# Patient Record
Sex: FEMALE | Race: WHITE | NOT HISPANIC OR LATINO | Employment: FULL TIME | URBAN - METROPOLITAN AREA
[De-identification: names, ages, dates, MRNs, and addresses within clinical notes are randomized per-mention and may not be internally consistent; named-entity substitution may affect disease eponyms.]

---

## 2022-03-13 ENCOUNTER — APPOINTMENT (OUTPATIENT)
Dept: ULTRASOUND IMAGING | Facility: HOSPITAL | Age: 57
End: 2022-03-13

## 2022-03-13 ENCOUNTER — HOSPITAL ENCOUNTER (EMERGENCY)
Facility: HOSPITAL | Age: 57
Discharge: HOME | End: 2022-03-13
Attending: EMERGENCY MEDICINE | Admitting: EMERGENCY MEDICINE

## 2022-03-13 VITALS
HEART RATE: 83 BPM | OXYGEN SATURATION: 100 % | RESPIRATION RATE: 20 BRPM | HEIGHT: 64 IN | DIASTOLIC BLOOD PRESSURE: 93 MMHG | BODY MASS INDEX: 33.61 KG/M2 | TEMPERATURE: 98 F | WEIGHT: 196.87 LBS | SYSTOLIC BLOOD PRESSURE: 149 MMHG

## 2022-03-13 DIAGNOSIS — R10.2 CHRONIC FEMALE PELVIC PAIN: Primary | ICD-10-CM

## 2022-03-13 DIAGNOSIS — G89.29 CHRONIC FEMALE PELVIC PAIN: Primary | ICD-10-CM

## 2022-03-13 DIAGNOSIS — D25.9 UTERINE LEIOMYOMA, UNSPECIFIED LOCATION: ICD-10-CM

## 2022-03-13 LAB
ALBUMIN SERPL-MCNC: 4.3 G/DL (ref 3.5–5.2)
ALBUMIN/GLOB SERPL: 1.3 G/DL
ALP SERPL-CCNC: 88 U/L (ref 39–117)
ALT SERPL W P-5'-P-CCNC: 15 U/L (ref 1–33)
ANION GAP SERPL CALCULATED.3IONS-SCNC: 9.5 MMOL/L (ref 5–15)
AST SERPL-CCNC: 18 U/L (ref 1–32)
BASOPHILS # BLD AUTO: 0.06 10*3/MM3 (ref 0–0.2)
BASOPHILS NFR BLD AUTO: 1.2 % (ref 0–1.5)
BILIRUB SERPL-MCNC: 0.5 MG/DL (ref 0–1.2)
BILIRUB UR QL STRIP: NEGATIVE
BUN SERPL-MCNC: 18 MG/DL (ref 6–20)
BUN/CREAT SERPL: 22 (ref 7–25)
C TRACH RRNA CVX QL NAA+PROBE: NOT DETECTED
CALCIUM SPEC-SCNC: 9.7 MG/DL (ref 8.6–10.5)
CANDIDA SPECIES: NEGATIVE
CHLORIDE SERPL-SCNC: 107 MMOL/L (ref 98–107)
CLARITY UR: ABNORMAL
CO2 SERPL-SCNC: 24.5 MMOL/L (ref 22–29)
COLOR UR: ABNORMAL
CREAT SERPL-MCNC: 0.82 MG/DL (ref 0.57–1)
DEPRECATED RDW RBC AUTO: 42.9 FL (ref 37–54)
EGFRCR SERPLBLD CKD-EPI 2021: 84.6 ML/MIN/1.73
EOSINOPHIL # BLD AUTO: 0.03 10*3/MM3 (ref 0–0.4)
EOSINOPHIL NFR BLD AUTO: 0.6 % (ref 0.3–6.2)
ERYTHROCYTE [DISTWIDTH] IN BLOOD BY AUTOMATED COUNT: 13 % (ref 12.3–15.4)
GARDNERELLA VAGINALIS: POSITIVE
GLOBULIN UR ELPH-MCNC: 3.2 GM/DL
GLUCOSE SERPL-MCNC: 90 MG/DL (ref 65–99)
GLUCOSE UR STRIP-MCNC: NEGATIVE MG/DL
HCT VFR BLD AUTO: 40.5 % (ref 34–46.6)
HGB BLD-MCNC: 13.8 G/DL (ref 12–15.9)
HGB UR QL STRIP.AUTO: NEGATIVE
IMM GRANULOCYTES # BLD AUTO: 0.01 10*3/MM3 (ref 0–0.05)
IMM GRANULOCYTES NFR BLD AUTO: 0.2 % (ref 0–0.5)
KETONES UR QL STRIP: NEGATIVE
LEUKOCYTE ESTERASE UR QL STRIP.AUTO: NEGATIVE
LIPASE SERPL-CCNC: 23 U/L (ref 13–60)
LYMPHOCYTES # BLD AUTO: 2.06 10*3/MM3 (ref 0.7–3.1)
LYMPHOCYTES NFR BLD AUTO: 40.2 % (ref 19.6–45.3)
MCH RBC QN AUTO: 30.8 PG (ref 26.6–33)
MCHC RBC AUTO-ENTMCNC: 34.1 G/DL (ref 31.5–35.7)
MCV RBC AUTO: 90.4 FL (ref 79–97)
MONOCYTES # BLD AUTO: 0.36 10*3/MM3 (ref 0.1–0.9)
MONOCYTES NFR BLD AUTO: 7 % (ref 5–12)
N GONORRHOEA RRNA SPEC QL NAA+PROBE: NOT DETECTED
NEUTROPHILS NFR BLD AUTO: 2.61 10*3/MM3 (ref 1.7–7)
NEUTROPHILS NFR BLD AUTO: 50.8 % (ref 42.7–76)
NITRITE UR QL STRIP: NEGATIVE
NRBC BLD AUTO-RTO: 0 /100 WBC (ref 0–0.2)
PH UR STRIP.AUTO: <=5 [PH] (ref 5–8)
PLATELET # BLD AUTO: 205 10*3/MM3 (ref 140–450)
PMV BLD AUTO: 10.9 FL (ref 6–12)
POTASSIUM SERPL-SCNC: 4.2 MMOL/L (ref 3.5–5.2)
PROT SERPL-MCNC: 7.5 G/DL (ref 6–8.5)
PROT UR QL STRIP: NEGATIVE
RBC # BLD AUTO: 4.48 10*6/MM3 (ref 3.77–5.28)
SODIUM SERPL-SCNC: 141 MMOL/L (ref 136–145)
SP GR UR STRIP: >1.03 (ref 1–1.03)
T VAGINALIS DNA VAG QL PROBE+SIG AMP: NEGATIVE
UROBILINOGEN UR QL STRIP: ABNORMAL
WBC NRBC COR # BLD: 5.13 10*3/MM3 (ref 3.4–10.8)

## 2022-03-13 PROCEDURE — 87660 TRICHOMONAS VAGIN DIR PROBE: CPT | Performed by: PHYSICIAN ASSISTANT

## 2022-03-13 PROCEDURE — 99283 EMERGENCY DEPT VISIT LOW MDM: CPT

## 2022-03-13 PROCEDURE — 80053 COMPREHEN METABOLIC PANEL: CPT | Performed by: PHYSICIAN ASSISTANT

## 2022-03-13 PROCEDURE — 76830 TRANSVAGINAL US NON-OB: CPT

## 2022-03-13 PROCEDURE — 87591 N.GONORRHOEAE DNA AMP PROB: CPT | Performed by: PHYSICIAN ASSISTANT

## 2022-03-13 PROCEDURE — 87510 GARDNER VAG DNA DIR PROBE: CPT | Performed by: PHYSICIAN ASSISTANT

## 2022-03-13 PROCEDURE — 87480 CANDIDA DNA DIR PROBE: CPT | Performed by: PHYSICIAN ASSISTANT

## 2022-03-13 PROCEDURE — 85025 COMPLETE CBC W/AUTO DIFF WBC: CPT | Performed by: PHYSICIAN ASSISTANT

## 2022-03-13 PROCEDURE — 81003 URINALYSIS AUTO W/O SCOPE: CPT | Performed by: PHYSICIAN ASSISTANT

## 2022-03-13 PROCEDURE — 87491 CHLMYD TRACH DNA AMP PROBE: CPT | Performed by: PHYSICIAN ASSISTANT

## 2022-03-13 PROCEDURE — 36415 COLL VENOUS BLD VENIPUNCTURE: CPT

## 2022-03-13 PROCEDURE — 83690 ASSAY OF LIPASE: CPT | Performed by: PHYSICIAN ASSISTANT

## 2022-03-13 RX ORDER — SODIUM CHLORIDE 0.9 % (FLUSH) 0.9 %
10 SYRINGE (ML) INJECTION AS NEEDED
Status: DISCONTINUED | OUTPATIENT
Start: 2022-03-13 | End: 2022-03-13 | Stop reason: HOSPADM

## 2022-03-13 NOTE — ED PROVIDER NOTES
"Subjective   Pt presents with right groin/pelvic pain over the past 2 years. States here over past month or so pain is worsened and hurts to stand for long periods of time or with movement of right leg.  Pt does say she has had 3 c-sections and a \"lump\" of endometriosis removed off of left ovary around 10 years ago.   Pt also complains of bruising more easily to legs/arm whenever accidentally hitting against something. Denies blood thinner but states she is probably taking Ibuprofen more often than she should.  Also concerned about bloating in abdomen even though taking HCTZ 12.5 for BP.  Denies dysuria, fever or chills, abnormal vaginal discharge, n/v/d.      History provided by:  Patient  Abdominal Pain  Pain location:  RLQ  Pain quality: aching    Pain radiates to:  Does not radiate  Pain severity:  Moderate  Onset quality:  Gradual  Duration: 2 years.  Timing:  Intermittent  Progression:  Worsening  Chronicity:  New  Relieved by:  Nothing  Worsened by:  Nothing  Associated symptoms: no chills, no fatigue and no fever        Review of Systems   Constitutional: Negative for appetite change, chills, fatigue and fever.   HENT: Negative.    Eyes: Negative.  Negative for photophobia.   Respiratory: Negative.    Gastrointestinal: Positive for abdominal pain.   Endocrine: Negative.    Genitourinary: Negative.    Musculoskeletal: Negative.    Skin: Negative.    Allergic/Immunologic: Negative.  Negative for immunocompromised state.   Neurological: Negative.    Hematological: Negative.    Psychiatric/Behavioral: Negative.    All other systems reviewed and are negative.      History reviewed. No pertinent past medical history.    No Known Allergies    History reviewed. No pertinent surgical history.    History reviewed. No pertinent family history.    Social History     Socioeconomic History   • Marital status: Single           Objective   Physical Exam  Vitals and nursing note reviewed. Exam conducted with a chaperone " present.   Constitutional:       General: She is not in acute distress.     Appearance: Normal appearance. She is not toxic-appearing.   HENT:      Head: Normocephalic and atraumatic.      Mouth/Throat:      Mouth: Mucous membranes are moist.   Eyes:      General: No scleral icterus.  Cardiovascular:      Rate and Rhythm: Normal rate and regular rhythm.      Pulses: Normal pulses.      Heart sounds: Normal heart sounds.   Pulmonary:      Effort: Pulmonary effort is normal. No respiratory distress.      Breath sounds: Normal breath sounds.   Abdominal:      General: Abdomen is flat.      Palpations: Abdomen is soft.      Tenderness: There is abdominal tenderness (mild right lower pelvic).   Genitourinary:     Vagina: Normal.      Uterus: Normal.       Adnexa:         Right: Tenderness present.       Comments: Small cervical polyp present  Musculoskeletal:         General: Normal range of motion.      Cervical back: Normal range of motion and neck supple.      Comments: Pain worse with movement of right leg and flexion of hip   Skin:     General: Skin is warm and dry.   Neurological:      Mental Status: She is alert and oriented to person, place, and time. Mental status is at baseline.             MDM  Number of Diagnoses or Management Options  Chronic female pelvic pain  Uterine leiomyoma, unspecified location  Diagnosis management comments: Pt is a 56 y.o. female that presents today with Patient presents with:  Groin Pain       Work up today:  Lab Results (last 24 hours)     Procedure Component Value Units Date/Time    Gardnerella vaginalis, Trichomonas vaginalis, Candida albicans, DNA -   Swab, Vagina ([id number])  (Abnormal) Collected: 03/13/22 1021    Specimen: Swab from Vagina Updated: 03/13/22 1136     GARDNERELLA VAGINALIS Positive     TRICHOMONAS VAGINALIS Negative     CANDIDA SPECIES Negative    Chlamydia trachomatis, Neisseria gonorrhoeae, PCR - Swab, Cervix   ([id number])  (Normal) Collected: 03/13/22 1021     Specimen: Swab from Cervix Updated: 03/13/22 1205     Chlamydia DNA by PCR Not Detected     Neisseria gonorrhoeae by PCR Not Detected    CBC & Differential ([id number])  (Normal) Collected: 03/13/22 1112    Specimen: Blood Updated: 03/13/22 1120    Narrative:      The following orders were created for panel order CBC & Differential.  Procedure                               Abnormality         Status                       ---------                               -----------         ------                       CBC Auto Differential([id number])        Normal              Final result                   Please view results for these tests on the individual orders.    Comprehensive Metabolic Panel ([id number]) Collected: 03/13/22 1112    Specimen: Blood Updated: 03/13/22 1150     Glucose 90 mg/dL      BUN 18 mg/dL      Creatinine 0.82 mg/dL      Sodium 141 mmol/L      Potassium 4.2 mmol/L      Chloride 107 mmol/L      CO2 24.5 mmol/L      Calcium 9.7 mg/dL      Total Protein 7.5 g/dL      Albumin 4.30 g/dL      ALT (SGPT) 15 U/L      AST (SGOT) 18 U/L      Alkaline Phosphatase 88 U/L      Total Bilirubin 0.5 mg/dL      Globulin 3.2 gm/dL      A/G Ratio 1.3 g/dL      BUN/Creatinine Ratio 22.0     Anion Gap 9.5 mmol/L      eGFR 84.6 mL/min/1.73      Comment: National Kidney Foundation and American Society of Nephrology   (ASN) Task Force recommended calculation based on the Chronic Kidney   Disease Epidemiology Collaboration (CKD-EPI) equation refit without   adjustment for race.       Narrative:      GFR Normal >60  Chronic Kidney Disease <60  Kidney Failure <15      Lipase ([id number])  (Normal) Collected: 03/13/22 1112    Specimen: Blood Updated: 03/13/22 1150     Lipase 23 U/L     CBC Auto Differential ([id number])  (Normal) Collected: 03/13/22 1112    Specimen: Blood Updated: 03/13/22 1120     WBC 5.13 10*3/mm3      RBC 4.48 10*6/mm3      Hemoglobin 13.8 g/dL      Hematocrit 40.5 %      MCV 90.4 fL      MCH 30.8 pg       MCHC 34.1 g/dL      RDW 13.0 %      RDW-SD 42.9 fl      MPV 10.9 fL      Platelets 205 10*3/mm3      Neutrophil % 50.8 %      Lymphocyte % 40.2 %      Monocyte % 7.0 %      Eosinophil % 0.6 %      Basophil % 1.2 %      Immature Grans % 0.2 %      Neutrophils, Absolute 2.61 10*3/mm3      Lymphocytes, Absolute 2.06 10*3/mm3      Monocytes, Absolute 0.36 10*3/mm3      Eosinophils, Absolute 0.03 10*3/mm3      Basophils, Absolute 0.06 10*3/mm3      Immature Grans, Absolute 0.01 10*3/mm3      nRBC 0.0 /100 WBC     Urinalysis With Culture If Indicated - Urine, Clean Catch ([id number])    (Abnormal) Collected: 03/13/22 1213    Specimen: Urine, Clean Catch Updated: 03/13/22 1228     Color, UA Dark Yellow     Appearance, UA Turbid     pH, UA <=5.0     Specific Gravity, UA >1.030     Glucose, UA Negative     Ketones, UA Negative     Bilirubin, UA Negative     Blood, UA Negative     Protein, UA Negative     Leuk Esterase, UA Negative     Nitrite, UA Negative     Urobilinogen, UA 1.0 E.U./dL    Narrative:      Urine microscopic not indicated.      US Non-ob Transvaginal    Result Date: 3/13/2022  PROCEDURE: US NON-OB TRANSVAGINAL  COMPARISON: None  INDICATIONS: right pelvic pain  TECHNIQUE: Ultrasound examination of the pelvis was performed, using endovaginal technique.   FINDINGS:  The uterus measures 6.4 cm x 3.4 cm x 4.3 cm.  Within the uterine body is a 1.1 cm x 1.1 cm hypoechoic focus, not completely specific in appearance but favored to represent a leiomyoma.  No internal blood flow is seen.  The endometrium measures 0.8 cm in thickness.  No ovarian cyst or mass is seen on either side.  Both ovaries demonstrate blood flow.  No free pelvic fluid is evident.         1. 1.1 cm x 1.1 cm hypoechoic focus in the uterine body, suspected to represent a leiomyoma 2. Endometrial stripe measures 0.8 cm in thickness which is borderline thickened for a postmenopausal patient.  Correlate clinically.     Giorgio Hall M.D.        Electronically Signed and Approved By: Giorgio Hall M.D. on 3/13/2022 at 11:56              @No orders to display     Work up unremarkable for emergent conditions today. Will set up with GYN for further eval.      The patient will pursue further outpatient evaluation with the primary care physician or other designated or consulting physician as outlined in the discharge instructions. They are agreeable to this plan of care and follow-up instructions have been explained in detail. The patient has received these instructions in written format and have expressed an understanding of the discharge instructions. The patient is aware that any significant change in condition or worsening of symptoms should prompt an immediate return to this or the closest emergency department or call to 911.  Pt is otherwise non toxic and non ill appearing and stable for d/c home.  Pt is in agreement with this.  All questions answered at bedside.          Amount and/or Complexity of Data Reviewed  Clinical lab tests: reviewed  Tests in the radiology section of CPT®: reviewed    Risk of Complications, Morbidity, and/or Mortality  Presenting problems: moderate  Diagnostic procedures: moderate  Management options: moderate    Patient Progress  Patient progress: stable      Final diagnoses:   Chronic female pelvic pain   Uterine leiomyoma, unspecified location       ED Disposition  ED Disposition     ED Disposition   Discharge    Condition   Stable    Comment   --             Matthew Queen MD  [facility] [place] [address]  [phone]               Medication List      No changes were made to your prescriptions during this visit.          Hollis Martin PA  03/13/22 2514

## 2022-04-08 ENCOUNTER — OFFICE VISIT (OUTPATIENT)
Dept: INTERNAL MEDICINE | Facility: CLINIC | Age: 57
End: 2022-04-08

## 2022-04-08 VITALS
HEART RATE: 69 BPM | DIASTOLIC BLOOD PRESSURE: 89 MMHG | BODY MASS INDEX: 33.8 KG/M2 | OXYGEN SATURATION: 98 % | TEMPERATURE: 97.7 F | WEIGHT: 198 LBS | HEIGHT: 64 IN | SYSTOLIC BLOOD PRESSURE: 144 MMHG

## 2022-04-08 DIAGNOSIS — D25.9 UTERINE LEIOMYOMA, UNSPECIFIED LOCATION: ICD-10-CM

## 2022-04-08 DIAGNOSIS — F17.211 CIGARETTE NICOTINE DEPENDENCE IN REMISSION: ICD-10-CM

## 2022-04-08 DIAGNOSIS — G25.81 RESTLESS LEG SYNDROME: ICD-10-CM

## 2022-04-08 DIAGNOSIS — Z79.899 ON LONG TERM DRUG THERAPY: ICD-10-CM

## 2022-04-08 DIAGNOSIS — Z00.00 ANNUAL PHYSICAL EXAM: ICD-10-CM

## 2022-04-08 DIAGNOSIS — E66.09 CLASS 1 OBESITY DUE TO EXCESS CALORIES WITHOUT SERIOUS COMORBIDITY WITH BODY MASS INDEX (BMI) OF 33.0 TO 33.9 IN ADULT: Primary | ICD-10-CM

## 2022-04-08 DIAGNOSIS — Z76.89 ESTABLISHING CARE WITH NEW DOCTOR, ENCOUNTER FOR: ICD-10-CM

## 2022-04-08 DIAGNOSIS — M54.41 CHRONIC RIGHT-SIDED LOW BACK PAIN WITH RIGHT-SIDED SCIATICA: ICD-10-CM

## 2022-04-08 DIAGNOSIS — Z12.11 ENCOUNTER FOR SCREENING COLONOSCOPY: ICD-10-CM

## 2022-04-08 DIAGNOSIS — B96.89 BACTERIAL VAGINOSIS: ICD-10-CM

## 2022-04-08 DIAGNOSIS — Z12.39 ENCOUNTER FOR SCREENING FOR MALIGNANT NEOPLASM OF BREAST, UNSPECIFIED SCREENING MODALITY: ICD-10-CM

## 2022-04-08 DIAGNOSIS — I10 ESSENTIAL HYPERTENSION: ICD-10-CM

## 2022-04-08 DIAGNOSIS — N76.0 BACTERIAL VAGINOSIS: ICD-10-CM

## 2022-04-08 DIAGNOSIS — Z11.59 NEED FOR HEPATITIS C SCREENING TEST: ICD-10-CM

## 2022-04-08 DIAGNOSIS — G89.29 CHRONIC RIGHT-SIDED LOW BACK PAIN WITH RIGHT-SIDED SCIATICA: ICD-10-CM

## 2022-04-08 DIAGNOSIS — G47.09 OTHER INSOMNIA: ICD-10-CM

## 2022-04-08 LAB
ALBUMIN SERPL-MCNC: 4.6 G/DL (ref 3.5–5.2)
ALBUMIN/GLOB SERPL: 1.6 G/DL
ALP SERPL-CCNC: 92 U/L (ref 39–117)
ALT SERPL W P-5'-P-CCNC: 17 U/L (ref 1–33)
AMPHET+METHAMPHET UR QL: NEGATIVE
AMPHETAMINE INTERNAL CONTROL: NORMAL
AMPHETAMINES UR QL: NEGATIVE
ANION GAP SERPL CALCULATED.3IONS-SCNC: 10.4 MMOL/L (ref 5–15)
AST SERPL-CCNC: 21 U/L (ref 1–32)
BARBITURATE INTERNAL CONTROL: NORMAL
BARBITURATES UR QL SCN: NEGATIVE
BASOPHILS # BLD AUTO: 0.07 10*3/MM3 (ref 0–0.2)
BASOPHILS NFR BLD AUTO: 1.2 % (ref 0–1.5)
BENZODIAZ UR QL SCN: NEGATIVE
BENZODIAZEPINE INTERNAL CONTROL: NORMAL
BILIRUB SERPL-MCNC: 0.3 MG/DL (ref 0–1.2)
BUN SERPL-MCNC: 13 MG/DL (ref 6–20)
BUN/CREAT SERPL: 16.3 (ref 7–25)
BUPRENORPHINE INTERNAL CONTROL: NORMAL
BUPRENORPHINE SERPL-MCNC: NEGATIVE NG/ML
CALCIUM SPEC-SCNC: 9.3 MG/DL (ref 8.6–10.5)
CANNABINOIDS SERPL QL: NEGATIVE
CHLORIDE SERPL-SCNC: 103 MMOL/L (ref 98–107)
CHOLEST SERPL-MCNC: 211 MG/DL (ref 0–200)
CO2 SERPL-SCNC: 24.6 MMOL/L (ref 22–29)
COCAINE INTERNAL CONTROL: NORMAL
COCAINE UR QL: NEGATIVE
CREAT SERPL-MCNC: 0.8 MG/DL (ref 0.57–1)
DEPRECATED RDW RBC AUTO: 42.5 FL (ref 37–54)
EGFRCR SERPLBLD CKD-EPI 2021: 87.1 ML/MIN/1.73
EOSINOPHIL # BLD AUTO: 0.05 10*3/MM3 (ref 0–0.4)
EOSINOPHIL NFR BLD AUTO: 0.8 % (ref 0.3–6.2)
ERYTHROCYTE [DISTWIDTH] IN BLOOD BY AUTOMATED COUNT: 12.9 % (ref 12.3–15.4)
EXPIRATION DATE: NORMAL
GLOBULIN UR ELPH-MCNC: 2.8 GM/DL
GLUCOSE SERPL-MCNC: 78 MG/DL (ref 65–99)
HCT VFR BLD AUTO: 39.9 % (ref 34–46.6)
HCV AB SER DONR QL: NORMAL
HDLC SERPL-MCNC: 54 MG/DL (ref 40–60)
HGB BLD-MCNC: 13.5 G/DL (ref 12–15.9)
IMM GRANULOCYTES # BLD AUTO: 0.01 10*3/MM3 (ref 0–0.05)
IMM GRANULOCYTES NFR BLD AUTO: 0.2 % (ref 0–0.5)
LDLC SERPL CALC-MCNC: 137 MG/DL (ref 0–100)
LDLC/HDLC SERPL: 2.5 {RATIO}
LYMPHOCYTES # BLD AUTO: 2.53 10*3/MM3 (ref 0.7–3.1)
LYMPHOCYTES NFR BLD AUTO: 42.1 % (ref 19.6–45.3)
Lab: NORMAL
MCH RBC QN AUTO: 30.6 PG (ref 26.6–33)
MCHC RBC AUTO-ENTMCNC: 33.8 G/DL (ref 31.5–35.7)
MCV RBC AUTO: 90.5 FL (ref 79–97)
MDMA (ECSTASY) INTERNAL CONTROL: NORMAL
MDMA UR QL SCN: NEGATIVE
METHADONE INTERNAL CONTROL: NORMAL
METHADONE UR QL SCN: NEGATIVE
METHAMPHETAMINE INTERNAL CONTROL: NORMAL
MONOCYTES # BLD AUTO: 0.38 10*3/MM3 (ref 0.1–0.9)
MONOCYTES NFR BLD AUTO: 6.3 % (ref 5–12)
NEUTROPHILS NFR BLD AUTO: 2.97 10*3/MM3 (ref 1.7–7)
NEUTROPHILS NFR BLD AUTO: 49.4 % (ref 42.7–76)
NRBC BLD AUTO-RTO: 0 /100 WBC (ref 0–0.2)
OPIATES INTERNAL CONTROL: NORMAL
OPIATES UR QL: NEGATIVE
OXYCODONE INTERNAL CONTROL: NORMAL
OXYCODONE UR QL SCN: NEGATIVE
PCP UR QL SCN: NEGATIVE
PHENCYCLIDINE INTERNAL CONTROL: NORMAL
PLATELET # BLD AUTO: 216 10*3/MM3 (ref 140–450)
PMV BLD AUTO: 11.9 FL (ref 6–12)
POTASSIUM SERPL-SCNC: 3.9 MMOL/L (ref 3.5–5.2)
PROT SERPL-MCNC: 7.4 G/DL (ref 6–8.5)
RBC # BLD AUTO: 4.41 10*6/MM3 (ref 3.77–5.28)
SODIUM SERPL-SCNC: 138 MMOL/L (ref 136–145)
THC INTERNAL CONTROL: NORMAL
TRIGL SERPL-MCNC: 111 MG/DL (ref 0–150)
TSH SERPL DL<=0.05 MIU/L-ACNC: 1.31 UIU/ML (ref 0.27–4.2)
VLDLC SERPL-MCNC: 20 MG/DL (ref 5–40)
WBC NRBC COR # BLD: 6.01 10*3/MM3 (ref 3.4–10.8)

## 2022-04-08 PROCEDURE — 87491 CHLMYD TRACH DNA AMP PROBE: CPT | Performed by: STUDENT IN AN ORGANIZED HEALTH CARE EDUCATION/TRAINING PROGRAM

## 2022-04-08 PROCEDURE — 3008F BODY MASS INDEX DOCD: CPT | Performed by: STUDENT IN AN ORGANIZED HEALTH CARE EDUCATION/TRAINING PROGRAM

## 2022-04-08 PROCEDURE — 2014F MENTAL STATUS ASSESS: CPT | Performed by: STUDENT IN AN ORGANIZED HEALTH CARE EDUCATION/TRAINING PROGRAM

## 2022-04-08 PROCEDURE — 84443 ASSAY THYROID STIM HORMONE: CPT | Performed by: STUDENT IN AN ORGANIZED HEALTH CARE EDUCATION/TRAINING PROGRAM

## 2022-04-08 PROCEDURE — 80053 COMPREHEN METABOLIC PANEL: CPT | Performed by: STUDENT IN AN ORGANIZED HEALTH CARE EDUCATION/TRAINING PROGRAM

## 2022-04-08 PROCEDURE — 87661 TRICHOMONAS VAGINALIS AMPLIF: CPT | Performed by: STUDENT IN AN ORGANIZED HEALTH CARE EDUCATION/TRAINING PROGRAM

## 2022-04-08 PROCEDURE — 80305 DRUG TEST PRSMV DIR OPT OBS: CPT | Performed by: STUDENT IN AN ORGANIZED HEALTH CARE EDUCATION/TRAINING PROGRAM

## 2022-04-08 PROCEDURE — 85025 COMPLETE CBC W/AUTO DIFF WBC: CPT | Performed by: STUDENT IN AN ORGANIZED HEALTH CARE EDUCATION/TRAINING PROGRAM

## 2022-04-08 PROCEDURE — 80061 LIPID PANEL: CPT | Performed by: STUDENT IN AN ORGANIZED HEALTH CARE EDUCATION/TRAINING PROGRAM

## 2022-04-08 PROCEDURE — 99386 PREV VISIT NEW AGE 40-64: CPT | Performed by: STUDENT IN AN ORGANIZED HEALTH CARE EDUCATION/TRAINING PROGRAM

## 2022-04-08 PROCEDURE — 86803 HEPATITIS C AB TEST: CPT | Performed by: STUDENT IN AN ORGANIZED HEALTH CARE EDUCATION/TRAINING PROGRAM

## 2022-04-08 PROCEDURE — 87591 N.GONORRHOEAE DNA AMP PROB: CPT | Performed by: STUDENT IN AN ORGANIZED HEALTH CARE EDUCATION/TRAINING PROGRAM

## 2022-04-08 RX ORDER — BENAZEPRIL HYDROCHLORIDE AND HYDROCHLOROTHIAZIDE 20; 12.5 MG/1; MG/1
1 TABLET ORAL DAILY
COMMUNITY
End: 2022-04-08 | Stop reason: SDUPTHER

## 2022-04-08 RX ORDER — ZOLPIDEM TARTRATE 6.25 MG/1
6.25 TABLET, FILM COATED, EXTENDED RELEASE ORAL NIGHTLY PRN
Qty: 30 TABLET | Refills: 2 | Status: SHIPPED | OUTPATIENT
Start: 2022-04-08 | End: 2022-12-28

## 2022-04-08 RX ORDER — TRAZODONE HYDROCHLORIDE 50 MG/1
50 TABLET ORAL NIGHTLY
COMMUNITY
End: 2022-04-08

## 2022-04-08 RX ORDER — BENAZEPRIL HYDROCHLORIDE AND HYDROCHLOROTHIAZIDE 20; 12.5 MG/1; MG/1
1 TABLET ORAL DAILY
Qty: 90 TABLET | Refills: 3 | Status: SHIPPED | OUTPATIENT
Start: 2022-04-08 | End: 2022-12-28

## 2022-04-08 RX ORDER — GABAPENTIN 300 MG/1
300 CAPSULE ORAL 2 TIMES DAILY
COMMUNITY
End: 2022-04-12 | Stop reason: SDUPTHER

## 2022-04-08 RX ORDER — TEMAZEPAM 15 MG/1
15 CAPSULE ORAL NIGHTLY
COMMUNITY
End: 2022-04-08

## 2022-04-08 NOTE — PROGRESS NOTES
Chief Complaint  Establish Care, Insomnia    Subjective          Hope Serena Cuevas presents to [facility]  History of Present Illness    Previous PCP: Patient's previous PCP was Dr. Ajay Macdonald in [place]. [address] [facility] [address] [place] [address]. [phone].  Specialist(s): Patient does not consult any specialist.   COVID vaccine: Patient has not had this vaccination and does not want it.  Pneumonia vaccine: Can't give to patient due to insurance. Patient has not had this vaccination.  Shingles vaccine: Can't give to patient due to insurance. Patient has not had this vaccination.  Colon cancer screening: Patient has never had a colonoscopy.  Mammogram: 2014  Pap Smear: Patient hasn't had a pap in a long time and is overdue. Unsure of date of last pap.   DEXA/Bone Density: Patient has never had this done.    Here to establish care.  Medical history notable for LBP (on gabapentin), insomnia (on trazodone and temazepam), HTN.    Seen in ED 3/13/22 for chronic pelvic pain.  Reports a history of endometriosis.  Had ultrasound showing evidence of a leiomyoma.    Does not currently have an ob/gyn doctor.  She is unsure of her last pap smear.    Regarding insomnia, reports trouble with sleep intiation and sleep maintenance.  She does keep the tv on when she sleeps.    Regarding BP, she reports she does not own a BP cuff    She quit smoking 2 years ago    Regarding BP pain, has pain radiating into right leg.  She has never had PT.  She is on gabapentin for treatment.  She has never had any procedures on her spine.    She has no previous history of MI, CVA or cancer diagnosis.  She has never had covid infection.      Current Outpatient Medications   Medication Instructions   • benazepril-hydrochlorthiazide (LOTENSIN HCT) 20-12.5 MG per tablet 1 tablet, Oral, Daily   • gabapentin (NEURONTIN) 300 mg, Oral, 2 Times Daily, 2 capsules QAM and 2 capsules QPM  "  • zolpidem CR (AMBIEN CR) 6.25 mg, Oral, Nightly PRN       The following portions of the patient's history were reviewed and updated as appropriate: allergies, current medications, past family history, past medical history, past social history, past surgical history, and problem list.    Objective   Vital Signs:   /89 (BP Location: Right arm, Patient Position: Sitting, Cuff Size: Large Adult)   Pulse 69   Temp 97.7 °F (36.5 °C) (Temporal)   Ht 162.6 cm (64\")   Wt 89.8 kg (198 lb)   SpO2 98%   BMI 33.99 kg/m²     Wt Readings from Last 3 Encounters:   04/08/22 89.8 kg (198 lb)   03/13/22 89.3 kg (196 lb 13.9 oz)     BP Readings from Last 3 Encounters:   04/08/22 144/89   03/13/22 149/93     Physical Exam   Appearance: No acute distress, well-nourished  Head: normocephalic, atraumatic  Eyes: no scleral icterus, no conjunctival injection  Ears, Nose, and Throat: external ears normal, nares patent, moist mucous membranes  Cardiovascular: regular rate and rhythm. no murmurs, rubs, or gallops. no edema  Respiratory: breathing comfortably, symmetric chest rise, clear to auscultation bilaterally. No wheezes, rales, or rhonchi.  GI: soft, NTTP, no masses or HSM  Neuro: alert and oriented  Psych: normal mood and affect     Result Review :   The following data was reviewed by: Thierno Li MD on 04/08/2022:  Common labs    Common Labsle 3/13/22 3/13/22    1112 1112   Glucose  90   BUN  18   Creatinine  0.82   Sodium  141   Potassium  4.2   Chloride  107   Calcium  9.7   Albumin  4.30   Total Bilirubin  0.5   Alkaline Phosphatase  88   AST (SGOT)  18   ALT (SGPT)  15   WBC 5.13    Hemoglobin 13.8    Hematocrit 40.5    Platelets 205                   Lab Results   Component Value Date    BILIRUBINUR Negative 03/13/2022       Procedures        Assessment and Plan    Diagnoses and all orders for this visit:    1. Class 1 obesity due to excess calories without serious comorbidity with body mass index (BMI) of 33.0 " to 33.9 in adult (Primary)    2. Establishing care with new doctor, encounter for    3. Uterine leiomyoma, unspecified location    4. Bacterial vaginosis  -     Chlamydia trachomatis, Neisseria gonorrhoeae, Trichomonas vaginalis, PCR - Urine, Urine, Clean Catch    5. Need for hepatitis C screening test  -     Hepatitis C Antibody    6. Annual physical exam  -     CBC & Differential  -     Comprehensive Metabolic Panel  -     Hepatitis C Antibody  -     Lipid Panel  -     TSH    7. Encounter for screening colonoscopy  -     Ambulatory Referral For Screening Colonoscopy    8. Encounter for screening for malignant neoplasm of breast, unspecified screening modality  -     Mammo Screening Bilateral With CAD; Future    9. Essential hypertension  -     Comprehensive Metabolic Panel  -     benazepril-hydrochlorthiazide (LOTENSIN HCT) 20-12.5 MG per tablet; Take 1 tablet by mouth Daily.  Dispense: 90 tablet; Refill: 3    10. Chronic right-sided low back pain with right-sided sciatica  -     Ambulatory Referral to Physical Therapy Evaluate and treat    11. Restless leg syndrome    12. Cigarette nicotine dependence in remission    13. On long term drug therapy  -     POC Urine Drug Screen [facility]    14. Other insomnia  -     zolpidem CR (Ambien CR) 6.25 MG CR tablet; Take 1 tablet by mouth At Night As Needed for Sleep.  Dispense: 30 tablet; Refill: 2      HTN:  -will monitor for now, and if still above goal next visit will adjust her regimen  -low sodium dietary counseling today    Insomnia:  -will stop trazodone and temazepam, and substitute ambien  -sleep hygiene counseling today    LBP:  -PT referral  -will continue gabapentin    Obesity:  -exercise counseling, recommending at least 2.5 hours moderate exercise weekly  -nutritional counseling, on the elements of a heart healthy diet as well as low sodium dietary counseling  -referred for mammogram and colonoscopy    Health Maintenance:  -exercise counseling,  recommending at least 2.5 hours moderate exercise weekly  -nutritional counseling, on the elements of a heart healthy diet as well as low sodium dietary counseling  -mammogram ordered, and colonoscopy referral placed    Misc:  -asked  to request previous PCP's records    Medications Discontinued During This Encounter   Medication Reason   • benazepril-hydrochlorthiazide (LOTENSIN HCT) 20-12.5 MG per tablet Reorder   • traZODone (DESYREL) 50 MG tablet    • temazepam (RESTORIL) 15 MG capsule           Follow Up   Return in about 3 months (around 7/8/2022) for Back Pain, Insomnia.  Patient was given instructions and counseling regarding her condition or for health maintenance advice. Please see specific information pulled into the AVS if appropriate.       Thierno Li MD  04/08/22  18:35 EDT

## 2022-04-08 NOTE — PATIENT INSTRUCTIONS
Cooking With Less Salt  Cooking with less salt is one way to reduce the amount of sodium you get from food. Sodium is one of the elements that make up salt. It is found naturally in foods and is also added to certain foods. Depending on your condition and overall health, your health care provider or dietitian may recommend that you reduce your sodium intake. Most people should have less than 2,300 milligrams (mg) of sodium each day. If you have high blood pressure (hypertension), you may need to limit your sodium to 1,500 mg each day. Follow the tips below to help reduce your sodium intake.  What are tips for eating less sodium?  Reading food labels       Check the food label before buying or using packaged ingredients. Always check the label for the serving size and sodium content.  Look for products with no more than 140 mg of sodium in one serving.  Check the % Daily Value column to see what percent of the daily recommended amount of sodium is provided in one serving of the product. Foods with 5% or less in this column are considered low in sodium. Foods with 20% or higher are considered high in sodium.  Do not choose foods with salt as one of the first three ingredients on the ingredients list. If salt is one of the first three ingredients, it usually means the item is high in sodium.     Shopping  Buy sodium-free or low-sodium products. Look for the following words on food labels:  Low-sodium.  Sodium-free.  Reduced-sodium.  No salt added.  Unsalted.  Always check the sodium content even if foods are labeled as low-sodium or no salt added.  Buy fresh foods.  Cooking  Use herbs, seasonings without salt, and spices as substitutes for salt.  Use sodium-free baking soda when baking.  [place], braise, or roast foods to add flavor with less salt.  Avoid adding salt to pasta, rice, or hot cereals.  Drain and rinse canned vegetables, beans, and meat before use.  Avoid adding salt when cooking sweets and desserts.  Cook  "with low-sodium ingredients.  What foods are high in sodium?  Vegetables  Regular canned vegetables (not low-sodium or reduced-sodium). Sauerkraut, pickled vegetables, and relishes. Olives. French fries. Onion rings. Regular canned tomato sauce and paste. Regular tomato and vegetable juice. Frozen vegetables in sauces.  Grains  Instant hot cereals. Bread stuffing, pancake, and biscuit mixes. Croutons. Seasoned rice or pasta mixes. Noodle soup cups. Boxed or frozen macaroni and cheese. Regular salted crackers. Self-rising flour. Rolls. Bagels. Flour tortillas and wraps.  Meats and other proteins  Meat or fish that is salted, canned, smoked, cured, spiced, or pickled. This includes allen, ham, sausages, hot dogs, corned beef, chipped beef, meat loaves, salt pork, jerky, pickled herring, anchovies, regular canned tuna, and sardines. Salted nuts.  Dairy  Processed cheese and cheese spreads. Cheese curds. Blue cheese. Feta cheese. String cheese. Regular cottage cheese. Buttermilk. Canned milk.  The items listed above may not be a complete list of foods high in sodium. Actual amounts of sodium may be different depending on processing. Contact a dietitian for more information.  What foods are low in sodium?  Fruits  Fresh, frozen, or canned fruit with no sauce added. Fruit juice.  Vegetables  Fresh or frozen vegetables with no sauce added. \"No salt added\" canned vegetables. \"No salt added\" tomato sauce and paste. Low-sodium or reduced-sodium tomato and vegetable juice.  Grains  Noodles, pasta, quinoa, rice. Shredded or puffed wheat or puffed rice. Regular or quick oats (not instant). Low-sodium crackers. Low-sodium bread. Whole-grain bread and whole-grain pasta. Unsalted popcorn.  Meats and other proteins  Fresh or frozen whole meats, poultry (not injected with sodium), and fish with no sauce added. Unsalted nuts. Dried peas, beans, and lentils without added salt. Unsalted canned beans. Eggs. Unsalted nut butters. " Low-sodium canned tuna or chicken.  Dairy  Milk. Soy milk. Yogurt. Low-sodium cheeses, such as Swiss, [place], mozzarella, and ricotta. Sherbet or ice cream (keep to ½ cup per serving). Cream cheese.  Fats and oils  Unsalted butter or margarine.  Other foods  Homemade pudding. Sodium-free baking soda and baking powder. Herbs and spices. Low-sodium seasoning mixes.  Beverages  Coffee and tea. Carbonated beverages.  The items listed above may not be a complete list of foods low in sodium. Actual amounts of sodium may be different depending on processing. Contact a dietitian for more information.  What are some salt alternatives when cooking?  The following are herbs, seasonings, and spices that can be used instead of salt to flavor your food. Herbs should be fresh or dried. Do not choose packaged mixes. Next to the name of the herb, spice, or seasoning are some examples of foods you can pair it with.  Herbs  Bay leaves - Soups, meat and vegetable dishes, and spaghetti sauce.  Basil - Italian dishes, soups, pasta, and fish dishes.  Cilantro - Meat, poultry, and vegetable dishes.  Chili powder - Marinades and Mexican dishes.  Chives - Salad dressings and potato dishes.  Cumin - Mexican dishes, couscous, and meat dishes.  Dill - Fish dishes, sauces, and salads.  Fennel - Meat and vegetable dishes, breads, and cookies.  Garlic (do not use garlic salt) - Italian dishes, meat dishes, salad dressings, and sauces.  Marjoram - Soups, potato dishes, and meat dishes.  Oregano - Pizza and spaghetti sauce.  Parsley - Salads, soups, pasta, and meat dishes.  Danica - Italian dishes, salad dressings, soups, and red meats.  Saffron - Fish dishes, pasta, and some poultry dishes.  Hector - Stuffings and sauces.  Tarragon - Fish and poultry dishes.  Thyme - Stuffing, meat, and fish dishes.  Seasonings  Lemon juice - Fish dishes, poultry dishes, vegetables, and salads.  Vinegar - Salad dressings, vegetables, and fish  "dishes.  Spices  Cinnamon - Sweet dishes, such as cakes, cookies, and puddings.  Cloves - Gingerbread, puddings, and marinades for meats.  Burton - Vegetable dishes, fish and poultry dishes, and stir-ibarra dishes.  Jaquelin - Vegetable dishes, fish dishes, and stir-ibarra dishes.  Nutmeg - Pasta, vegetables, poultry, fish dishes, and custard.  Summary  Cooking with less salt is one way to reduce the amount of sodium that you get from food.  Buy sodium-free or low-sodium products.  Check the food label before using or buying packaged ingredients.  Use herbs, seasonings without salt, and spices as substitutes for salt in foods.  This information is not intended to replace advice given to you by your health care provider. Make sure you discuss any questions you have with your health care provider.  Document Revised: 12/09/2020 Document Reviewed: 12/09/2020  [employer] Patient Education © 2021 [employer]      https://www.nhlbi.nih.gov/files/docs/public/heart/dash_brief.pdf\">   DASH Eating Plan  DASH stands for Dietary Approaches to Stop Hypertension. The DASH eating plan is a healthy eating plan that has been shown to:  Reduce high blood pressure (hypertension).  Reduce your risk for type 2 diabetes, heart disease, and stroke.  Help with weight loss.  What are tips for following this plan?  Reading food labels  Check food labels for the amount of salt (sodium) per serving. Choose foods with less than 5 percent of the Daily Value of sodium. Generally, foods with less than 300 milligrams (mg) of sodium per serving fit into this eating plan.  To find whole grains, look for the word \"whole\" as the first word in the ingredient list.  Shopping  Buy products labeled as \"low-sodium\" or \"no salt added.\"  Buy fresh foods. Avoid canned foods and pre-made or frozen meals.  Cooking  Avoid adding salt when cooking. Use salt-free seasonings or herbs instead of table salt or sea salt. Check with your health care provider or pharmacist before " using salt substitutes.  Do not ibarra foods. Cook foods using healthy methods such as baking, boiling, grilling, roasting, and broiling instead.  Cook with heart-healthy oils, such as olive, canola, avocado, soybean, or sunflower oil.  Meal planning       Eat a balanced diet that includes:  4 or more servings of fruits and 4 or more servings of vegetables each day. Try to fill one-half of your plate with fruits and vegetables.  6-8 servings of whole grains each day.  Less than 6 oz (170 g) of lean meat, poultry, or fish each day. A 3-oz (85-g) serving of meat is about the same size as a deck of cards. One egg equals 1 oz (28 g).  2-3 servings of low-fat dairy each day. One serving is 1 cup (237 mL).  1 serving of nuts, seeds, or beans 5 times each week.  2-3 servings of heart-healthy fats. Healthy fats called omega-3 fatty acids are found in foods such as walnuts, flaxseeds, fortified milks, and eggs. These fats are also found in cold-water fish, such as sardines, salmon, and mackerel.  Limit how much you eat of:  Canned or prepackaged foods.  Food that is high in trans fat, such as some fried foods.  Food that is high in saturated fat, such as fatty meat.  Desserts and other sweets, sugary drinks, and other foods with added sugar.  Full-fat dairy products.  Do not salt foods before eating.  Do not eat more than 4 egg yolks a week.  Try to eat at least 2 vegetarian meals a week.  Eat more home-cooked food and less restaurant, buffet, and fast food.     Lifestyle  When eating at a restaurant, ask that your food be prepared with less salt or no salt, if possible.  If you drink alcohol:  Limit how much you use to:  0-1 drink a day for women who are not pregnant.  0-2 drinks a day for men.  Be aware of how much alcohol is in your drink. In the [place] one drink equals one 12 oz bottle of beer (355 mL), one 5 oz glass of wine (148 mL), or one 1½ oz glass of hard liquor (44 mL).  General information  Avoid eating more than  2,300 mg of salt a day. If you have hypertension, you may need to reduce your sodium intake to 1,500 mg a day.  Work with your health care provider to maintain a healthy body weight or to lose weight. Ask what an ideal weight is for you.  Get at least 30 minutes of exercise that causes your heart to beat faster (aerobic exercise) most days of the week. Activities may include walking, swimming, or biking.  Work with your health care provider or dietitian to adjust your eating plan to your individual calorie needs.  What foods should I eat?  Fruits  All fresh, dried, or frozen fruit. Canned fruit in natural juice (without added sugar).  Vegetables  Fresh or frozen vegetables (raw, steamed, roasted, or grilled). Low-sodium or reduced-sodium tomato and vegetable juice. Low-sodium or reduced-sodium tomato sauce and tomato paste. Low-sodium or reduced-sodium canned vegetables.  Grains  Whole-grain or whole-wheat bread. Whole-grain or whole-wheat pasta. Brown rice. Oatmeal. Quinoa. Bulgur. Whole-grain and low-sodium cereals. Grace bread. Low-fat, low-sodium crackers. Whole-wheat flour tortillas.  Meats and other proteins  Skinless chicken or turkey. Ground chicken or turkey. Pork with fat trimmed off. Fish and seafood. Egg whites. Dried beans, peas, or lentils. Unsalted nuts, nut butters, and seeds. Unsalted canned beans. Lean cuts of beef with fat trimmed off. Low-sodium, lean precooked or cured meat, such as sausages or meat loaves.  Dairy  Low-fat (1%) or fat-free (skim) milk. Reduced-fat, low-fat, or fat-free cheeses. Nonfat, low-sodium ricotta or cottage cheese. Low-fat or nonfat yogurt. Low-fat, low-sodium cheese.  Fats and oils  Soft margarine without trans fats. Vegetable oil. Reduced-fat, low-fat, or light mayonnaise and salad dressings (reduced-sodium). Canola, safflower, olive, avocado, soybean, and sunflower oils. Avocado.  Seasonings and condiments  Herbs. Spices. Seasoning mixes without salt.  Other  foods  Unsalted popcorn and pretzels. Fat-free sweets.  The items listed above may not be a complete list of foods and beverages you can eat. Contact a dietitian for more information.  What foods should I avoid?  Fruits  Canned fruit in a light or heavy syrup. Fried fruit. Fruit in cream or butter sauce.  Vegetables  Creamed or fried vegetables. Vegetables in a cheese sauce. Regular canned vegetables (not low-sodium or reduced-sodium). Regular canned tomato sauce and paste (not low-sodium or reduced-sodium). Regular tomato and vegetable juice (not low-sodium or reduced-sodium). Pickles. Olives.  Grains  Baked goods made with fat, such as croissants, muffins, or some breads. Dry pasta or rice meal packs.  Meats and other proteins  Fatty cuts of meat. Ribs. Fried meat. Rivas. Bologna, salami, and other precooked or cured meats, such as sausages or meat loaves. Fat from the back of a pig (fatback). Bratwurst. Salted nuts and seeds. Canned beans with added salt. Canned or smoked fish. Whole eggs or egg yolks. Chicken or turkey with skin.  Dairy  Whole or 2% milk, cream, and half-and-half. Whole or full-fat cream cheese. Whole-fat or sweetened yogurt. Full-fat cheese. Nondairy creamers. Whipped toppings. Processed cheese and cheese spreads.  Fats and oils  Butter. Stick margarine. Lard. Shortening. Ghee. Rivas fat. Tropical oils, such as coconut, palm kernel, or palm oil.  Seasonings and condiments  Onion salt, garlic salt, seasoned salt, table salt, and sea salt. Worcestershire sauce. Tartar sauce. Barbecue sauce. Teriyaki sauce. Soy sauce, including reduced-sodium. Steak sauce. Canned and packaged gravies. Fish sauce. Oyster sauce. Cocktail sauce. Store-bought horseradish. Ketchup. Mustard. Meat flavorings and tenderizers. Bouillon cubes. Hot sauces. Pre-made or packaged marinades. Pre-made or packaged taco seasonings. Relishes. Regular salad dressings.  Other foods  Salted popcorn and pretzels.  The items listed above  may not be a complete list of foods and beverages you should avoid. Contact a dietitian for more information.  Where to find more information  National Heart, Lung, and Blood [place]: www.nhlbi.nih.gov  American Heart Association: www.heart.org  Academy of Nutrition and Dietetics: www.eatright.org  National Kidney Foundation: www.kidney.org  Summary  The DASH eating plan is a healthy eating plan that has been shown to reduce high blood pressure (hypertension). It may also reduce your risk for type 2 diabetes, heart disease, and stroke.  When on the DASH eating plan, aim to eat more fresh fruits and vegetables, whole grains, lean proteins, low-fat dairy, and heart-healthy fats.  With the DASH eating plan, you should limit salt (sodium) intake to 2,300 mg a day. If you have hypertension, you may need to reduce your sodium intake to 1,500 mg a day.  Work with your health care provider or dietitian to adjust your eating plan to your individual calorie needs.  This information is not intended to replace advice given to you by your health care provider. Make sure you discuss any questions you have with your health care provider.  Document Revised: 11/20/2020 Document Reviewed: 11/20/2020  [employer] Patient Education © 2021 [employer].       Heart-Healthy Eating Plan  Heart-healthy meal planning includes:  Eating less unhealthy fats.  Eating more healthy fats.  Making other changes in your diet.  Talk with your doctor or a diet specialist (dietitian) to create an eating plan that is right for you.  What is my plan?  Your doctor may recommend an eating plan that includes:  Total fat: ______% or less of total calories a day.  Saturated fat: ______% or less of total calories a day.  Cholesterol: less than _________mg a day.  What are tips for following this plan?  Cooking  Avoid frying your food. Try to bake, boil, grill, or broil it instead. You can also reduce fat by:  Removing the skin from poultry.  Removing all  visible fats from meats.  Steaming vegetables in water or broth.  Meal planning       At meals, divide your plate into four equal parts:  Fill one-half of your plate with vegetables and green salads.  Fill one-fourth of your plate with whole grains.  Fill one-fourth of your plate with lean protein foods.  Eat 4-5 servings of vegetables per day. A serving of vegetables is:  1 cup of raw or cooked vegetables.  2 cups of raw leafy greens.  Eat 4-5 servings of fruit per day. A serving of fruit is:  1 medium whole fruit.  ¼ cup of dried fruit.  ½ cup of fresh, frozen, or canned fruit.  ½ cup of 100% fruit juice.  Eat more foods that have soluble fiber. These are apples, broccoli, carrots, beans, peas, and barley. Try to get 20-30 g of fiber per day.  Eat 4-5 servings of nuts, legumes, and seeds per week:  1 serving of dried beans or legumes equals ½ cup after being cooked.  1 serving of nuts is ¼ cup.  1 serving of seeds equals 1 tablespoon.     General information  Eat more home-cooked food. Eat less restaurant, buffet, and fast food.  Limit or avoid alcohol.  Limit foods that are high in starch and sugar.  Avoid fried foods.  Lose weight if you are overweight.  Keep track of how much salt (sodium) you eat. This is important if you have high blood pressure. Ask your doctor to tell you more about this.  Try to add vegetarian meals each week.  Fats  Choose healthy fats. These include olive oil and canola oil, flaxseeds, walnuts, almonds, and seeds.  Eat more omega-3 fats. These include salmon, mackerel, sardines, tuna, flaxseed oil, and ground flaxseeds. Try to eat fish at least 2 times each week.  Check food labels. Avoid foods with trans fats or high amounts of saturated fat.  Limit saturated fats.  These are often found in animal products, such as meats, butter, and cream.  These are also found in plant foods, such as palm oil, palm kernel oil, and coconut oil.  Avoid foods with partially hydrogenated oils in them.  These have trans fats. Examples are stick margarine, some tub margarines, cookies, crackers, and other baked goods.  What foods can I eat?  Fruits  All fresh, canned (in natural juice), or frozen fruits.  Vegetables  Fresh or frozen vegetables (raw, steamed, roasted, or grilled). Green salads.  Grains  Most grains. Choose whole wheat and whole grains most of the time. Rice and pasta, including brown rice and pastas made with whole wheat.  Meats and other proteins  Lean, well-trimmed beef, veal, pork, and lamb. Chicken and turkey without skin. All fish and shellfish. Wild duck, rabbit, pheasant, and venison. Egg whites or low-cholesterol egg substitutes. Dried beans, peas, lentils, and tofu. Seeds and most nuts.  Dairy  Low-fat or nonfat cheeses, including ricotta and mozzarella. Skim or 1% milk that is liquid, powdered, or evaporated. Buttermilk that is made with low-fat milk. Nonfat or low-fat yogurt.  Fats and oils  Non-hydrogenated (trans-free) margarines. Vegetable oils, including soybean, sesame, sunflower, olive, peanut, safflower, corn, canola, and cottonseed. Salad dressings or mayonnaise made with a vegetable oil.  Beverages  Mineral water. Coffee and tea. Diet carbonated beverages.  Sweets and desserts  Sherbet, gelatin, and fruit ice. Small amounts of dark chocolate.  Limit all sweets and desserts.  Seasonings and condiments  All seasonings and condiments.  The items listed above may not be a complete list of foods and drinks you can eat. Contact a dietitian for more options.  What foods should I avoid?  Fruits  Canned fruit in heavy syrup. Fruit in cream or butter sauce. Fried fruit. Limit coconut.  Vegetables  Vegetables cooked in cheese, cream, or butter sauce. Fried vegetables.  Grains  Breads that are made with saturated or trans fats, oils, or whole milk. Croissants. Sweet rolls. Donuts. High-fat crackers, such as cheese crackers.  Meats and other proteins  Fatty meats, such as hot dogs, ribs,  sausage, allen, rib-eye roast or steak. High-fat deli meats, such as salami and bologna. Caviar. Domestic duck and goose. Organ meats, such as liver.  Dairy  Cream, sour cream, cream cheese, and creamed cottage cheese. Whole-milk cheeses. Whole or 2% milk that is liquid, evaporated, or condensed. Whole buttermilk. Cream sauce or high-fat cheese sauce. Yogurt that is made from whole milk.  Fats and oils  Meat fat, or shortening. Cocoa butter, hydrogenated oils, palm oil, coconut oil, palm kernel oil. Solid fats and shortenings, including allen fat, salt pork, lard, and butter. Nondairy cream substitutes. Salad dressings with cheese or sour cream.  Beverages  Regular sodas and juice drinks with added sugar.  Sweets and desserts  Frosting. Pudding. Cookies. Cakes. Pies. Milk chocolate or white chocolate. Buttered syrups. Full-fat ice cream or ice cream drinks.  The items listed above may not be a complete list of foods and drinks to avoid. Contact a dietitian for more information.  Summary  Heart-healthy meal planning includes eating less unhealthy fats, eating more healthy fats, and making other changes in your diet.  Eat a balanced diet. This includes fruits and vegetables, low-fat or nonfat dairy, lean protein, nuts and legumes, whole grains, and heart-healthy oils and fats.  This information is not intended to replace advice given to you by your health care provider. Make sure you discuss any questions you have with your health care provider.  Document Revised: 02/21/2019 Document Reviewed: 01/25/2019  [employer]vier Patient Education © 2021 [employer].       Mediterranean Diet  A Mediterranean diet refers to food and lifestyle choices that are based on the traditions of countries located on the [place]. This way of eating has been shown to help prevent certain conditions and improve outcomes for people who have chronic diseases, like kidney disease and heart disease.  What are tips for following this  plan?  Lifestyle  Cook and eat meals together with your family, when possible.  Drink enough fluid to keep your urine clear or pale yellow.  Be physically active every day. This includes:  Aerobic exercise like running or swimming.  Leisure activities like gardening, walking, or housework.  Get 7-8 hours of sleep each night.  If recommended by your health care provider, drink red wine in moderation. This means 1 glass a day for nonpregnant women and 2 glasses a day for men. A glass of wine equals 5 oz (150 mL).  Reading food labels       Check the serving size of packaged foods. For foods such as rice and pasta, the serving size refers to the amount of cooked product, not dry.  Check the total fat in packaged foods. Avoid foods that have saturated fat or trans fats.  Check the ingredients list for added sugars, such as corn syrup.     Shopping  At the grocery store, buy most of your food from the areas near the walls of the store. This includes:  Fresh fruits and vegetables (produce).  Grains, beans, nuts, and seeds. Some of these may be available in unpackaged forms or large amounts (in bulk).  Fresh seafood.  Poultry and eggs.  Low-fat dairy products.  Buy whole ingredients instead of prepackaged foods.  Buy fresh fruits and vegetables in-season from local farmers markets.  Buy frozen fruits and vegetables in resealable bags.  If you do not have access to quality fresh seafood, buy precooked frozen shrimp or canned fish, such as tuna, salmon, or sardines.  Buy small amounts of raw or cooked vegetables, salads, or olives from the deli or salad bar at your store.  Stock your pantry so you always have certain foods on hand, such as olive oil, canned tuna, canned tomatoes, rice, pasta, and beans.  Cooking  Cook foods with extra-virgin olive oil instead of using butter or other vegetable oils.  Have meat as a side dish, and have vegetables or grains as your main dish. This means having meat in small portions or adding  small amounts of meat to foods like pasta or stew.  Use beans or vegetables instead of meat in common dishes like chili or lasagna.  [place] with different cooking methods. Try roasting or broiling vegetables instead of steaming or sautéeing them.  Add frozen vegetables to soups, stews, pasta, or rice.  Add nuts or seeds for added healthy fat at each meal. You can add these to yogurt, salads, or vegetable dishes.  Marinate fish or vegetables using olive oil, lemon juice, garlic, and fresh herbs.  Meal planning       Plan to eat 1 vegetarian meal one day each week. Try to work up to 2 vegetarian meals, if possible.  Eat seafood 2 or more times a week.  Have healthy snacks readily available, such as:  Vegetable sticks with hummus.  Greek yogurt.  Fruit and nut trail mix.  Eat balanced meals throughout the week. This includes:  Fruit: 2-3 servings a day  Vegetables: 4-5 servings a day  Low-fat dairy: 2 servings a day  Fish, poultry, or lean meat: 1 serving a day  Beans and legumes: 2 or more servings a week  Nuts and seeds: 1-2 servings a day  Whole grains: 6-8 servings a day  Extra-virgin olive oil: 3-4 servings a day  Limit red meat and sweets to only a few servings a month     What are my food choices?  Mediterranean diet  Recommended  Grains: Whole-grain pasta. Brown rice. Bulgar wheat. Polenta. Couscous. Whole-wheat bread. Oatmeal. Quinoa.  Vegetables: Artichokes. Beets. Broccoli. Cabbage. Carrots. Eggplant. Green beans. Chard. Kale. Spinach. Onions. Leeks. Peas. Squash. Tomatoes. Peppers. Radishes.  Fruits: Apples. Apricots. Avocado. Berries. Bananas. Cherries. Dates. Figs. Grapes. Jared. Melon. Oranges. Peaches. Plums. Pomegranate.  Meats and other protein foods: Beans. Almonds. Sunflower seeds. Pine nuts. Peanuts. Cod. Jordan. Scallops. Shrimp. Tuna. Tilapia. Clams. Oysters. Eggs.  Dairy: Low-fat milk. Cheese. Greek yogurt.  Beverages: Water. Red wine. Herbal tea.  Fats and oils: Extra virgin olive oil.  Avocado oil. Grape seed oil.  Sweets and desserts: Greek yogurt with honey. Baked apples. Poached pears. Trail mix.  Seasoning and other foods: Basil. Cilantro. Coriander. Cumin. Mint. Parsley. Hector. Rosemary. Tarragon. Garlic. Oregano. Thyme. Pepper. Balsalmic vinegar. Tahini. Hummus. Tomato sauce. Olives. Mushrooms.  Limit these  Grains: Prepackaged pasta or rice dishes. Prepackaged cereal with added sugar.  Vegetables: Deep fried potatoes (french fries).  Fruits: Fruit canned in syrup.  Meats and other protein foods: Beef. Pork. Lamb. Poultry with skin. Hot dogs. Rivas.  Dairy: Ice cream. Sour cream. Whole milk.  Beverages: Juice. Sugar-sweetened soft drinks. Beer. Liquor and spirits.  Fats and oils: Butter. Canola oil. Vegetable oil. Beef fat (tallow). Lard.  Sweets and desserts: Cookies. Cakes. Pies. Candy.  Seasoning and other foods: Mayonnaise. Premade sauces and marinades.  The items listed may not be a complete list. Talk with your dietitian about what dietary choices are right for you.  Summary  The Mediterranean diet includes both food and lifestyle choices.  Eat a variety of fresh fruits and vegetables, beans, nuts, seeds, and whole grains.  Limit the amount of red meat and sweets that you eat.  Talk with your health care provider about whether it is safe for you to drink red wine in moderation. This means 1 glass a day for nonpregnant women and 2 glasses a day for men. A glass of wine equals 5 oz (150 mL).  This information is not intended to replace advice given to you by your health care provider. Make sure you discuss any questions you have with your health care provider.  Document Revised: 08/17/2017 Document Reviewed: 08/10/2017  Elsevier Patient Education © 2020 [employer] Inc[employer]         Exercising to Stay Healthy  To become healthy and stay healthy, it is recommended that you do moderate-intensity and vigorous-intensity exercise. You can tell that you are exercising at a moderate intensity if your  heart starts beating faster and you start breathing faster but can still hold a conversation. You can tell that you are exercising at a vigorous intensity if you are breathing much harder and faster and cannot hold a conversation while exercising.  Exercising regularly is important. It has many health benefits, such as:  Improving overall fitness, flexibility, and endurance.  Increasing bone density.  Helping with weight control.  Decreasing body fat.  Increasing muscle strength.  Reducing stress and tension.  Improving overall health.  How often should I exercise?  Choose an activity that you enjoy, and set realistic goals. Your health care provider can help you make an activity plan that works for you.  Exercise regularly as told by your health care provider. This may include:  Doing strength training two times a week, such as:  Lifting weights.  Using resistance bands.  Push-ups.  Sit-ups.  Yoga.  Doing a certain intensity of exercise for a given amount of time. Choose from these options:  A total of 150 minutes of moderate-intensity exercise every week.  A total of 75 minutes of vigorous-intensity exercise every week.  A mix of moderate-intensity and vigorous-intensity exercise every week.  Children, pregnant women, people who have not exercised regularly, people who are overweight, and older adults may need to talk with a health care provider about what activities are safe to do. If you have a medical condition, be sure to talk with your health care provider before you start a new exercise program.  What are some exercise ideas?    Moderate-intensity exercise ideas include:  Walking 1 mile (1.6 km) in about 15 minutes.  Biking.  Hiking.  Golfing.  Dancing.  Water aerobics.  Vigorous-intensity exercise ideas include:  Walking 4.5 miles (7.2 km) or more in about 1 hour.  Jogging or running 5 miles (8 km) in about 1 hour.  Biking 10 miles (16.1 km) or more in about 1 hour.  Lap swimming.  Roller-skating or in-line  skating.  Cross-country skiing.  Vigorous competitive sports, such as football, basketball, and soccer.  Jumping rope.  Aerobic dancing.  What are some everyday activities that can help me to get exercise?  Yard work, such as:  Pushing a .  Raking and bagging leaves.  Washing your car.  Pushing a stroller.  Shoveling snow.  Gardening.  Washing windows or floors.  How can I be more active in my day-to-day activities?  Use stairs instead of an elevator.  Take a walk during your lunch break.  If you drive, park your car farther away from your work or school.  If you take public transportation, get off one stop early and walk the rest of the way.  Stand up or walk around during all of your indoor phone calls.  Get up, stretch, and walk around every 30 minutes throughout the day.  Enjoy exercise with a friend. Support to continue exercising will help you keep a regular routine of activity.  What guidelines can I follow while exercising?  Before you start a new exercise program, talk with your health care provider.  Do not exercise so much that you hurt yourself, feel dizzy, or get very short of breath.  Wear comfortable clothes and wear shoes with good support.  Drink plenty of water while you exercise to prevent dehydration or heat stroke.  Work out until your breathing and your heartbeat get faster.  Where to find more information  [place] Department of Health and Human Services: www.hhs.gov  Centers for Disease Control and Prevention (CDC): www.cdc.gov  Summary  Exercising regularly is important. It will improve your overall fitness, flexibility, and endurance.  Regular exercise also will improve your overall health. It can help you control your weight, reduce stress, and improve your bone density.  Do not exercise so much that you hurt yourself, feel dizzy, or get very short of breath.  Before you start a new exercise program, talk with your health care provider.  This information is not intended to replace  advice given to you by your health care provider. Make sure you discuss any questions you have with your health care provider.  Document Revised: 11/30/2018 Document Reviewed: 11/08/2018  Elsevier Patient Education © 2021 [employer]           Mindfulness-Based Stress Reduction  Mindfulness-based stress reduction (MBSR) is a program that helps people learn to practice mindfulness. Mindfulness is the practice of intentionally paying attention to the present moment. It can be learned and practiced through techniques such as education, breathing exercises, meditation, and yoga. MBSR includes several mindfulness techniques in one program.  MBSR works best when you understand the treatment, are willing to try new things, and can commit to spending time practicing what you learn. MBSR training may include learning about:  How your emotions, thoughts, and reactions affect your body.  New ways to respond to things that cause negative thoughts to start (triggers).  How to notice your thoughts and let go of them.  Practicing awareness of everyday things that you normally do without thinking.  The techniques and goals of different types of meditation.  What are the benefits of MBSR?  MBSR can have many benefits, which include helping you to:  Develop self-awareness. This refers to knowing and understanding yourself.  Learn skills and attitudes that help you to participate in your own health care.  Learn new ways to care for yourself.  Be more accepting about how things are, and let things go.  Be less judgmental and approach things with an open mind.  Be patient with yourself and trust yourself more.  MBSR has also been shown to:  Reduce negative emotions, such as depression and anxiety.  Improve memory and focus.  Change how you sense and approach pain.  Boost your body's ability to fight infections.  Help you connect better with other people.  Improve your sense of well-being.  Follow these instructions at home:       Find  a local in-person or online MBSR program.  Set aside some time regularly for mindfulness practice.  Find a mindfulness practice that works best for you. This may include one or more of the following:  Meditation. Meditation involves focusing your mind on a certain thought or activity.  Breathing awareness exercises. These help you to stay present by focusing on your breath.  Body scan. For this practice, you lie down and pay attention to each part of your body from head to toe. You can identify tension and soreness and intentionally relax parts of your body.  Yoga. Yoga involves stretching and breathing, and it can improve your ability to move and be flexible. It can also provide an experience of testing your body's limits, which can help you release stress.  Mindful eating. This way of eating involves focusing on the taste, texture, color, and smell of each bite of food. Because this slows down eating and helps you feel full sooner, it can be an important part of a weight-loss plan.  Find a podcast or recording that provides guidance for breathing awareness, body scan, or meditation exercises. You can listen to these any time when you have a free moment to rest without distractions.  Follow your treatment plan as told by your health care provider. This may include taking regular medicines and making changes to your diet or lifestyle as recommended.  How to practice mindfulness  To do a basic awareness exercise:  Find a comfortable place to sit.  Pay attention to the present moment. Observe your thoughts, feelings, and surroundings just as they are.  Avoid placing judgment on yourself, your feelings, or your surroundings. Make note of any judgment that comes up, and let it go.  Your mind may wander, and that is okay. Make note of when your thoughts drift, and return your attention to the present moment.  To do basic mindfulness meditation:  Find a comfortable place to sit. This may include a stable chair or a firm  floor cushion.  Sit upright with your back straight. Let your arms fall next to your side with your hands resting on your legs.  If sitting in a chair, rest your feet flat on the floor.  If sitting on a cushion, cross your legs in front of you.  Keep your head in a neutral position with your chin dropped slightly. Relax your jaw and rest the tip of your tongue on the roof of your mouth. Drop your gaze to the floor. You can close your eyes if you like.  Breathe normally and pay attention to your breath. Feel the air moving in and out of your nose. Feel your belly expanding and relaxing with each breath.  Your mind may wander, and that is okay. Make note of when your thoughts drift, and return your attention to your breath.  Avoid placing judgment on yourself, your feelings, or your surroundings. Make note of any judgment or feelings that come up, let them go, and bring your attention back to your breath.  When you are ready, lift your gaze or open your eyes. Pay attention to how your body feels after the meditation.  Where to find more information  You can find more information about MBSR from:  Your health care provider.  Community-based meditation centers or programs.  Programs offered near you.  Summary  Mindfulness-based stress reduction (MBSR) is a program that teaches you how to intentionally pay attention to the present moment. It is used with other treatments to help you cope better with daily stress, emotions, and pain.  MBSR focuses on developing self-awareness, which allows you to respond to life stress without judgment or negative emotions.  MBSR programs may involve learning different mindfulness practices, such as breathing exercises, meditation, yoga, body scan, or mindful eating. Find a mindfulness practice that works best for you, and set aside time for it on a regular basis.  This information is not intended to replace advice given to you by your health care provider. Make sure you discuss any  questions you have with your health care provider.  Document Revised: 02/22/2021 Document Reviewed: 04/26/2018  Elsevier Patient Education © 2021 [employer] Inc.

## 2022-04-12 ENCOUNTER — TELEPHONE (OUTPATIENT)
Dept: INTERNAL MEDICINE | Facility: CLINIC | Age: 57
End: 2022-04-12

## 2022-04-12 DIAGNOSIS — M54.41 CHRONIC RIGHT-SIDED LOW BACK PAIN WITH RIGHT-SIDED SCIATICA: ICD-10-CM

## 2022-04-12 DIAGNOSIS — G89.29 CHRONIC RIGHT-SIDED LOW BACK PAIN WITH RIGHT-SIDED SCIATICA: ICD-10-CM

## 2022-04-12 LAB
C TRACH RRNA SPEC QL NAA+PROBE: NEGATIVE
N GONORRHOEA RRNA SPEC QL NAA+PROBE: NEGATIVE
T VAGINALIS RRNA SPEC QL NAA+PROBE: NEGATIVE

## 2022-04-12 RX ORDER — GABAPENTIN 300 MG/1
600 CAPSULE ORAL 2 TIMES DAILY
Qty: 120 CAPSULE | Refills: 2 | Status: SHIPPED | OUTPATIENT
Start: 2022-04-12 | End: 2022-07-07

## 2022-04-12 NOTE — TELEPHONE ENCOUNTER
Caller: [employer] marin    Relationship: Self    Best call back number: [phone]    Requested Prescriptions:   Requested Prescriptions     Pending Prescriptions Disp Refills   • gabapentin (NEURONTIN) 300 MG capsule       Sig: Take 1 capsule by mouth 2 (Two) Times a Day. 2 capsules QAM and 2 capsules QPM        Pharmacy where request should be sent: [facility] #[id number] - [place] - [address] [facility] [phone] [facility] [phone] FX     Additional details provided by patient: PATIENT HAS ONE DAY WORTH OF THE MEDICATION LEFT.    Does the patient have less than a 3 day supply:  [x] Yes  [] No    Vincenzo Lees Rep   04/12/22 12:49 EDT

## 2022-04-12 NOTE — TELEPHONE ENCOUNTER
CONTROLLED MEDICATION REFILL REQUEST    STATE REGULATION APPT EVERY 3 MONTHS    UDS(URINE DRUG SCREEN) EVERY 6 MONTHS  NEW NARC CONSENT EVERY YEAR     URINE DRUG SCREEN: POC Urine Drug Screen [facility] (04/08/2022 12:46)    LAST OFFICE VISIT: Office Visit with Thierno Li MD (04/08/2022)    NARC CONSENT: CONTROLLED SUBSTANCE AGREEMENT - SCAN - 4/8/22 NARCOTIC CONSENT/ IMPE (04/08/2022)    NEXT OFFICE VISIT: Appointment with Thierno Li MD (07/08/2022)    MEDICATION: gabapentin (NEURONTIN) 300 MG capsule

## 2022-04-12 NOTE — TELEPHONE ENCOUNTER
Caller: Vita Cuevas    Relationship: Self    Best call back number: [phone]    Who are you requesting to speak with (clinical staff, provider,  specific staff member): MEDICAL STAFF    What was the call regarding: PATIENT WOULD LIKE A CALL FROM THE MEDICAL STAFF TO DISCUSS HER LAB RESULTS. SHE HAS A FEW QUESTIONS REGARDING THE LAB RESULTS. PLEASE CALL PATIENT TO ADVISE.

## 2022-04-13 NOTE — TELEPHONE ENCOUNTER
PT(PATIENT) VERIFIED     Lipid Panel (2022 12:36)    Lipids notable for elevated total and LDL cholesterol.  It isn't high enough to require medicines to treat.  I would recommend a heart healthy diet.  Thierno Basilio MD    PT(PATIENT) STATED UNDERSTANDING

## 2022-04-13 NOTE — TELEPHONE ENCOUNTER
ATTEMPTED TO CONTACT PT PER PROVIDER'S INSTRUCTIONS     NO ANSWER     LEFT VOICEMAIL WITH INSTRUCTIONS TO RETURN CALL TO OFFICE AT [phone]

## 2022-07-06 DIAGNOSIS — M54.41 CHRONIC RIGHT-SIDED LOW BACK PAIN WITH RIGHT-SIDED SCIATICA: ICD-10-CM

## 2022-07-06 DIAGNOSIS — G89.29 CHRONIC RIGHT-SIDED LOW BACK PAIN WITH RIGHT-SIDED SCIATICA: ICD-10-CM

## 2022-07-06 NOTE — TELEPHONE ENCOUNTER
CONTROLLED MEDICATION REFILL REQUEST    STATE REGULATION APPT EVERY 3 MONTHS    UDS(URINE DRUG SCREEN) EVERY 6 MONTHS    NEW NARC CONSENT EVERY YEAR     URINE DRUG SCREEN: POC Urine Drug Screen [facility] (04/08/2022 12:46)    LAST OFFICE VISIT: Office Visit with Thierno Li MD (04/08/2022)    NARC CONSENT: CONTROLLED SUBSTANCE AGREEMENT - SCAN - 4/8/22 NARCOTIC CONSENT/ IMPE (04/08/2022)    NEXT OFFICE VISIT: Appointment with Thierno Li MD (07/25/2022)    MEDICATION: gabapentin (NEURONTIN) 300 MG capsule (04/12/2022)

## 2022-07-07 RX ORDER — GABAPENTIN 300 MG/1
CAPSULE ORAL
Qty: 120 CAPSULE | Refills: 2 | Status: SHIPPED | OUTPATIENT
Start: 2022-07-07 | End: 2022-10-03

## 2022-09-05 ENCOUNTER — APPOINTMENT (OUTPATIENT)
Dept: GENERAL RADIOLOGY | Facility: HOSPITAL | Age: 57
End: 2022-09-05

## 2022-09-05 ENCOUNTER — HOSPITAL ENCOUNTER (EMERGENCY)
Facility: HOSPITAL | Age: 57
Discharge: HOME | End: 2022-09-05
Attending: EMERGENCY MEDICINE | Admitting: EMERGENCY MEDICINE

## 2022-09-05 VITALS
BODY MASS INDEX: 31.96 KG/M2 | DIASTOLIC BLOOD PRESSURE: 95 MMHG | TEMPERATURE: 98.2 F | HEIGHT: 65 IN | HEART RATE: 108 BPM | OXYGEN SATURATION: 95 % | WEIGHT: 191.8 LBS | RESPIRATION RATE: 18 BRPM | SYSTOLIC BLOOD PRESSURE: 165 MMHG

## 2022-09-05 DIAGNOSIS — K08.9 CHRONIC DENTAL PAIN: Primary | ICD-10-CM

## 2022-09-05 DIAGNOSIS — G89.29 CHRONIC DENTAL PAIN: Primary | ICD-10-CM

## 2022-09-05 DIAGNOSIS — K02.9 DENTAL CARIES: ICD-10-CM

## 2022-09-05 DIAGNOSIS — K04.7 DENTAL INFECTION: ICD-10-CM

## 2022-09-05 PROCEDURE — 70150 X-RAY EXAM OF FACIAL BONES: CPT

## 2022-09-05 PROCEDURE — 25010000002 KETOROLAC TROMETHAMINE PER 15 MG: Performed by: NURSE PRACTITIONER

## 2022-09-05 PROCEDURE — 96372 THER/PROPH/DIAG INJ SC/IM: CPT

## 2022-09-05 PROCEDURE — 99283 EMERGENCY DEPT VISIT LOW MDM: CPT

## 2022-09-05 RX ORDER — PENICILLIN V POTASSIUM 250 MG/1
500 TABLET ORAL ONCE
Status: COMPLETED | OUTPATIENT
Start: 2022-09-05 | End: 2022-09-05

## 2022-09-05 RX ORDER — PENICILLIN V POTASSIUM 500 MG/1
500 TABLET ORAL 4 TIMES DAILY
Qty: 40 TABLET | Refills: 0 | Status: SHIPPED | OUTPATIENT
Start: 2022-09-05 | End: 2022-12-28

## 2022-09-05 RX ORDER — LIDOCAINE HYDROCHLORIDE 20 MG/ML
10 SOLUTION OROPHARYNGEAL
Status: DISCONTINUED | OUTPATIENT
Start: 2022-09-05 | End: 2022-09-05 | Stop reason: HOSPADM

## 2022-09-05 RX ORDER — KETOROLAC TROMETHAMINE 30 MG/ML
30 INJECTION, SOLUTION INTRAMUSCULAR; INTRAVENOUS ONCE
Status: COMPLETED | OUTPATIENT
Start: 2022-09-05 | End: 2022-09-05

## 2022-09-05 RX ORDER — IBUPROFEN 800 MG/1
800 TABLET ORAL EVERY 8 HOURS PRN
Qty: 60 TABLET | Refills: 0 | Status: SHIPPED | OUTPATIENT
Start: 2022-09-05 | End: 2022-12-28

## 2022-09-05 RX ADMIN — PENICILLIN V POTASSIUM 500 MG: 250 TABLET, FILM COATED ORAL at 09:12

## 2022-09-05 RX ADMIN — BENZOCAINE 2 SPRAY: 200 SPRAY DENTAL; ORAL; PERIODONTAL at 09:13

## 2022-09-05 RX ADMIN — KETOROLAC TROMETHAMINE 30 MG: 30 INJECTION, SOLUTION INTRAMUSCULAR; INTRAVENOUS at 10:19

## 2022-09-05 RX ADMIN — LIDOCAINE HYDROCHLORIDE 10 ML: 20 SOLUTION ORAL; TOPICAL at 09:13

## 2022-09-05 RX ADMIN — IBUPROFEN 600 MG: 100 SUSPENSION ORAL at 09:13

## 2022-09-05 NOTE — DISCHARGE INSTRUCTIONS
Please call and schedule an appointment with a dentist as soon as possible.  Your x-ray did not show any abscess at this time.  It is very possible you do have an infection neck potentially turn into an abscess.  Take your medications as prescribed.  Use the dental balls as needed for any discomfort.  Continue alternating Tylenol and ibuprofen as needed for any pain or discomfort.    Additionally, please take your blood pressure medication every day as prescribed.  It is extremely important that you monitor your blood pressure and continue taking your medications every day.  Please take your blood pressure and keep a log of your blood pressure readings and take with you to your next appointment with your family doctor.    If you develop fever greater than 100.4, severe worsening facial pain or swelling, inability to swallow, chest pain, shortness of breath, please return to the emergency department.

## 2022-09-05 NOTE — ED PROVIDER NOTES
Time: 10:12 EDT  Arrived by: Private vehicle  Chief Complaint: Dental pain  History provided by: Patient  History is limited by: N/A    History of Present Illness:  Patient is a 57 y.o. year old female that presents to the emergency department with complaints of right-sided dental pain.  Patient reports a long history of dental issues.  She reports she has multiple broken and missing teeth.  She states she does not have any dental insurance at this time.  She states she has not been able to see a dentist due to lack of insurance and ability to pay.  She reports she knows that she needs her teeth extracted.  Pain to her right lower and upper teeth have progressively worsened over the last 3 days.  She denies fever.  She denies any drainage but she states that she thinks that she has an abscess to her right lower jaw.  She reports some right-sided facial swelling.  She states she has been taking Tylenol and ibuprofen without any relief.    Additionally, the patient was noted to have an elevated blood pressure upon arrival.  She reports a history of hypertension that is treated with benazepril and hydrochlorothiazide.  Patient states that she has not been taking her medication regularly as she tries to preserve and save her prescriptions since she does not have any insurance at this time.  She reports that she has difficulty being able to afford her medications.  She denies any dizziness.  Denies any chest pain or shortness of breath.      History provided by:  Patient   used: No    Dental Pain  Location:  Upper and lower  Upper teeth location: Multiple teeth.  Lower teeth location: Multiple teeth.  Quality:  Aching, pulsating, throbbing and sharp  Severity:  Moderate  Onset quality:  Gradual  Duration:  3 days  Timing:  Constant  Progression:  Worsening  Chronicity:  Chronic  Context: abscess, dental caries, enamel fracture and poor dentition    Context: cap still on, not crown fracture, not recent  dental surgery and not trauma    Relieved by:  Nothing  Worsened by:  Touching, jaw movement, cold food/drink and hot food/drink  Ineffective treatments:  Acetaminophen and NSAIDs  Associated symptoms: facial pain, facial swelling, gum swelling, headaches and trismus    Associated symptoms: no congestion, no difficulty swallowing, no drooling, no fever, no neck pain, no neck swelling, no oral bleeding and no oral lesions    Risk factors: lack of dental care, periodontal disease and smoking    Risk factors: no alcohol problem, no chewing tobacco use and no diabetes            Similar Symptoms Previously: No  Recently seen: No      Patient Care Team  Primary Care Provider: Thierno Li MD    Past Medical History:     No Known Allergies  Past Medical History:   Diagnosis Date   • Anxiety    • Endometriosis    • Hypertension    • Kidney stone    • Other insomnia    • PTSD (post-traumatic stress disorder)      Past Surgical History:   Procedure Laterality Date   •  SECTION      x3   • KIDNEY STONE SURGERY     • OTHER SURGICAL HISTORY      Endometriosis     Family History   Adopted: Yes       Home Medications:  Prior to Admission medications    Medication Sig Start Date End Date Taking? Authorizing Provider   benazepril-hydrochlorthiazide (LOTENSIN HCT) 20-12.5 MG per tablet Take 1 tablet by mouth Daily. 22   Thierno Li MD   gabapentin (NEURONTIN) 300 MG capsule TAKE 2 CAPSULES BY MOUTH 2 TIMES A DAY. 22   Thierno Li MD   zolpidem CR (Ambien CR) 6.25 MG CR tablet Take 1 tablet by mouth At Night As Needed for Sleep. 22   Thierno Li MD        Social History:   PT  reports that she has been smoking cigarettes. She has a 18.50 pack-year smoking history. She has never used smokeless tobacco. She reports that she does not drink alcohol and does not use drugs.    Record Review:  I have reviewed the patient's records in [facility] [employer].     Review of Systems  Review of Systems   Constitutional:  "Negative for chills, fatigue and fever.   HENT: Positive for dental problem and facial swelling. Negative for congestion, drooling, mouth sores, rhinorrhea and sore throat.    Eyes: Negative.    Respiratory: Negative for cough, chest tightness and shortness of breath.    Cardiovascular: Negative for chest pain and palpitations.   Gastrointestinal: Negative for abdominal pain, diarrhea, nausea and vomiting.   Endocrine: Negative.    Genitourinary: Negative for decreased urine volume, difficulty urinating, flank pain, frequency, hematuria and urgency.   Musculoskeletal: Negative for arthralgias, myalgias and neck pain.   Skin: Negative for color change, rash and wound.   Allergic/Immunologic: Negative.    Neurological: Positive for headaches. Negative for dizziness, syncope, weakness and light-headedness.   Hematological: Negative.    Psychiatric/Behavioral: Negative for agitation and confusion. The patient is not nervous/anxious.         Physical Exam  /90   Pulse 106   Temp 98.2 °F (36.8 °C) (Oral)   Resp 18   Ht 165.1 cm (65\")   Wt 87 kg (191 lb 12.8 oz)   SpO2 94%   BMI 31.92 kg/m²     Physical Exam  Vitals and nursing note reviewed.   Constitutional:       General: She is not in acute distress.     Appearance: Normal appearance. She is not ill-appearing or toxic-appearing.   HENT:      Head: Normocephalic and atraumatic.      Jaw: Trismus, tenderness and swelling present.      Right Ear: Tympanic membrane normal.      Left Ear: Tympanic membrane normal.      Nose: Nose normal. No congestion.      Mouth/Throat:      Mouth: Mucous membranes are moist.      Dentition: Abnormal dentition. Dental tenderness, gingival swelling and dental caries present. No gum lesions.      Pharynx: Oropharynx is clear.        Comments: Patient has multiple missing teeth to upper and lower locations bilaterally  Eyes:      Extraocular Movements: Extraocular movements intact.      Pupils: Pupils are equal, round, and " "reactive to light.   Cardiovascular:      Rate and Rhythm: Normal rate and regular rhythm.      Pulses: Normal pulses.      Heart sounds: Normal heart sounds. No murmur heard.    No gallop.   Pulmonary:      Effort: Pulmonary effort is normal. No respiratory distress.      Breath sounds: Normal breath sounds. No wheezing, rhonchi or rales.   Chest:      Chest wall: No tenderness.   Abdominal:      General: Bowel sounds are normal. There is no distension.      Palpations: Abdomen is soft.      Tenderness: There is no abdominal tenderness.   Musculoskeletal:         General: No tenderness. Normal range of motion.      Cervical back: Normal range of motion and neck supple.   Skin:     General: Skin is warm and dry.      Findings: No erythema or rash.   Neurological:      Mental Status: She is alert and oriented to person, place, and time.      Motor: No weakness.   Psychiatric:         Mood and Affect: Mood normal.         Behavior: Behavior normal.                  ED Course  /90   Pulse 106   Temp 98.2 °F (36.8 °C) (Oral)   Resp 18   Ht 165.1 cm (65\")   Wt 87 kg (191 lb 12.8 oz)   SpO2 94%   BMI 31.92 kg/m²   Results for orders placed or performed in visit on 04/08/22   Chlamydia trachomatis, Neisseria gonorrhoeae, Trichomonas vaginalis, PCR - Urine, Urine, Clean Catch    Specimen: Urine, Clean Catch   Result Value Ref Range    Chlamydia trachomatis, KAYA Negative Negative    Gonococcus by KAYA Negative Negative    Trichomonas vaginosis Negative Negative   Comprehensive Metabolic Panel    Specimen: Arm, Left; Blood   Result Value Ref Range    Glucose 78 65 - 99 mg/dL    BUN 13 6 - 20 mg/dL    Creatinine 0.80 0.57 - 1.00 mg/dL    Sodium 138 136 - 145 mmol/L    Potassium 3.9 3.5 - 5.2 mmol/L    Chloride 103 98 - 107 mmol/L    CO2 24.6 22.0 - 29.0 mmol/L    Calcium 9.3 8.6 - 10.5 mg/dL    Total Protein 7.4 6.0 - 8.5 g/dL    Albumin 4.60 3.50 - 5.20 g/dL    ALT (SGPT) 17 1 - 33 U/L    AST (SGOT) 21 1 - 32 U/L "    Alkaline Phosphatase 92 39 - 117 U/L    Total Bilirubin 0.3 0.0 - 1.2 mg/dL    Globulin 2.8 gm/dL    A/G Ratio 1.6 g/dL    BUN/Creatinine Ratio 16.3 7.0 - 25.0    Anion Gap 10.4 5.0 - 15.0 mmol/L    eGFR 87.1 >60.0 mL/min/1.73   Hepatitis C Antibody    Specimen: Arm, Left; Blood   Result Value Ref Range    Hepatitis C Ab Non-Reactive Non-Reactive   Lipid Panel    Specimen: Arm, Left; Blood   Result Value Ref Range    Total Cholesterol 211 (H) 0 - 200 mg/dL    Triglycerides 111 0 - 150 mg/dL    HDL Cholesterol 54 40 - 60 mg/dL    LDL Cholesterol  137 (H) 0 - 100 mg/dL    VLDL Cholesterol 20 5 - 40 mg/dL    LDL/HDL Ratio 2.50    TSH    Specimen: Arm, Left; Blood   Result Value Ref Range    TSH 1.310 0.270 - 4.200 uIU/mL   CBC Auto Differential    Specimen: Arm, Left; Blood   Result Value Ref Range    WBC 6.01 3.40 - 10.80 10*3/mm3    RBC 4.41 3.77 - 5.28 10*6/mm3    Hemoglobin 13.5 12.0 - 15.9 g/dL    Hematocrit 39.9 34.0 - 46.6 %    MCV 90.5 79.0 - 97.0 fL    MCH 30.6 26.6 - 33.0 pg    MCHC 33.8 31.5 - 35.7 g/dL    RDW 12.9 12.3 - 15.4 %    RDW-SD 42.5 37.0 - 54.0 fl    MPV 11.9 6.0 - 12.0 fL    Platelets 216 140 - 450 10*3/mm3    Neutrophil % 49.4 42.7 - 76.0 %    Lymphocyte % 42.1 19.6 - 45.3 %    Monocyte % 6.3 5.0 - 12.0 %    Eosinophil % 0.8 0.3 - 6.2 %    Basophil % 1.2 0.0 - 1.5 %    Immature Grans % 0.2 0.0 - 0.5 %    Neutrophils, Absolute 2.97 1.70 - 7.00 10*3/mm3    Lymphocytes, Absolute 2.53 0.70 - 3.10 10*3/mm3    Monocytes, Absolute 0.38 0.10 - 0.90 10*3/mm3    Eosinophils, Absolute 0.05 0.00 - 0.40 10*3/mm3    Basophils, Absolute 0.07 0.00 - 0.20 10*3/mm3    Immature Grans, Absolute 0.01 0.00 - 0.05 10*3/mm3    nRBC 0.0 0.0 - 0.2 /100 WBC   POC Urine Drug Screen [facility]    Specimen: Urine   Result Value Ref Range    Amphetamine Screen, Urine Negative Negative    AMP INTERNAL CONTROL Passed Passed    Barbiturates Screen, Urine Negative Negative    BARBITURATE INTERNAL CONTROL Passed Passed     Buprenorphine, Screen, Urine Negative Negative    BUPRENORPHINE INTERNAL CONTROL Passed Passed    Benzodiazepine Screen, Urine Negative Negative    BENZODIAZEPINE INTERNAL CONTROL Passed Passed    Cocaine Screen, Urine Negative Negative    COCAINE INTERNAL CONTROL Passed Passed    MDMA (ECSTASY) Negative Negative    MDMA (ECSTASY) INTERNAL CONTROL Passed Passed    Methamphetamine, Ur Negative Negative    METHAMPHETAMINE INTERNAL CONTROL Passed Passed    Methadone Screen, Urine Negative Negative    METHADONE INTERNAL CONTROL Passed Passed    Opiate Screen Negative Negative    OPIATES INTERNAL CONTROL Passed Passed    Oxycodone Screen, Urine Negative Negative    OXYCODONE INTERNAL CONTROL Passed Passed    Phencyclidine (PCP), Urine Negative Negative    PHENCYCLIDINE INTERNAL CONTROL Passed Passed    THC, Screen, Urine Negative Negative    THC INTERNAL CONTROL Passed Passed    Lot Number [id number]     Expiration Date 03/31/2023      Medications   Lidocaine Viscous HCl (XYLOCAINE) 2 % solution 10 mL (10 mL Mouth/Throat Given 9/5/22 0913)   ketorolac (TORADOL) injection 30 mg (has no administration in time range)   penicillin v potassium (VEETID) tablet 500 mg (500 mg Oral Given 9/5/22 0912)   ibuprofen (ADVIL,MOTRIN) 100 MG/5ML suspension 600 mg (600 mg Oral Given 9/5/22 0913)   Benzocaine 20 % aerosol 2 spray (2 sprays Mouth/Throat Given 9/5/22 0913)     XR Facial Bones 3+ View    Result Date: 9/5/2022  Narrative: PROCEDURE: XR FACIAL BONES 3+ VW  COMPARISON: None  INDICATIONS: right jaw pain/facial swelling  FINDINGS:   There are multiple missing and carious teeth.  No fractures or destructive osseous changes are identified.  The paranasal sinuses are grossly clear.  No air-fluid levels are identified.  The right frontal sinus is hypoplastic.  IMPRESSION: No evidence of acute osseous abnormality or air-fluid level in the paranasal sinuses.   ALBERTO CHRISTINA MD       Electronically Signed and Approved By: ALBERTO SELBY  MD CARRI on 9/05/2022 at 9:10               Procedures/EKGs:  Procedures      Medical Decision Making:                     MDM  Number of Diagnoses or Management Options  Chronic dental pain  Dental caries  Dental infection  Diagnosis management comments: I have spoken with the patient. I have explained the patient´s condition, diagnoses and treatment plan based on the information available to me at this time. I have answered the patient's questions and addressed any concerns. The patient has a good  understanding of the patient´s diagnosis, condition, and treatment plan as can be expected at this point. The vital signs have been stable. The patient´s condition is stable and appropriate for discharge from the emergency department.      The patient will pursue further outpatient evaluation with the primary care physician or other designated or consulting physician as outlined in the discharge instructions. They are agreeable to this plan of care and follow-up instructions have been explained in detail. The patient has received these instructions in written format and have expressed an understanding of the discharge instructions. The patient is aware that any significant change in condition or worsening of symptoms should prompt an immediate return to this or the closest emergency department or call to 911.    The x-ray imaging that was performed today did not show any evidence of dental abscess.  Patient was treated for dental infection as well as for pain control while in the emergency department.  She was instructed and educated on the importance of calling a dentist for further treatment and evaluation.  She was additionally educated on the importance of compliance with taking her daily medications for her high blood pressure.  Her vitals are stable.  She is afebrile.  Patient verbalized understanding.       Amount and/or Complexity of Data Reviewed  Tests in the radiology section of CPT®: reviewed and  ordered  Tests in the medicine section of CPT®: ordered and reviewed  Review and summarize past medical records: yes  Independent visualization of images, tracings, or specimens: yes    Risk of Complications, Morbidity, and/or Mortality  Presenting problems: moderate  Diagnostic procedures: moderate  Management options: moderate    Patient Progress  Patient progress: stable       Final diagnoses:   Chronic dental pain   Dental caries   Dental infection        Disposition:  ED Disposition     ED Disposition   Discharge    Condition   Stable    Comment   --              Kellee Leiva, APRN  09/05/22 1012

## 2022-10-02 DIAGNOSIS — G89.29 CHRONIC RIGHT-SIDED LOW BACK PAIN WITH RIGHT-SIDED SCIATICA: ICD-10-CM

## 2022-10-02 DIAGNOSIS — M54.41 CHRONIC RIGHT-SIDED LOW BACK PAIN WITH RIGHT-SIDED SCIATICA: ICD-10-CM

## 2022-10-03 RX ORDER — GABAPENTIN 300 MG/1
CAPSULE ORAL
Qty: 120 CAPSULE | Refills: 2 | Status: SHIPPED | OUTPATIENT
Start: 2022-10-03 | End: 2022-12-28 | Stop reason: SDUPTHER

## 2022-10-03 NOTE — TELEPHONE ENCOUNTER
Refill request for  controlled substance.      Date of request: 10/3/2022    Medication requested: Gabapentin  Last OV: 4/8/2022  Last UDS: 4/8/2022  Contract signed: yes    Date:4/8/2022  Next office visit: does not have one scheduled, has cancelled last 2 appts with you    Gloria Balderas

## 2022-12-26 DIAGNOSIS — G89.29 CHRONIC RIGHT-SIDED LOW BACK PAIN WITH RIGHT-SIDED SCIATICA: ICD-10-CM

## 2022-12-26 DIAGNOSIS — M54.41 CHRONIC RIGHT-SIDED LOW BACK PAIN WITH RIGHT-SIDED SCIATICA: ICD-10-CM

## 2022-12-27 NOTE — TELEPHONE ENCOUNTER
Refill request for  controlled substance.      Date of request: 12/27/2022  (Please change date if request date is different than today's)  Medication requested: Gabapentin  Last OV: 4/8/22  Last UDS: 4/8/22 DUE  Contract signed: yes    Date:4/8/22  Next office visit: 12/28/22    Sylvia Shen

## 2022-12-28 ENCOUNTER — OFFICE VISIT (OUTPATIENT)
Dept: INTERNAL MEDICINE | Facility: CLINIC | Age: 57
End: 2022-12-28

## 2022-12-28 VITALS
OXYGEN SATURATION: 96 % | DIASTOLIC BLOOD PRESSURE: 120 MMHG | SYSTOLIC BLOOD PRESSURE: 220 MMHG | HEIGHT: 65 IN | BODY MASS INDEX: 31.16 KG/M2 | TEMPERATURE: 98.3 F | WEIGHT: 187 LBS | HEART RATE: 93 BPM

## 2022-12-28 DIAGNOSIS — Z12.31 SCREENING MAMMOGRAM FOR BREAST CANCER: ICD-10-CM

## 2022-12-28 DIAGNOSIS — G47.09 OTHER INSOMNIA: ICD-10-CM

## 2022-12-28 DIAGNOSIS — B35.1 ONYCHOMYCOSIS: ICD-10-CM

## 2022-12-28 DIAGNOSIS — D25.9 UTERINE LEIOMYOMA, UNSPECIFIED LOCATION: ICD-10-CM

## 2022-12-28 DIAGNOSIS — Z79.899 MEDICATION MANAGEMENT: ICD-10-CM

## 2022-12-28 DIAGNOSIS — G25.81 RESTLESS LEG SYNDROME: ICD-10-CM

## 2022-12-28 DIAGNOSIS — R22.41 LOCALIZED SWELLING OF TOE OF RIGHT FOOT: ICD-10-CM

## 2022-12-28 DIAGNOSIS — R10.9 ABDOMINAL PAIN, UNSPECIFIED ABDOMINAL LOCATION: ICD-10-CM

## 2022-12-28 DIAGNOSIS — I10 ESSENTIAL HYPERTENSION: ICD-10-CM

## 2022-12-28 DIAGNOSIS — Z12.11 ENCOUNTER FOR SCREENING FOR MALIGNANT NEOPLASM OF COLON: ICD-10-CM

## 2022-12-28 DIAGNOSIS — M54.41 CHRONIC RIGHT-SIDED LOW BACK PAIN WITH RIGHT-SIDED SCIATICA: ICD-10-CM

## 2022-12-28 DIAGNOSIS — Z23 ENCOUNTER FOR IMMUNIZATION: ICD-10-CM

## 2022-12-28 DIAGNOSIS — I16.0 HYPERTENSIVE URGENCY: Primary | ICD-10-CM

## 2022-12-28 DIAGNOSIS — F17.210 CIGARETTE NICOTINE DEPENDENCE WITHOUT COMPLICATION: ICD-10-CM

## 2022-12-28 DIAGNOSIS — G89.29 CHRONIC RIGHT-SIDED LOW BACK PAIN WITH RIGHT-SIDED SCIATICA: ICD-10-CM

## 2022-12-28 LAB
ALBUMIN SERPL-MCNC: 4.5 G/DL (ref 3.5–5.2)
ALBUMIN/GLOB SERPL: 1.6 G/DL
ALP SERPL-CCNC: 106 U/L (ref 39–117)
ALT SERPL W P-5'-P-CCNC: 20 U/L (ref 1–33)
AMPHET+METHAMPHET UR QL: NEGATIVE
AMPHETAMINE INTERNAL CONTROL: NORMAL
AMPHETAMINES UR QL: NEGATIVE
ANION GAP SERPL CALCULATED.3IONS-SCNC: 10 MMOL/L (ref 5–15)
AST SERPL-CCNC: 17 U/L (ref 1–32)
BARBITURATE INTERNAL CONTROL: NORMAL
BARBITURATES UR QL SCN: NEGATIVE
BENZODIAZ UR QL SCN: NEGATIVE
BENZODIAZEPINE INTERNAL CONTROL: NORMAL
BILIRUB SERPL-MCNC: 0.4 MG/DL (ref 0–1.2)
BUN SERPL-MCNC: 22 MG/DL (ref 6–20)
BUN/CREAT SERPL: 24.2 (ref 7–25)
BUPRENORPHINE INTERNAL CONTROL: NORMAL
BUPRENORPHINE SERPL-MCNC: NEGATIVE NG/ML
CALCIUM SPEC-SCNC: 9.4 MG/DL (ref 8.6–10.5)
CANNABINOIDS SERPL QL: NEGATIVE
CHLORIDE SERPL-SCNC: 107 MMOL/L (ref 98–107)
CO2 SERPL-SCNC: 24 MMOL/L (ref 22–29)
COCAINE INTERNAL CONTROL: NORMAL
COCAINE UR QL: NEGATIVE
CREAT SERPL-MCNC: 0.91 MG/DL (ref 0.57–1)
EGFRCR SERPLBLD CKD-EPI 2021: 74.2 ML/MIN/1.73
EXPIRATION DATE: NORMAL
GLOBULIN UR ELPH-MCNC: 2.9 GM/DL
GLUCOSE SERPL-MCNC: 82 MG/DL (ref 65–99)
Lab: NORMAL
MDMA (ECSTASY) INTERNAL CONTROL: NORMAL
MDMA UR QL SCN: NEGATIVE
METHADONE INTERNAL CONTROL: NORMAL
METHADONE UR QL SCN: NEGATIVE
METHAMPHETAMINE INTERNAL CONTROL: NORMAL
OPIATES INTERNAL CONTROL: NORMAL
OPIATES UR QL: NEGATIVE
OXYCODONE INTERNAL CONTROL: NORMAL
OXYCODONE UR QL SCN: NEGATIVE
PCP UR QL SCN: NEGATIVE
PHENCYCLIDINE INTERNAL CONTROL: NORMAL
POTASSIUM SERPL-SCNC: 3.4 MMOL/L (ref 3.5–5.2)
PROT SERPL-MCNC: 7.4 G/DL (ref 6–8.5)
SODIUM SERPL-SCNC: 141 MMOL/L (ref 136–145)
THC INTERNAL CONTROL: NORMAL
URATE SERPL-MCNC: 4.4 MG/DL (ref 2.4–5.7)

## 2022-12-28 PROCEDURE — 93000 ELECTROCARDIOGRAM COMPLETE: CPT | Performed by: STUDENT IN AN ORGANIZED HEALTH CARE EDUCATION/TRAINING PROGRAM

## 2022-12-28 PROCEDURE — 99214 OFFICE O/P EST MOD 30 MIN: CPT | Performed by: STUDENT IN AN ORGANIZED HEALTH CARE EDUCATION/TRAINING PROGRAM

## 2022-12-28 PROCEDURE — 80305 DRUG TEST PRSMV DIR OPT OBS: CPT | Performed by: STUDENT IN AN ORGANIZED HEALTH CARE EDUCATION/TRAINING PROGRAM

## 2022-12-28 PROCEDURE — 84550 ASSAY OF BLOOD/URIC ACID: CPT | Performed by: STUDENT IN AN ORGANIZED HEALTH CARE EDUCATION/TRAINING PROGRAM

## 2022-12-28 PROCEDURE — 80053 COMPREHEN METABOLIC PANEL: CPT | Performed by: STUDENT IN AN ORGANIZED HEALTH CARE EDUCATION/TRAINING PROGRAM

## 2022-12-28 RX ORDER — CHLORTHALIDONE 25 MG/1
25 TABLET ORAL DAILY
Qty: 90 TABLET | Refills: 2 | Status: SHIPPED | OUTPATIENT
Start: 2022-12-28

## 2022-12-28 RX ORDER — TERBINAFINE HYDROCHLORIDE 250 MG/1
250 TABLET ORAL DAILY
Qty: 30 TABLET | Refills: 1 | Status: SHIPPED | OUTPATIENT
Start: 2022-12-28 | End: 2023-02-08

## 2022-12-28 RX ORDER — AMLODIPINE BESYLATE AND BENAZEPRIL HYDROCHLORIDE 10; 20 MG/1; MG/1
1 CAPSULE ORAL DAILY
Qty: 90 CAPSULE | Refills: 2 | Status: SHIPPED | OUTPATIENT
Start: 2022-12-28

## 2022-12-28 RX ORDER — GABAPENTIN 300 MG/1
600 CAPSULE ORAL 2 TIMES DAILY
Qty: 120 CAPSULE | Refills: 2 | Status: SHIPPED | OUTPATIENT
Start: 2022-12-28 | End: 2023-02-23

## 2022-12-28 RX ORDER — GABAPENTIN 300 MG/1
600 CAPSULE ORAL 2 TIMES DAILY
Qty: 120 CAPSULE | Refills: 2 | Status: CANCELLED | OUTPATIENT
Start: 2022-12-28

## 2022-12-28 NOTE — PROGRESS NOTES
"Chief Complaint  Hypertension, Abdominal Pain (Right Lower abdominal pain/ gyn refferal), and Foot Problem (Toenail fungus, small toe on right foot swollen and sore)    Subjective          Hope Serena Cuevas presents to [facility]  History of Present Illness    Elevated BP to 220/120 today.  Reports compliatn with lotensin.  Reports RLQ pain at times, and requesting Ob/Gyn referral.  No abdominal pain currently.  Denies chest pain (none at present, and none over the last few days).  Does not own BP cuff.    No known history of CVA or MI.    Gabapentin helps with back pain as well as restless leg.    Down to 2 cigarettes per week.    Reports for quite some time has had a fungus on right great toe, as well as swelling right pinkie toe, and requesting podiatry referral.      Current Outpatient Medications   Medication Instructions   • amLODIPine-benazepril (Lotrel) 10-20 MG per capsule 1 capsule, Oral, Daily   • chlorthalidone (HYGROTON) 25 mg, Oral, Daily   • gabapentin (NEURONTIN) 600 mg, Oral, 2 Times Daily   • terbinafine (LAMISIL) 250 mg, Oral, Daily       The following portions of the patient's history were reviewed and updated as appropriate: allergies, current medications, past family history, past medical history, past social history, past surgical history, and problem list.    Objective   Vital Signs:   BP (!) 220/120 (BP Location: Left arm, Patient Position: Sitting, Cuff Size: Adult) Comment: MANUAL RECHECK  Pulse 93   Temp 98.3 °F (36.8 °C) (Temporal)   Ht 165.1 cm (65\")   Wt 84.8 kg (187 lb)   SpO2 96%   BMI 31.12 kg/m²     Wt Readings from Last 3 Encounters:   12/28/22 84.8 kg (187 lb)   09/05/22 87 kg (191 lb 12.8 oz)   04/08/22 89.8 kg (198 lb)     BP Readings from Last 3 Encounters:   12/28/22 (!) 220/120   09/05/22 165/95   04/08/22 144/89     Physical Exam  Vitals reviewed.   Constitutional:       General: She is not in acute distress.     " Appearance: Normal appearance. She is not ill-appearing, toxic-appearing or diaphoretic.   HENT:      Head: Normocephalic and atraumatic.      Right Ear: External ear normal.      Left Ear: External ear normal.      Mouth/Throat:      Mouth: Mucous membranes are moist.      Pharynx: Oropharynx is clear.      Comments: Poor dentition  Eyes:      Conjunctiva/sclera: Conjunctivae normal.   Cardiovascular:      Rate and Rhythm: Normal rate and regular rhythm.      Pulses: Normal pulses.      Heart sounds: Normal heart sounds. No murmur heard.    No friction rub. No gallop.   Pulmonary:      Effort: Pulmonary effort is normal. No respiratory distress.      Breath sounds: Normal breath sounds. No stridor. No wheezing, rhonchi or rales.   Chest:      Chest wall: No tenderness.   Abdominal:      General: Abdomen is flat.      Palpations: Abdomen is soft. There is no mass.      Tenderness: There is no abdominal tenderness.   Musculoskeletal:      Right lower leg: No edema.      Left lower leg: No edema.      Comments: Right great toe, onychomycosis noted.  Right pinkie toe, swelling noted   Skin:     General: Skin is warm and dry.   Neurological:      General: No focal deficit present.      Mental Status: She is alert. Mental status is at baseline.      Cranial Nerves: No cranial nerve deficit.   Psychiatric:         Mood and Affect: Mood normal.         Behavior: Behavior normal.         Thought Content: Thought content normal.         Judgment: Judgment normal.       Result Review :   The following data was reviewed by: Thierno Li MD on 12/28/2022:  Common labs    Common Labs 3/13/22 3/13/22 4/8/22 4/8/22 4/8/22    1112 1112 1236 1236 1236   Glucose  90  78    BUN  18  13    Creatinine  0.82  0.80    Sodium  141  138    Potassium  4.2  3.9    Chloride  107  103    Calcium  9.7  9.3    Albumin  4.30  4.60    Total Bilirubin  0.5  0.3    Alkaline Phosphatase  88  92    AST (SGOT)  18  21    ALT (SGPT)  15  17    WBC  5.13  6.01     Hemoglobin 13.8  13.5     Hematocrit 40.5  39.9     Platelets 205  216     Total Cholesterol     211 (A)   Triglycerides     111   HDL Cholesterol     54   LDL Cholesterol      137 (A)   (A) Abnormal value                   Lab Results   Component Value Date    BILIRUBINUR Negative 03/13/2022       Procedures       EKG  Sinus rhythm, rate 85 BPM  Left axis deviation  No evidence of hypertrophy  No evidence of ischemia  QTc 449 ms  No significant change when compared to 1/25/21 EKG (media tab under 6/14/22 External Medical Records)     Assessment and Plan    Diagnoses and all orders for this visit:    1. Hypertensive urgency (Primary)  -     ECG 12 Lead  -     Comprehensive Metabolic Panel    2. Chronic right-sided low back pain with right-sided sciatica  -     POC Urine Drug Screen [facility]  -     gabapentin (NEURONTIN) 300 MG capsule; Take 2 capsules by mouth 2 (Two) Times a Day.  Dispense: 120 capsule; Refill: 2    3. Other insomnia  -     POC Urine Drug Screen [facility]    4. Medication management  -     POC Urine Drug Screen [facility]    5. Encounter for screening for malignant neoplasm of colon  -     Ambulatory Referral For Screening Colonoscopy    6. Abdominal pain, unspecified abdominal location  -     ECG 12 Lead  -     Ambulatory Referral to Obstetrics / Gynecology    7. Uterine leiomyoma, unspecified location  -     Ambulatory Referral to Obstetrics / Gynecology    8. Cigarette nicotine dependence without complication    9. Screening mammogram for breast cancer  -     Mammo Screening Bilateral With CAD; Future    10. Essential hypertension  -     chlorthalidone (HYGROTON) 25 MG tablet; Take 1 tablet by mouth Daily.  Dispense: 90 tablet; Refill: 2  -     amLODIPine-benazepril (Lotrel) 10-20 MG per capsule; Take 1 capsule by mouth Daily.  Dispense: 90 capsule; Refill: 2    11. Restless leg syndrome    12. Onychomycosis  -     terbinafine (lamiSIL) 250 MG tablet; Take 1  tablet by mouth Daily for 42 days.  Dispense: 30 tablet; Refill: 1  -     Ambulatory Referral to Podiatry    13. Localized swelling of toe of right foot  -     Uric Acid  -     Ambulatory Referral to Podiatry    14. Encounter for immunization  -     Pneumococcal Conjugate Vaccine 20-Valent (PCV20); Future  -     FluLaval/Fluzone >6 mos (4747-4154); Future  -     Tdap Vaccine Greater Than or Equal To 8yo IM; Future  -     Varicella-Zoster Vaccine Subcutaneous; Future      HTN urgency:  -blood pressure above goal to an alarming degree, but no evidence of stroke, EKG reassuring, no chest pain, and no evidence of end organ damage  -I will obtain labwork to check renal function  -I will stop her combo pill and start a new regimen: chlorthalidone plus lotrel  -I did  her today on a low sodium diet  -I will see her again in one month's time to reassess    LBP and restless leg:  -doing well on gabapentin, and we will continue her current regimen    Tobacco:  -counseled on the importance of tobacco cessation    Immunizations:  -Discussed risks/benefits to vaccination, reviewed components of the vaccine, discussed VIS, discussed informed consent, informed consent obtained. Patient/Parent was allowed to accept or refuse vaccine. Questions answered to satisfactory state of patient/parent. We reviewed typical age appropriate and seasonally appropriate vaccinations. Reviewed immunization history and updated state vaccination form as needed. Patient/Parent was counseled on the above vaccines.      Medications Discontinued During This Encounter   Medication Reason   • ibuprofen (ADVIL,MOTRIN) 800 MG tablet    • penicillin v potassium (VEETID) 500 MG tablet    • zolpidem CR (Ambien CR) 6.25 MG CR tablet    • benazepril-hydrochlorthiazide (LOTENSIN HCT) 20-12.5 MG per tablet    • gabapentin (NEURONTIN) 300 MG capsule Reorder          Follow Up   Return in about 1 month (around 1/28/2023) for HTN.  Patient was given  instructions and counseling regarding her condition or for health maintenance advice. Please see specific information pulled into the AVS if appropriate.       Thierno Li MD  12/28/22  16:51 EST

## 2022-12-29 DIAGNOSIS — E87.6 HYPOKALEMIA: Primary | ICD-10-CM

## 2022-12-29 RX ORDER — GABAPENTIN 300 MG/1
CAPSULE ORAL
Qty: 120 CAPSULE | OUTPATIENT
Start: 2022-12-29

## 2022-12-29 RX ORDER — POTASSIUM CHLORIDE 750 MG/1
10 TABLET, EXTENDED RELEASE ORAL 2 TIMES DAILY
Qty: 180 TABLET | Refills: 2 | Status: SHIPPED | OUTPATIENT
Start: 2022-12-29

## 2022-12-30 ENCOUNTER — CLINICAL SUPPORT (OUTPATIENT)
Dept: INTERNAL MEDICINE | Facility: CLINIC | Age: 57
End: 2022-12-30

## 2022-12-30 DIAGNOSIS — Z23 ENCOUNTER FOR IMMUNIZATION: ICD-10-CM

## 2022-12-30 PROCEDURE — 90715 TDAP VACCINE 7 YRS/> IM: CPT | Performed by: STUDENT IN AN ORGANIZED HEALTH CARE EDUCATION/TRAINING PROGRAM

## 2022-12-30 PROCEDURE — 90677 PCV20 VACCINE IM: CPT | Performed by: STUDENT IN AN ORGANIZED HEALTH CARE EDUCATION/TRAINING PROGRAM

## 2022-12-30 PROCEDURE — 90472 IMMUNIZATION ADMIN EACH ADD: CPT | Performed by: STUDENT IN AN ORGANIZED HEALTH CARE EDUCATION/TRAINING PROGRAM

## 2022-12-30 PROCEDURE — 90686 IIV4 VACC NO PRSV 0.5 ML IM: CPT | Performed by: STUDENT IN AN ORGANIZED HEALTH CARE EDUCATION/TRAINING PROGRAM

## 2022-12-30 PROCEDURE — 90750 HZV VACC RECOMBINANT IM: CPT | Performed by: STUDENT IN AN ORGANIZED HEALTH CARE EDUCATION/TRAINING PROGRAM

## 2022-12-30 PROCEDURE — 90471 IMMUNIZATION ADMIN: CPT | Performed by: STUDENT IN AN ORGANIZED HEALTH CARE EDUCATION/TRAINING PROGRAM

## 2023-01-09 ENCOUNTER — TRANSCRIBE ORDERS (OUTPATIENT)
Dept: ADMINISTRATIVE | Facility: HOSPITAL | Age: 58
End: 2023-01-09
Payer: COMMERCIAL

## 2023-01-09 DIAGNOSIS — Z12.31 VISIT FOR SCREENING MAMMOGRAM: Primary | ICD-10-CM

## 2023-02-22 DIAGNOSIS — G89.29 CHRONIC RIGHT-SIDED LOW BACK PAIN WITH RIGHT-SIDED SCIATICA: ICD-10-CM

## 2023-02-22 DIAGNOSIS — M54.41 CHRONIC RIGHT-SIDED LOW BACK PAIN WITH RIGHT-SIDED SCIATICA: ICD-10-CM

## 2023-02-22 NOTE — TELEPHONE ENCOUNTER
CONTROLLED MEDICATION. PLEASE ADVISE.     Gabapentin 300mg     Last Filled: 12/28/22 #120-2 refills   Last Visit: 12/28/22   Next Appt: not scheduled   Last UDS: 12/28/22  Last Controlled Contract: 4/8/22

## 2023-02-23 RX ORDER — GABAPENTIN 300 MG/1
CAPSULE ORAL
Qty: 120 CAPSULE | Refills: 2 | Status: SHIPPED | OUTPATIENT
Start: 2023-02-23

## 2023-03-10 ENCOUNTER — OFFICE VISIT (OUTPATIENT)
Dept: INTERNAL MEDICINE | Facility: CLINIC | Age: 58
End: 2023-03-10
Payer: COMMERCIAL

## 2023-03-10 VITALS
HEIGHT: 65 IN | OXYGEN SATURATION: 97 % | WEIGHT: 192.2 LBS | TEMPERATURE: 98.2 F | HEART RATE: 78 BPM | DIASTOLIC BLOOD PRESSURE: 79 MMHG | BODY MASS INDEX: 32.02 KG/M2 | SYSTOLIC BLOOD PRESSURE: 122 MMHG

## 2023-03-10 DIAGNOSIS — F17.210 CIGARETTE NICOTINE DEPENDENCE WITHOUT COMPLICATION: ICD-10-CM

## 2023-03-10 DIAGNOSIS — R05.3 PERSISTENT COUGH: ICD-10-CM

## 2023-03-10 DIAGNOSIS — I10 ESSENTIAL HYPERTENSION: ICD-10-CM

## 2023-03-10 DIAGNOSIS — E87.6 HYPOKALEMIA: ICD-10-CM

## 2023-03-10 DIAGNOSIS — J06.9 URI WITH COUGH AND CONGESTION: Primary | ICD-10-CM

## 2023-03-10 LAB
ANION GAP SERPL CALCULATED.3IONS-SCNC: 10 MMOL/L (ref 5–15)
BUN SERPL-MCNC: 20 MG/DL (ref 6–20)
BUN/CREAT SERPL: 25 (ref 7–25)
CALCIUM SPEC-SCNC: 9.1 MG/DL (ref 8.6–10.5)
CHLORIDE SERPL-SCNC: 102 MMOL/L (ref 98–107)
CO2 SERPL-SCNC: 27 MMOL/L (ref 22–29)
CREAT SERPL-MCNC: 0.8 MG/DL (ref 0.57–1)
EGFRCR SERPLBLD CKD-EPI 2021: 86.6 ML/MIN/1.73
EXPIRATION DATE: NORMAL
FLUAV AG UPPER RESP QL IA.RAPID: NOT DETECTED
FLUBV AG UPPER RESP QL IA.RAPID: NOT DETECTED
GLUCOSE SERPL-MCNC: 77 MG/DL (ref 65–99)
INTERNAL CONTROL: NORMAL
Lab: NORMAL
MAGNESIUM SERPL-MCNC: 2.2 MG/DL (ref 1.6–2.6)
POTASSIUM SERPL-SCNC: 3.6 MMOL/L (ref 3.5–5.2)
SARS-COV-2 AG UPPER RESP QL IA.RAPID: NOT DETECTED
SODIUM SERPL-SCNC: 139 MMOL/L (ref 136–145)

## 2023-03-10 PROCEDURE — 87428 SARSCOV & INF VIR A&B AG IA: CPT | Performed by: STUDENT IN AN ORGANIZED HEALTH CARE EDUCATION/TRAINING PROGRAM

## 2023-03-10 PROCEDURE — 83735 ASSAY OF MAGNESIUM: CPT | Performed by: STUDENT IN AN ORGANIZED HEALTH CARE EDUCATION/TRAINING PROGRAM

## 2023-03-10 PROCEDURE — 99214 OFFICE O/P EST MOD 30 MIN: CPT | Performed by: STUDENT IN AN ORGANIZED HEALTH CARE EDUCATION/TRAINING PROGRAM

## 2023-03-10 PROCEDURE — 80048 BASIC METABOLIC PNL TOTAL CA: CPT | Performed by: STUDENT IN AN ORGANIZED HEALTH CARE EDUCATION/TRAINING PROGRAM

## 2023-03-10 PROCEDURE — U0004 COV-19 TEST NON-CDC HGH THRU: HCPCS | Performed by: STUDENT IN AN ORGANIZED HEALTH CARE EDUCATION/TRAINING PROGRAM

## 2023-03-10 RX ORDER — ALBUTEROL SULFATE 90 UG/1
2 AEROSOL, METERED RESPIRATORY (INHALATION) EVERY 4 HOURS PRN
Qty: 18 G | Refills: 2 | Status: SHIPPED | OUTPATIENT
Start: 2023-03-10

## 2023-03-10 RX ORDER — BENZONATATE 100 MG/1
100 CAPSULE ORAL 3 TIMES DAILY PRN
Qty: 60 CAPSULE | Refills: 0 | Status: SHIPPED | OUTPATIENT
Start: 2023-03-10

## 2023-03-10 NOTE — PROGRESS NOTES
"Chief Complaint  Cough (X2 weeks), Nasal Congestion, and Nausea (Dizzy spells)    Subjective          Hope Serena Cuevas presents to [facility]  History of Present Illness    Here for a sick visit.  Here with complaints of dry cough, congestion, nausea.  Diarrhea occasionally, but no vomiting, no fever.  Does report dizzy spells, but no syncope.    Went to urgent care early into illness (2/17/2023), treated with steroids, antibiotics and inhaler.  This alleviated symptoms for a time, but they have returned.        Current Outpatient Medications   Medication Instructions   • albuterol sulfate  (90 Base) MCG/ACT inhaler 2 puffs, Inhalation, Every 4 Hours PRN   • amLODIPine-benazepril (Lotrel) 10-20 MG per capsule 1 capsule, Oral, Daily   • benzonatate (TESSALON PERLES) 100 mg, Oral, 3 Times Daily PRN   • chlorthalidone (HYGROTON) 25 mg, Oral, Daily   • gabapentin (NEURONTIN) 300 MG capsule TAKE 2 CAPSULES BY MOUTH 2 TIMES A DAY.   • potassium chloride (K-DUR,KLOR-CON) 10 MEQ CR tablet 10 mEq, Oral, 2 Times Daily       The following portions of the patient's history were reviewed and updated as appropriate: allergies, current medications, past family history, past medical history, past social history, past surgical history, and problem list.    Objective   Vital Signs:   /79   Pulse 78   Temp 98.2 °F (36.8 °C) (Temporal)   Ht 165.1 cm (65\")   Wt 87.2 kg (192 lb 3.2 oz)   SpO2 97%   BMI 31.98 kg/m²     Wt Readings from Last 3 Encounters:   03/10/23 87.2 kg (192 lb 3.2 oz)   02/17/23 82.6 kg (182 lb 3.2 oz)   12/28/22 84.8 kg (187 lb)     BP Readings from Last 3 Encounters:   03/10/23 122/79   02/17/23 118/81   12/28/22 (!) 220/120     Physical Exam  Vitals reviewed.   Constitutional:       General: She is not in acute distress.     Appearance: Normal appearance. She is not ill-appearing, toxic-appearing or diaphoretic.   HENT:      Head: Normocephalic " and atraumatic.      Right Ear: External ear normal.      Left Ear: External ear normal.   Eyes:      Conjunctiva/sclera: Conjunctivae normal.   Cardiovascular:      Rate and Rhythm: Normal rate and regular rhythm.      Pulses: Normal pulses.      Heart sounds: Normal heart sounds. No murmur heard.    No friction rub. No gallop.   Pulmonary:      Effort: Pulmonary effort is normal. No respiratory distress.      Breath sounds: Normal breath sounds. No stridor. No wheezing, rhonchi or rales.   Chest:      Chest wall: No tenderness.   Abdominal:      General: Abdomen is flat.      Palpations: Abdomen is soft. There is no mass.      Tenderness: There is no abdominal tenderness.   Musculoskeletal:      Right lower leg: No edema.      Left lower leg: No edema.   Skin:     General: Skin is warm and dry.   Neurological:      General: No focal deficit present.      Mental Status: She is alert. Mental status is at baseline.   Psychiatric:         Mood and Affect: Mood normal.         Behavior: Behavior normal.         Thought Content: Thought content normal.         Judgment: Judgment normal.          Result Review :   The following data was reviewed by: Thierno Li MD on 03/10/2023:  Common labs    Common Labs 4/8/22 4/8/22 4/8/22 12/28/22 12/28/22    1236 1236 1236 1650 1650   Glucose  78   82   BUN  13   22 (A)   Creatinine  0.80   0.91   Sodium  138   141   Potassium  3.9   3.4 (A)   Chloride  103   107   Calcium  9.3   9.4   Albumin  4.60   4.5   Total Bilirubin  0.3   0.4   Alkaline Phosphatase  92   106   AST (SGOT)  21   17   ALT (SGPT)  17   20   WBC 6.01       Hemoglobin 13.5       Hematocrit 39.9       Platelets 216       Total Cholesterol   211 (A)     Triglycerides   111     HDL Cholesterol   54     LDL Cholesterol    137 (A)     Uric Acid    4.4    (A) Abnormal value                   Lab Results   Component Value Date    SARSANTIGEN Not Detected 03/10/2023    RAPFLUA Negative 02/17/2023    RAPFLUB Negative  02/17/2023    FLUAAG Not Detected 03/10/2023    FLUBAG Not Detected 03/10/2023    RAPSCRN Negative 02/17/2023    BILIRUBINUR Negative 03/13/2022       Procedures        Assessment and Plan    Diagnoses and all orders for this visit:    1. URI with cough and congestion (Primary)  -     POCT SARS-CoV-2 Antigen CHASE + Flu  -     benzonatate (Tessalon Perles) 100 MG capsule; Take 1 capsule by mouth 3 (Three) Times a Day As Needed for Cough.  Dispense: 60 capsule; Refill: 0  -     COVID-19,APTIMA PANTHER(KAYA),[facility], NP/OP SWAB IN UTM/VTM/SALINE TRANSPORT MEDIA,24 HR TAT - Swab, Nasopharynx    2. Essential hypertension  -     Basic Metabolic Panel    3. Hypokalemia  -     Basic Metabolic Panel  -     Magnesium    4. Persistent cough  -     Full Pulmonary Function Test With Bronchodilator; Future  -     benzonatate (Tessalon Perles) 100 MG capsule; Take 1 capsule by mouth 3 (Three) Times a Day As Needed for Cough.  Dispense: 60 capsule; Refill: 0  -     albuterol sulfate  (90 Base) MCG/ACT inhaler; Inhale 2 puffs Every 4 (Four) Hours As Needed for Wheezing or Shortness of Air.  Dispense: 18 g; Refill: 2    5. Cigarette nicotine dependence without complication  -     Full Pulmonary Function Test With Bronchodilator; Future          There are no discontinued medications.       Follow Up   Return in about 3 months (around 6/10/2023) for LBP.  Patient was given instructions and counseling regarding her condition or for health maintenance advice. Please see specific information pulled into the AVS if appropriate.       Thierno Li MD  03/10/23  18:15 EST

## 2023-03-11 LAB — SARS-COV-2 RNA RESP QL NAA+PROBE: NOT DETECTED

## 2023-03-21 DIAGNOSIS — M54.41 CHRONIC RIGHT-SIDED LOW BACK PAIN WITH RIGHT-SIDED SCIATICA: ICD-10-CM

## 2023-03-21 DIAGNOSIS — G89.29 CHRONIC RIGHT-SIDED LOW BACK PAIN WITH RIGHT-SIDED SCIATICA: ICD-10-CM

## 2023-03-21 RX ORDER — GABAPENTIN 300 MG/1
CAPSULE ORAL
Qty: 120 CAPSULE | Refills: 2 | OUTPATIENT
Start: 2023-03-21

## 2023-06-15 DIAGNOSIS — G89.29 CHRONIC RIGHT-SIDED LOW BACK PAIN WITH RIGHT-SIDED SCIATICA: ICD-10-CM

## 2023-06-15 DIAGNOSIS — M54.41 CHRONIC RIGHT-SIDED LOW BACK PAIN WITH RIGHT-SIDED SCIATICA: ICD-10-CM

## 2023-06-15 RX ORDER — GABAPENTIN 300 MG/1
300 CAPSULE ORAL 4 TIMES DAILY
Qty: 120 CAPSULE | Refills: 0 | Status: SHIPPED | OUTPATIENT
Start: 2023-06-15

## 2023-06-15 NOTE — TELEPHONE ENCOUNTER
Refill request for  controlled substance.      Date of request: 6/15/2023    Medication requested: Gabapentin  Last OV: 3/10/2023  Last UDS: 12/28/2022  Contract signed: yes    Date:4/8/2022 - NEEDS TO BE UPDATED   Next office visit: 6/16/2023    Gloria Block

## 2023-06-16 ENCOUNTER — OFFICE VISIT (OUTPATIENT)
Dept: INTERNAL MEDICINE | Facility: CLINIC | Age: 58
End: 2023-06-16
Payer: COMMERCIAL

## 2023-06-16 VITALS
OXYGEN SATURATION: 97 % | HEIGHT: 65 IN | BODY MASS INDEX: 31.46 KG/M2 | WEIGHT: 188.8 LBS | TEMPERATURE: 97.8 F | SYSTOLIC BLOOD PRESSURE: 143 MMHG | HEART RATE: 82 BPM | DIASTOLIC BLOOD PRESSURE: 86 MMHG

## 2023-06-16 DIAGNOSIS — G47.00 INSOMNIA, UNSPECIFIED TYPE: ICD-10-CM

## 2023-06-16 DIAGNOSIS — Z87.42 HISTORY OF ENDOMETRIOSIS: ICD-10-CM

## 2023-06-16 DIAGNOSIS — R10.31 INTERMITTENT RIGHT LOWER QUADRANT ABDOMINAL PAIN: ICD-10-CM

## 2023-06-16 DIAGNOSIS — M79.89 PAIN AND SWELLING OF TOE OF RIGHT FOOT: ICD-10-CM

## 2023-06-16 DIAGNOSIS — I10 ESSENTIAL HYPERTENSION: ICD-10-CM

## 2023-06-16 DIAGNOSIS — M79.674 PAIN AND SWELLING OF TOE OF RIGHT FOOT: ICD-10-CM

## 2023-06-16 DIAGNOSIS — B35.1 ONYCHOMYCOSIS OF GREAT TOE: ICD-10-CM

## 2023-06-16 DIAGNOSIS — D25.9 UTERINE LEIOMYOMA, UNSPECIFIED LOCATION: ICD-10-CM

## 2023-06-16 DIAGNOSIS — Z12.31 SCREENING MAMMOGRAM FOR BREAST CANCER: ICD-10-CM

## 2023-06-16 DIAGNOSIS — Z23 ENCOUNTER FOR IMMUNIZATION: ICD-10-CM

## 2023-06-16 DIAGNOSIS — M54.41 CHRONIC RIGHT-SIDED LOW BACK PAIN WITH RIGHT-SIDED SCIATICA: ICD-10-CM

## 2023-06-16 DIAGNOSIS — G89.29 CHRONIC RIGHT-SIDED LOW BACK PAIN WITH RIGHT-SIDED SCIATICA: ICD-10-CM

## 2023-06-16 DIAGNOSIS — Z79.899 ON LONG TERM DRUG THERAPY: ICD-10-CM

## 2023-06-16 DIAGNOSIS — Z00.00 ANNUAL PHYSICAL EXAM: Primary | ICD-10-CM

## 2023-06-16 DIAGNOSIS — Z12.11 ENCOUNTER FOR SCREENING FOR MALIGNANT NEOPLASM OF COLON: ICD-10-CM

## 2023-06-16 DIAGNOSIS — Z12.4 ENCOUNTER FOR PAPANICOLAOU SMEAR FOR CERVICAL CANCER SCREENING: ICD-10-CM

## 2023-06-16 LAB
ALBUMIN SERPL-MCNC: 4.3 G/DL (ref 3.5–5.2)
ALBUMIN/GLOB SERPL: 1.4 G/DL
ALP SERPL-CCNC: 84 U/L (ref 39–117)
ALT SERPL W P-5'-P-CCNC: 19 U/L (ref 1–33)
AMPHET+METHAMPHET UR QL: NEGATIVE
AMPHETAMINES UR QL: NEGATIVE
ANION GAP SERPL CALCULATED.3IONS-SCNC: 12.6 MMOL/L (ref 5–15)
AST SERPL-CCNC: 23 U/L (ref 1–32)
BARBITURATES UR QL SCN: NEGATIVE
BASOPHILS # BLD AUTO: 0.07 10*3/MM3 (ref 0–0.2)
BASOPHILS NFR BLD AUTO: 1.3 % (ref 0–1.5)
BENZODIAZ UR QL SCN: NEGATIVE
BILIRUB SERPL-MCNC: 0.4 MG/DL (ref 0–1.2)
BUN SERPL-MCNC: 20 MG/DL (ref 6–20)
BUN/CREAT SERPL: 27.8 (ref 7–25)
BUPRENORPHINE SERPL-MCNC: NEGATIVE NG/ML
CALCIUM SPEC-SCNC: 9.6 MG/DL (ref 8.6–10.5)
CANNABINOIDS SERPL QL: NEGATIVE
CHLORIDE SERPL-SCNC: 108 MMOL/L (ref 98–107)
CHOLEST SERPL-MCNC: 211 MG/DL (ref 0–200)
CO2 SERPL-SCNC: 22.4 MMOL/L (ref 22–29)
COCAINE UR QL: NEGATIVE
CREAT SERPL-MCNC: 0.72 MG/DL (ref 0.57–1)
DEPRECATED RDW RBC AUTO: 41.6 FL (ref 37–54)
EGFRCR SERPLBLD CKD-EPI 2021: 98.3 ML/MIN/1.73
EOSINOPHIL # BLD AUTO: 0.08 10*3/MM3 (ref 0–0.4)
EOSINOPHIL NFR BLD AUTO: 1.5 % (ref 0.3–6.2)
ERYTHROCYTE [DISTWIDTH] IN BLOOD BY AUTOMATED COUNT: 12.7 % (ref 12.3–15.4)
EXPIRATION DATE: NORMAL
GLOBULIN UR ELPH-MCNC: 3 GM/DL
GLUCOSE SERPL-MCNC: 75 MG/DL (ref 65–99)
HCT VFR BLD AUTO: 42.6 % (ref 34–46.6)
HDLC SERPL-MCNC: 52 MG/DL (ref 40–60)
HGB BLD-MCNC: 14 G/DL (ref 12–15.9)
IMM GRANULOCYTES # BLD AUTO: 0.02 10*3/MM3 (ref 0–0.05)
IMM GRANULOCYTES NFR BLD AUTO: 0.4 % (ref 0–0.5)
LDLC SERPL CALC-MCNC: 147 MG/DL (ref 0–100)
LDLC/HDLC SERPL: 2.79 {RATIO}
LYMPHOCYTES # BLD AUTO: 2.33 10*3/MM3 (ref 0.7–3.1)
LYMPHOCYTES NFR BLD AUTO: 44.4 % (ref 19.6–45.3)
Lab: NORMAL
MCH RBC QN AUTO: 29.9 PG (ref 26.6–33)
MCHC RBC AUTO-ENTMCNC: 32.9 G/DL (ref 31.5–35.7)
MCV RBC AUTO: 90.8 FL (ref 79–97)
MDMA UR QL SCN: NEGATIVE
METHADONE UR QL SCN: NEGATIVE
MONOCYTES # BLD AUTO: 0.36 10*3/MM3 (ref 0.1–0.9)
MONOCYTES NFR BLD AUTO: 6.9 % (ref 5–12)
NEUTROPHILS NFR BLD AUTO: 2.39 10*3/MM3 (ref 1.7–7)
NEUTROPHILS NFR BLD AUTO: 45.5 % (ref 42.7–76)
NRBC BLD AUTO-RTO: 0 /100 WBC (ref 0–0.2)
OPIATES UR QL: NEGATIVE
OXYCODONE UR QL SCN: NEGATIVE
PCP UR QL SCN: NEGATIVE
PLATELET # BLD AUTO: 211 10*3/MM3 (ref 140–450)
PMV BLD AUTO: 12.3 FL (ref 6–12)
POTASSIUM SERPL-SCNC: 3.5 MMOL/L (ref 3.5–5.2)
PROT SERPL-MCNC: 7.3 G/DL (ref 6–8.5)
RBC # BLD AUTO: 4.69 10*6/MM3 (ref 3.77–5.28)
SODIUM SERPL-SCNC: 143 MMOL/L (ref 136–145)
TRIGL SERPL-MCNC: 70 MG/DL (ref 0–150)
TSH SERPL DL<=0.05 MIU/L-ACNC: 1.19 UIU/ML (ref 0.27–4.2)
VLDLC SERPL-MCNC: 12 MG/DL (ref 5–40)
WBC NRBC COR # BLD: 5.25 10*3/MM3 (ref 3.4–10.8)

## 2023-06-16 PROCEDURE — 80050 GENERAL HEALTH PANEL: CPT

## 2023-06-16 PROCEDURE — 80061 LIPID PANEL: CPT

## 2023-06-16 RX ORDER — TRAZODONE HYDROCHLORIDE 50 MG/1
50 TABLET ORAL NIGHTLY
Qty: 90 TABLET | Refills: 1 | Status: SHIPPED | OUTPATIENT
Start: 2023-06-16

## 2023-06-16 NOTE — PROGRESS NOTES
"Chief Complaint  Annual Exam    Subjective      Vita Cuevas presents to [facility]  History of Present Illness    Vita is here for her annual exam.     Last mammo: ordered on Jan 2023 - due; needing a new referral  Last pap: Due (been a very long time); history of endometriosis with uterine adenoma  Last colonoscopy: Last ordered December 2022 - due; needing a new referral  Last labs: March 2023  Specialists: none     Right foot pain -  Right pinky toe is painful - wants to see podiatry   Has a nodule on the outer aspect   Has been there for a few months   Thought it might have been gout but uric acid level normal    Right lower side pain intermittent x many months.   Painful to stand   Some days its not there, other days its a sharp pain  Hx of 3 c-sections   Did have a history of endometriosis   Menopause at 48 (LMP was then)  Sometimes feels like she can't empty her bladder completely with one voiding attempt, however no UTI symptoms    Smokes 0.5 ppd    Current Outpatient Medications   Medication Instructions    albuterol sulfate  (90 Base) MCG/ACT inhaler 2 puffs, Inhalation, Every 4 Hours PRN    amLODIPine-benazepril (Lotrel) 10-20 MG per capsule 1 capsule, Oral, Daily    chlorthalidone (HYGROTON) 25 mg, Oral, Daily    gabapentin (NEURONTIN) 300 mg, Oral, 4 Times Daily    traZODone (DESYREL) 50 mg, Oral, Nightly     The following portions of the patient's history were reviewed and updated as appropriate: allergies, current medications, past family history, past medical history, past social history, past surgical history, and problem list.    Objective   Vital Signs:   /86 (BP Location: Left arm, Patient Position: Sitting)   Pulse 82   Temp 97.8 °F (36.6 °C) (Temporal)   Ht 165.1 cm (65\")   Wt 85.6 kg (188 lb 12.8 oz)   SpO2 97%   BMI 31.42 kg/m²     Wt Readings from Last 3 Encounters:   06/16/23 85.6 kg (188 lb 12.8 oz)   03/10/23 87.2 " kg (192 lb 3.2 oz)   02/17/23 82.6 kg (182 lb 3.2 oz)     BP Readings from Last 3 Encounters:   06/16/23 143/86   03/10/23 122/79   02/17/23 118/81     Physical Exam  Vitals reviewed.   Constitutional:       Appearance: Normal appearance. She is well-developed.   HENT:      Head: Normocephalic and atraumatic.      Mouth/Throat:      Pharynx: No oropharyngeal exudate.   Eyes:      Conjunctiva/sclera: Conjunctivae normal.      Pupils: Pupils are equal, round, and reactive to light.   Cardiovascular:      Rate and Rhythm: Normal rate and regular rhythm.      Heart sounds: No murmur heard.    No friction rub. No gallop.   Pulmonary:      Effort: Pulmonary effort is normal.      Breath sounds: Normal breath sounds. No wheezing or rhonchi.   Abdominal:      General: Bowel sounds are normal.      Palpations: Abdomen is soft.      Tenderness: There is abdominal tenderness (intermittent) in the right lower quadrant. There is no guarding or rebound.   Skin:     General: Skin is warm and dry.   Neurological:      Mental Status: She is alert and oriented to person, place, and time.   Psychiatric:         Mood and Affect: Affect normal.        Result Review :  The following data was reviewed by: TIFFANY Farnsworth on 06/16/2023:      Common labs          12/28/2022    16:50 3/10/2023    12:24   Common Labs   Glucose 82  77    BUN 22  20    Creatinine 0.91  0.80    Sodium 141  139    Potassium 3.4  3.6    Chloride 107  102    Calcium 9.4  9.1    Albumin 4.5     Total Bilirubin 0.4     Alkaline Phosphatase 106     AST (SGOT) 17     ALT (SGPT) 20     Uric Acid 4.4         Lab Results (last 72 hours)       Procedure Component Value Units Date/Time    POC Urine Drug Screen [facility] [[id number]]  (Normal) Collected: 06/16/23 0847    Specimen: Urine Updated: 06/16/23 0847     Amphetamine Screen, Urine Negative     Barbiturates Screen, Urine Negative     Buprenorphine, Screen, Urine Negative     Benzodiazepine Screen, Urine  Negative     Cocaine Screen, Urine Negative     MDMA (ECSTASY) Negative     Methamphetamine, Ur Negative     Methadone Screen, Urine Negative     Opiate Screen Negative     Oxycodone Screen, Urine Negative     Phencyclidine (PCP), Urine Negative     THC, Screen, Urine Negative     Lot Number [id number]     Expiration Date 3/31/24    Comprehensive Metabolic Panel [[id number]] Collected: 06/16/23 0946    Specimen: Blood from Arm, Right Updated: 06/16/23 0946    CBC & Differential [[id number]] Collected: 06/16/23 0946    Specimen: Blood from Arm, Right Updated: 06/16/23 0946    Narrative:      The following orders were created for panel order CBC & Differential.  Procedure                               Abnormality         Status                     ---------                               -----------         ------                     CBC Auto Differential[[id number]]                            In process                   Please view results for these tests on the individual orders.    TSH [[id number]] Collected: 06/16/23 0946    Specimen: Blood from Arm, Right Updated: 06/16/23 0946    Lipid Panel [[id number]] Collected: 06/16/23 0946    Specimen: Blood from Arm, Right Updated: 06/16/23 0946    CBC Auto Differential [[id number]] Collected: 06/16/23 0946    Specimen: Blood from Arm, Right Updated: 06/16/23 0946             No Images in the past 120 days found..    Lab Results   Component Value Date    SARSANTIGEN Not Detected 03/10/2023    COVID19 Not Detected 03/10/2023    RAPFLUA Negative 02/17/2023    RAPFLUB Negative 02/17/2023    FLUAAG Not Detected 03/10/2023    FLUBAG Not Detected 03/10/2023    RAPSCRN Negative 02/17/2023    BILIRUBINUR Negative 03/13/2022       Procedures        Assessment and Plan   Diagnoses and all orders for this visit:    1. Annual physical exam (Primary)  Assessment & Plan:  Growing and developing well  Age appropriate anticipatory guidance regarding growth, development, nutrition, vaccination,  and safety discussed and handout given to caregiver.      Orders:  -     Comprehensive Metabolic Panel  -     CBC & Differential  -     TSH  -     Lipid Panel    2. Essential hypertension  Comments:  Elevated in office today.  Patient has not taken medication yet today.  Orders:  -     Comprehensive Metabolic Panel    3. Encounter for screening for malignant neoplasm of colon  Comments:  Referral for screening colonoscopy ordered.  Orders:  -     Ambulatory Referral For Screening Colonoscopy    4. Encounter for immunization  Comments:  Second Shingrix vaccine given today.  Discussed side effects  Orders:  -     Shingrix Vaccine    5. On long term drug therapy  Comments:  UDS updated.  Orders:  -     POC Urine Drug Screen [facility]-Cup    6. Pain and swelling of toe of right foot  Comments:  Unsure of etiology.  X-ray obtained.  Podiatry referral placed  Orders:  -     XR Foot 3+ View Right; Future  -     Ambulatory Referral to Podiatry    7. Chronic right-sided low back pain with right-sided sciatica  Comments:  Patient currently on gabapentin.  UDS updated    8. Insomnia, unspecified type  Comments:  We will try trazodone.  Discussed side effects.  Follow-up in 4 weeks  Orders:  -     traZODone (DESYREL) 50 MG tablet; Take 1 tablet by mouth Every Night.  Dispense: 90 tablet; Refill: 1    9. Screening mammogram for breast cancer  Comments:  Screening mammogram order placed  Orders:  -     Mammo Screening Digital Tomosynthesis Bilateral With CAD; Future    10. Onychomycosis of great toe  Comments:  Podiatry referral placed  Orders:  -     Ambulatory Referral to Podiatry    11. Encounter for Papanicolaou smear for cervical cancer screening  Comments:  Gynecology referral placed.  Orders:  -     Ambulatory Referral to Gynecology    12. Uterine leiomyoma, unspecified location  Comments:  History of  Orders:  -     Ambulatory Referral to Gynecology    13. History of endometriosis  -     Ambulatory Referral to  Gynecology    14. Intermittent right lower quadrant abdominal pain  Comments:  No concerns for acute abdomen. Discussed worsening symptoms and possible differential diagnosis. Transvaginal ultrasound ordered and OB/GYN referral placed  Orders:  -     US Non-ob Transvaginal; Future  -     Ambulatory Referral to Gynecology          Medications Discontinued During This Encounter   Medication Reason    benzonatate (Tessalon Perles) 100 MG capsule *Therapy completed    potassium chloride (K-DUR,KLOR-CON) 10 MEQ CR tablet Historical Med - Therapy completed          Follow Up   Return in about 1 month (around 7/16/2023).  Patient was given instructions and counseling regarding her condition or for health maintenance advice. Please see specific information pulled into the AVS if appropriate.       TIFFANY Farnsworth  06/16/23  13:08 EDT

## 2023-06-17 ENCOUNTER — PATIENT MESSAGE (OUTPATIENT)
Dept: INTERNAL MEDICINE | Facility: CLINIC | Age: 58
End: 2023-06-17
Payer: COMMERCIAL

## 2023-06-19 NOTE — TELEPHONE ENCOUNTER
From: Vita Cuevas  To: Breann Bourgeois  Sent: 6/17/2023 8:23 AM EDT  Subject: Question regarding COMPREHENSIVE METABOLIC PANEL    I have no idea what any of this means???? Can you explain. I see a few levels are in the yellow not sure what it all means? Please and thank you.

## 2023-07-17 ENCOUNTER — APPOINTMENT (OUTPATIENT)
Dept: CT IMAGING | Facility: HOSPITAL | Age: 58
End: 2023-07-17
Payer: COMMERCIAL

## 2023-07-17 ENCOUNTER — APPOINTMENT (OUTPATIENT)
Dept: MRI IMAGING | Facility: HOSPITAL | Age: 58
End: 2023-07-17
Payer: COMMERCIAL

## 2023-07-17 ENCOUNTER — APPOINTMENT (OUTPATIENT)
Dept: GENERAL RADIOLOGY | Facility: HOSPITAL | Age: 58
End: 2023-07-17
Payer: COMMERCIAL

## 2023-07-17 ENCOUNTER — HOSPITAL ENCOUNTER (OUTPATIENT)
Facility: HOSPITAL | Age: 58
Discharge: HOME | End: 2023-07-19
Attending: EMERGENCY MEDICINE | Admitting: EMERGENCY MEDICINE
Payer: COMMERCIAL

## 2023-07-17 DIAGNOSIS — Z90.49 S/P LAPAROSCOPIC CHOLECYSTECTOMY: ICD-10-CM

## 2023-07-17 DIAGNOSIS — K80.42 CHOLEDOCHOLITHIASIS WITH ACUTE CHOLECYSTITIS: Primary | ICD-10-CM

## 2023-07-17 PROBLEM — K81.0 ACUTE CHOLECYSTITIS: Status: ACTIVE | Noted: 2023-07-17

## 2023-07-17 PROBLEM — K80.20 CALCULUS OF GALLBLADDER: Status: ACTIVE | Noted: 2023-07-17

## 2023-07-17 LAB
ALBUMIN SERPL-MCNC: 4.4 G/DL (ref 3.5–5.2)
ALBUMIN/GLOB SERPL: 1.6 G/DL
ALP SERPL-CCNC: 87 U/L (ref 39–117)
ALT SERPL W P-5'-P-CCNC: 16 U/L (ref 1–33)
ANION GAP SERPL CALCULATED.3IONS-SCNC: 11.4 MMOL/L (ref 5–15)
AST SERPL-CCNC: 18 U/L (ref 1–32)
BASOPHILS # BLD AUTO: 0.07 10*3/MM3 (ref 0–0.2)
BASOPHILS NFR BLD AUTO: 0.8 % (ref 0–1.5)
BILIRUB SERPL-MCNC: 0.3 MG/DL (ref 0–1.2)
BILIRUB UR QL STRIP: NEGATIVE
BUN SERPL-MCNC: 14 MG/DL (ref 6–20)
BUN/CREAT SERPL: 17.5 (ref 7–25)
CALCIUM SPEC-SCNC: 9.8 MG/DL (ref 8.6–10.5)
CHLORIDE SERPL-SCNC: 107 MMOL/L (ref 98–107)
CLARITY UR: CLEAR
CO2 SERPL-SCNC: 24.6 MMOL/L (ref 22–29)
COLOR UR: YELLOW
CREAT SERPL-MCNC: 0.8 MG/DL (ref 0.57–1)
D-LACTATE SERPL-SCNC: 1.8 MMOL/L (ref 0.5–2)
DEPRECATED RDW RBC AUTO: 41.7 FL (ref 37–54)
EGFRCR SERPLBLD CKD-EPI 2021: 86.6 ML/MIN/1.73
EOSINOPHIL # BLD AUTO: 0.27 10*3/MM3 (ref 0–0.4)
EOSINOPHIL NFR BLD AUTO: 3.2 % (ref 0.3–6.2)
ERYTHROCYTE [DISTWIDTH] IN BLOOD BY AUTOMATED COUNT: 12.8 % (ref 12.3–15.4)
GLOBULIN UR ELPH-MCNC: 2.8 GM/DL
GLUCOSE SERPL-MCNC: 120 MG/DL (ref 65–99)
GLUCOSE UR STRIP-MCNC: NEGATIVE MG/DL
HCT VFR BLD AUTO: 40.7 % (ref 34–46.6)
HGB BLD-MCNC: 13.9 G/DL (ref 12–15.9)
HGB UR QL STRIP.AUTO: NEGATIVE
HOLD SPECIMEN: NORMAL
HOLD SPECIMEN: NORMAL
IMM GRANULOCYTES # BLD AUTO: 0.02 10*3/MM3 (ref 0–0.05)
IMM GRANULOCYTES NFR BLD AUTO: 0.2 % (ref 0–0.5)
KETONES UR QL STRIP: NEGATIVE
LEUKOCYTE ESTERASE UR QL STRIP.AUTO: NEGATIVE
LIPASE SERPL-CCNC: 27 U/L (ref 13–60)
LYMPHOCYTES # BLD AUTO: 4.99 10*3/MM3 (ref 0.7–3.1)
LYMPHOCYTES NFR BLD AUTO: 58.8 % (ref 19.6–45.3)
MCH RBC QN AUTO: 30.3 PG (ref 26.6–33)
MCHC RBC AUTO-ENTMCNC: 34.2 G/DL (ref 31.5–35.7)
MCV RBC AUTO: 88.9 FL (ref 79–97)
MONOCYTES # BLD AUTO: 0.56 10*3/MM3 (ref 0.1–0.9)
MONOCYTES NFR BLD AUTO: 6.6 % (ref 5–12)
NEUTROPHILS NFR BLD AUTO: 2.58 10*3/MM3 (ref 1.7–7)
NEUTROPHILS NFR BLD AUTO: 30.4 % (ref 42.7–76)
NITRITE UR QL STRIP: NEGATIVE
NRBC BLD AUTO-RTO: 0 /100 WBC (ref 0–0.2)
PH UR STRIP.AUTO: 5.5 [PH] (ref 5–8)
PLATELET # BLD AUTO: 224 10*3/MM3 (ref 140–450)
PMV BLD AUTO: 11.4 FL (ref 6–12)
POTASSIUM SERPL-SCNC: 3.5 MMOL/L (ref 3.5–5.2)
PROT SERPL-MCNC: 7.2 G/DL (ref 6–8.5)
PROT UR QL STRIP: NEGATIVE
RBC # BLD AUTO: 4.58 10*6/MM3 (ref 3.77–5.28)
SODIUM SERPL-SCNC: 143 MMOL/L (ref 136–145)
SP GR UR STRIP: 1.02 (ref 1–1.03)
UROBILINOGEN UR QL STRIP: NORMAL
WBC NRBC COR # BLD: 8.49 10*3/MM3 (ref 3.4–10.8)
WHOLE BLOOD HOLD COAG: NORMAL
WHOLE BLOOD HOLD SPECIMEN: NORMAL

## 2023-07-17 PROCEDURE — 25010000002 PIPERACILLIN SOD-TAZOBACTAM PER 1 G: Performed by: EMERGENCY MEDICINE

## 2023-07-17 PROCEDURE — 25010000002 PIPERACILLIN SOD-TAZOBACTAM PER 1 G: Performed by: SURGERY

## 2023-07-17 PROCEDURE — 36415 COLL VENOUS BLD VENIPUNCTURE: CPT

## 2023-07-17 PROCEDURE — 47563 LAPARO CHOLECYSTECTOMY/GRAPH: CPT | Performed by: SURGERY

## 2023-07-17 PROCEDURE — 99204 OFFICE O/P NEW MOD 45 MIN: CPT | Performed by: INTERNAL MEDICINE

## 2023-07-17 PROCEDURE — 94761 N-INVAS EAR/PLS OXIMETRY MLT: CPT

## 2023-07-17 PROCEDURE — 94799 UNLISTED PULMONARY SVC/PX: CPT

## 2023-07-17 PROCEDURE — 76000 FLUOROSCOPY <1 HR PHYS/QHP: CPT

## 2023-07-17 PROCEDURE — G0378 HOSPITAL OBSERVATION PER HR: HCPCS

## 2023-07-17 PROCEDURE — 25010000002 MORPHINE PER 10 MG: Performed by: INTERNAL MEDICINE

## 2023-07-17 PROCEDURE — 25010000002 HYDROMORPHONE 1 MG/ML SOLUTION: Performed by: NURSE ANESTHETIST, CERTIFIED REGISTERED

## 2023-07-17 PROCEDURE — 96374 THER/PROPH/DIAG INJ IV PUSH: CPT

## 2023-07-17 PROCEDURE — 25010000002 DICYCLOMINE PER 20 MG: Performed by: NURSE PRACTITIONER

## 2023-07-17 PROCEDURE — 88304 TISSUE EXAM BY PATHOLOGIST: CPT | Performed by: SURGERY

## 2023-07-17 PROCEDURE — 83605 ASSAY OF LACTIC ACID: CPT

## 2023-07-17 PROCEDURE — 99285 EMERGENCY DEPT VISIT HI MDM: CPT

## 2023-07-17 PROCEDURE — 74181 MRI ABDOMEN W/O CONTRAST: CPT

## 2023-07-17 PROCEDURE — 25010000002 ONDANSETRON PER 1 MG: Performed by: NURSE PRACTITIONER

## 2023-07-17 PROCEDURE — 25010000002 INDOCYANINE GREEN 25 MG RECONSTITUTED SOLUTION: Performed by: INTERNAL MEDICINE

## 2023-07-17 PROCEDURE — 87040 BLOOD CULTURE FOR BACTERIA: CPT | Performed by: INTERNAL MEDICINE

## 2023-07-17 PROCEDURE — 25010000002 MORPHINE PER 10 MG: Performed by: NURSE PRACTITIONER

## 2023-07-17 PROCEDURE — 25510000001 IOPAMIDOL PER 1 ML: Performed by: SURGERY

## 2023-07-17 PROCEDURE — 85025 COMPLETE CBC W/AUTO DIFF WBC: CPT

## 2023-07-17 PROCEDURE — 80053 COMPREHEN METABOLIC PANEL: CPT

## 2023-07-17 PROCEDURE — 74177 CT ABD & PELVIS W/CONTRAST: CPT

## 2023-07-17 PROCEDURE — 25010000002 FENTANYL CITRATE (PF) 50 MCG/ML SOLUTION: Performed by: EMERGENCY MEDICINE

## 2023-07-17 PROCEDURE — 25010000002 LORAZEPAM PER 2 MG: Performed by: EMERGENCY MEDICINE

## 2023-07-17 PROCEDURE — 36415 COLL VENOUS BLD VENIPUNCTURE: CPT | Performed by: INTERNAL MEDICINE

## 2023-07-17 PROCEDURE — 25010000002 MORPHINE PER 10 MG: Performed by: SURGERY

## 2023-07-17 PROCEDURE — 25010000002 PIPERACILLIN SOD-TAZOBACTAM PER 1 G: Performed by: FAMILY MEDICINE

## 2023-07-17 PROCEDURE — 25010000002 ONDANSETRON PER 1 MG: Performed by: SURGERY

## 2023-07-17 PROCEDURE — 96375 TX/PRO/DX INJ NEW DRUG ADDON: CPT

## 2023-07-17 PROCEDURE — 25010000002 ONDANSETRON PER 1 MG: Performed by: INTERNAL MEDICINE

## 2023-07-17 PROCEDURE — 71045 X-RAY EXAM CHEST 1 VIEW: CPT

## 2023-07-17 PROCEDURE — 25510000001 IOPAMIDOL PER 1 ML: Performed by: REGISTERED NURSE

## 2023-07-17 PROCEDURE — 96376 TX/PRO/DX INJ SAME DRUG ADON: CPT

## 2023-07-17 PROCEDURE — 81003 URINALYSIS AUTO W/O SCOPE: CPT

## 2023-07-17 PROCEDURE — 96372 THER/PROPH/DIAG INJ SC/IM: CPT

## 2023-07-17 PROCEDURE — S0260 H&P FOR SURGERY: HCPCS | Performed by: NURSE PRACTITIONER

## 2023-07-17 PROCEDURE — 83690 ASSAY OF LIPASE: CPT

## 2023-07-17 PROCEDURE — 25010000002 MIDAZOLAM PER 1MG: Performed by: ANESTHESIOLOGY

## 2023-07-17 DEVICE — LIGACLIP 10-M/L, 10MM ENDOSCOPIC ROTATING MULTIPLE CLIP APPLIERS
Type: IMPLANTABLE DEVICE | Site: ABDOMEN | Status: FUNCTIONAL
Brand: LIGACLIP

## 2023-07-17 RX ORDER — DICYCLOMINE HYDROCHLORIDE 10 MG/ML
20 INJECTION INTRAMUSCULAR ONCE
Status: COMPLETED | OUTPATIENT
Start: 2023-07-17 | End: 2023-07-17

## 2023-07-17 RX ORDER — SODIUM CHLORIDE 0.9 % (FLUSH) 0.9 %
10 SYRINGE (ML) INJECTION EVERY 12 HOURS SCHEDULED
Status: DISCONTINUED | OUTPATIENT
Start: 2023-07-17 | End: 2023-07-19 | Stop reason: HOSPADM

## 2023-07-17 RX ORDER — MAGNESIUM HYDROXIDE 1200 MG/15ML
LIQUID ORAL AS NEEDED
Status: DISCONTINUED | OUTPATIENT
Start: 2023-07-17 | End: 2023-07-17 | Stop reason: HOSPADM

## 2023-07-17 RX ORDER — FAMOTIDINE 10 MG/ML
20 INJECTION, SOLUTION INTRAVENOUS ONCE
Status: COMPLETED | OUTPATIENT
Start: 2023-07-17 | End: 2023-07-17

## 2023-07-17 RX ORDER — SODIUM CHLORIDE, SODIUM LACTATE, POTASSIUM CHLORIDE, CALCIUM CHLORIDE 600; 310; 30; 20 MG/100ML; MG/100ML; MG/100ML; MG/100ML
100 INJECTION, SOLUTION INTRAVENOUS CONTINUOUS
Status: DISCONTINUED | OUTPATIENT
Start: 2023-07-17 | End: 2023-07-19

## 2023-07-17 RX ORDER — MIDAZOLAM HYDROCHLORIDE 2 MG/2ML
2 INJECTION, SOLUTION INTRAMUSCULAR; INTRAVENOUS ONCE
Status: COMPLETED | OUTPATIENT
Start: 2023-07-17 | End: 2023-07-17

## 2023-07-17 RX ORDER — HYDROMORPHONE HYDROCHLORIDE 2 MG/1
2 TABLET ORAL
Status: DISCONTINUED | OUTPATIENT
Start: 2023-07-17 | End: 2023-07-17 | Stop reason: HOSPADM

## 2023-07-17 RX ORDER — SODIUM CHLORIDE 0.9 % (FLUSH) 0.9 %
10 SYRINGE (ML) INJECTION AS NEEDED
Status: DISCONTINUED | OUTPATIENT
Start: 2023-07-17 | End: 2023-07-19 | Stop reason: HOSPADM

## 2023-07-17 RX ORDER — SODIUM CHLORIDE 9 MG/ML
40 INJECTION, SOLUTION INTRAVENOUS AS NEEDED
Status: DISCONTINUED | OUTPATIENT
Start: 2023-07-17 | End: 2023-07-19 | Stop reason: HOSPADM

## 2023-07-17 RX ORDER — ONDANSETRON 2 MG/ML
4 INJECTION INTRAMUSCULAR; INTRAVENOUS EVERY 6 HOURS PRN
Status: DISCONTINUED | OUTPATIENT
Start: 2023-07-17 | End: 2023-07-19 | Stop reason: HOSPADM

## 2023-07-17 RX ORDER — SODIUM CHLORIDE, SODIUM LACTATE, POTASSIUM CHLORIDE, CALCIUM CHLORIDE 600; 310; 30; 20 MG/100ML; MG/100ML; MG/100ML; MG/100ML
30 INJECTION, SOLUTION INTRAVENOUS CONTINUOUS
Status: DISCONTINUED | OUTPATIENT
Start: 2023-07-17 | End: 2023-07-18

## 2023-07-17 RX ORDER — BUPIVACAINE HYDROCHLORIDE AND EPINEPHRINE 2.5; 5 MG/ML; UG/ML
INJECTION, SOLUTION EPIDURAL; INFILTRATION; INTRACAUDAL; PERINEURAL AS NEEDED
Status: DISCONTINUED | OUTPATIENT
Start: 2023-07-17 | End: 2023-07-17 | Stop reason: HOSPADM

## 2023-07-17 RX ORDER — NALOXONE HCL 0.4 MG/ML
0.4 VIAL (ML) INJECTION
Status: DISCONTINUED | OUTPATIENT
Start: 2023-07-17 | End: 2023-07-19 | Stop reason: HOSPADM

## 2023-07-17 RX ORDER — PROMETHAZINE HYDROCHLORIDE 12.5 MG/1
25 TABLET ORAL ONCE AS NEEDED
Status: DISCONTINUED | OUTPATIENT
Start: 2023-07-17 | End: 2023-07-17 | Stop reason: HOSPADM

## 2023-07-17 RX ORDER — ONDANSETRON 2 MG/ML
4 INJECTION INTRAMUSCULAR; INTRAVENOUS ONCE AS NEEDED
Status: DISCONTINUED | OUTPATIENT
Start: 2023-07-17 | End: 2023-07-17 | Stop reason: HOSPADM

## 2023-07-17 RX ORDER — ONDANSETRON 2 MG/ML
4 INJECTION INTRAMUSCULAR; INTRAVENOUS ONCE
Status: COMPLETED | OUTPATIENT
Start: 2023-07-17 | End: 2023-07-17

## 2023-07-17 RX ORDER — INDOCYANINE GREEN AND WATER 25 MG
2.5 KIT INJECTION
Status: COMPLETED | OUTPATIENT
Start: 2023-07-17 | End: 2023-07-17

## 2023-07-17 RX ORDER — FENTANYL CITRATE 50 UG/ML
50 INJECTION, SOLUTION INTRAMUSCULAR; INTRAVENOUS ONCE
Status: COMPLETED | OUTPATIENT
Start: 2023-07-17 | End: 2023-07-17

## 2023-07-17 RX ORDER — ENOXAPARIN SODIUM 100 MG/ML
40 INJECTION SUBCUTANEOUS DAILY
Status: DISCONTINUED | OUTPATIENT
Start: 2023-07-18 | End: 2023-07-18

## 2023-07-17 RX ORDER — MEPERIDINE HYDROCHLORIDE 25 MG/ML
12.5 INJECTION INTRAMUSCULAR; INTRAVENOUS; SUBCUTANEOUS
Status: DISCONTINUED | OUTPATIENT
Start: 2023-07-17 | End: 2023-07-17 | Stop reason: HOSPADM

## 2023-07-17 RX ORDER — LORAZEPAM 2 MG/ML
1 INJECTION INTRAMUSCULAR ONCE
Status: COMPLETED | OUTPATIENT
Start: 2023-07-17 | End: 2023-07-17

## 2023-07-17 RX ORDER — PROMETHAZINE HYDROCHLORIDE 25 MG/1
25 SUPPOSITORY RECTAL ONCE AS NEEDED
Status: DISCONTINUED | OUTPATIENT
Start: 2023-07-17 | End: 2023-07-17 | Stop reason: HOSPADM

## 2023-07-17 RX ADMIN — ONDANSETRON 4 MG: 2 INJECTION INTRAMUSCULAR; INTRAVENOUS at 08:58

## 2023-07-17 RX ADMIN — MIDAZOLAM HYDROCHLORIDE 2 MG: 1 INJECTION, SOLUTION INTRAMUSCULAR; INTRAVENOUS at 16:36

## 2023-07-17 RX ADMIN — PIPERACILLIN AND TAZOBACTAM 3.38 G: 3; .375 INJECTION, POWDER, LYOPHILIZED, FOR SOLUTION INTRAVENOUS at 22:28

## 2023-07-17 RX ADMIN — LORAZEPAM 1 MG: 2 INJECTION INTRAMUSCULAR; INTRAVENOUS at 10:29

## 2023-07-17 RX ADMIN — MORPHINE SULFATE 4 MG: 4 INJECTION, SOLUTION INTRAMUSCULAR; INTRAVENOUS at 08:59

## 2023-07-17 RX ADMIN — SODIUM CHLORIDE 1000 ML: 9 INJECTION, SOLUTION INTRAVENOUS at 09:55

## 2023-07-17 RX ADMIN — ONDANSETRON 4 MG: 2 INJECTION INTRAMUSCULAR; INTRAVENOUS at 14:52

## 2023-07-17 RX ADMIN — FENTANYL CITRATE 50 MCG: 50 INJECTION, SOLUTION INTRAMUSCULAR; INTRAVENOUS at 09:55

## 2023-07-17 RX ADMIN — INDOCYANINE GREEN AND WATER 2.5 MG: KIT at 16:12

## 2023-07-17 RX ADMIN — PIPERACILLIN AND TAZOBACTAM 3.38 G: 3; .375 INJECTION, POWDER, LYOPHILIZED, FOR SOLUTION INTRAVENOUS at 14:38

## 2023-07-17 RX ADMIN — IOPAMIDOL 100 ML: 755 INJECTION, SOLUTION INTRAVENOUS at 08:40

## 2023-07-17 RX ADMIN — DICYCLOMINE HYDROCHLORIDE 20 MG: 20 INJECTION, SOLUTION INTRAMUSCULAR at 08:55

## 2023-07-17 RX ADMIN — FAMOTIDINE 20 MG: 10 INJECTION INTRAVENOUS at 09:01

## 2023-07-17 RX ADMIN — MORPHINE SULFATE 4 MG: 4 INJECTION, SOLUTION INTRAMUSCULAR; INTRAVENOUS at 19:54

## 2023-07-17 RX ADMIN — MORPHINE SULFATE 4 MG: 4 INJECTION, SOLUTION INTRAMUSCULAR; INTRAVENOUS at 14:52

## 2023-07-17 RX ADMIN — SODIUM CHLORIDE, POTASSIUM CHLORIDE, SODIUM LACTATE AND CALCIUM CHLORIDE 100 ML/HR: 600; 310; 30; 20 INJECTION, SOLUTION INTRAVENOUS at 15:54

## 2023-07-17 RX ADMIN — HYDROMORPHONE HYDROCHLORIDE 0.5 MG: 1 INJECTION, SOLUTION INTRAMUSCULAR; INTRAVENOUS; SUBCUTANEOUS at 19:05

## 2023-07-17 NOTE — OP NOTE
CHOLECYSTECTOMY LAPAROSCOPIC POSSIBLE OPEN CHOLECYSTECTOMY  Procedure Report    Patient Name:  Vita Cuevas  YOB: 1965    Date of Surgery:  7/17/2023     Indications:  Biliary colic    Pre-op Diagnosis:   Choledocholithiasis with acute cholecystitis [K80.42]       Post-Op Diagnosis Codes:     * Choledocholithiasis with acute cholecystitis [K80.42]    Procedure/CPT® Codes:      Procedure(s):  CHOLECYSTECTOMY LAPAROSCOPIC, INTRAOPERATIVE CHOLANGIOGRAM    Staff:  Surgeon(s):  Marbella Razo MD    Assistant: Nani Rothman RN    Anesthesia: General    Estimated Blood Loss: 10 mL    Implants:    Implant Name Type Inv. Item Serial No.  Lot No. LRB No. Used Action   CLIPAPPLR M/ ENDO LIGACLIP ROT 10MM MD/THOMAS - [id number] Implant CLIPAPPLR M/ ENDO LIGACLIP ROT 10MM MD/THOMAS  [employer]  DIV OF J AND J [id number] N/A 1 Implanted       Specimen:          Specimens       ID Source Type Tests Collected By Collected At Frozen?    A Gallbladder Tissue TISSUE PATHOLOGY EXAM   Marbella Razo MD 7/17/23 6891 No    Description: gallbladder and contents                Findings: none    Complications: none    Description of Procedure:   After informed consent was obtained the patient was taken to the operating room placed supine on the table. The patient was prepped and draped in normal sterile fashion. A veress needle was inserted into the left upper quadrant in standard fashion and the abdomen was insufflated without complication. We began by inserting a 5 mm periumbilical optiview trocar under direct visualization. The abdomen was insufflated, and an epigastric 10/12 mm port as well as 2 RUQ 5 mm ports were placed under direct visualization. The gallbladder was grasped and retracted cephalad and the omental attachments were bluntly dissected off. The infundibulum was grasped and retracted laterally. At this point in time I used [place] dissector to dissect out the cystic artery  and cystic duct. A Clay clamp was used to perform cholangiogram in standard fashion and the biliary system was evaluated.  There was no obvious filling of the small bowel and there appeared to be a common bile duct blockage.  The remainder of the biliary tree did fill including the right and left hepatic ducts.  The critical view was obtained and we were able to see the liver between the cystic duct and cystic artery. IC Green and SPY technology were used to evaluate the cystic and common bile ducts. B Both the cystic duct and cystic artery were both clipped twice proximally and once distally. They were ligated leaving two clips on both the cystic duct and cystic artery stumps. The gallbladder was then grasped and retracted upwards and removed from the gallbladder fossa using electrocautery. It was placed into a bag and removed from the epigastric port. The trocars were reinserted and the gallbladder fossa was inspected and the bleeding gallbladder fossa was controlled using electrocautery for hemostasis. The 10/12 mm port site was closed with mersilene and then the ports removed under direct visualization and the abdomen was desufflated. The skin sites were closed using subcuticular Vicryl stitches and local anesthetic was injected. The patient was awakened and taken to the recovery room in good condition. All counts were correct at the end of the case  Assistant: Nani Rothman, DANITZA  was responsible for performing the following activities: Retraction, Suction, and Held/Positioned Camera and their skilled assistance was necessary for the success of this case.    Marbella Razo MD     Date: 7/17/2023  Time: 18:24 EDT

## 2023-07-17 NOTE — CONSULTS
[facility]  Initial Inpatient Consult Note    Referring Provider: Hospitalist    Reason for Consultation: Gallstones, acute cholecystitis    Subjective     History of present illness:    57 y.o. female with a history of gallstones, hypertension and PTSD who was in her usual state of health until this morning when she awakened with significant right upper quadrant and epigastric pain as well as nausea.  She came to emergency department because of the worsening pain with some extension radiating through towards her back.  On arrival here CT scan was suggestive of acute cholecystitis with gallstones.  Subsequent MRCP showed possible 3 mm filling defect of the common bile duct and a 9 mm CBD.  Patient's AST, ALT, WBCs were all normal.  She is not operative holding scheduled to undergo cholecystectomy.    Past Medical History:  Past Medical History:   Diagnosis Date    Anxiety     Calculus of gallbladder 2023    Endometriosis     Hypertension     Kidney stone     Other insomnia     PTSD (post-traumatic stress disorder)      Past Surgical History:  Past Surgical History:   Procedure Laterality Date     SECTION      x3    KIDNEY STONE SURGERY      OTHER SURGICAL HISTORY      Endometriosis      Social History:   Social History     Tobacco Use    Smoking status: Every Day     Packs/day: 0.25     Years: 37.00     Pack years: 9.25     Types: Cigarettes    Smokeless tobacco: Never    Tobacco comments:     1-2 CIGS PER DAY   Substance Use Topics    Alcohol use: Never      Family History:  Family History   Adopted: Yes       Home Meds:  Medications Prior to Admission   Medication Sig Dispense Refill Last Dose    albuterol sulfate  (90 Base) MCG/ACT inhaler Inhale 2 puffs Every 4 (Four) Hours As Needed for Wheezing or Shortness of Air. 18 g 2     amLODIPine-benazepril (Lotrel) 10-20 MG per capsule Take 1 capsule by mouth Daily. 90 capsule 2 2023    chlorthalidone (HYGROTON) 25 MG  tablet Take 1 tablet by mouth Daily. 90 tablet 2 7/16/2023    gabapentin (NEURONTIN) 300 MG capsule Take 1 capsule by mouth 4 (Four) Times a Day. 120 capsule 0 7/16/2023    ibuprofen (ADVIL,MOTRIN) 800 MG tablet Take 1 tablet by mouth Every 8 (Eight) Hours As Needed.       ondansetron ODT (ZOFRAN-ODT) 4 MG disintegrating tablet Place 1 tablet on the tongue Every 8 (Eight) Hours As Needed for Nausea or Vomiting. 10 tablet 0     traZODone (DESYREL) 50 MG tablet Take 1 tablet by mouth Every Night. 90 tablet 1 7/16/2023     Current Meds:   [START ON 7/18/2023] enoxaparin, 40 mg, Subcutaneous, Daily  piperacillin-tazobactam, 3.375 g, Intravenous, Q8H  sodium chloride, 10 mL, Intravenous, Q12H      Allergies:  No Known Allergies  Review of Systems  Pertinent items are noted in HPI, all other systems reviewed and negative         Vital Signs  Temp:  [97.7 °F (36.5 °C)-98.4 °F (36.9 °C)] 97.7 °F (36.5 °C)  Heart Rate:  [] 81  Resp:  [18-20] 18  BP: (129-161)/(68-88) 144/86  Physical Exam:  General Appearance:    Alert, cooperative, in no acute distress   Head:    Normocephalic, without obvious abnormality, atraumatic   Eyes:          conjunctivae and sclerae normal, no   icterus   Throat:   no thrush, oral mucosa moist   Neck:   Supple, no adenopathy   Lungs:     Clear to auscultation bilaterally    Heart:    Regular rhythm and normal rate    Chest Wall:    No abnormalities observed   Abdomen:     Soft, nondistended, nontender; normal bowel sounds   Extremities:   no edema, no redness   Skin:   No bruising or rash   Psychiatric:  normal mood and insight     Results Review:  [x]  Laboratory   [x]  Radiology  []  Pathology      I reviewed the patient's new clinical results.    Results from last 7 days   Lab Units 07/17/23  0607   WBC 10*3/mm3 8.49   HEMOGLOBIN g/dL 13.9   HEMATOCRIT % 40.7   PLATELETS 10*3/mm3 224     Results from last 7 days   Lab Units 07/17/23  0607   SODIUM mmol/L 143   POTASSIUM mmol/L 3.5    CHLORIDE mmol/L 107   CO2 mmol/L 24.6   BUN mg/dL 14   CREATININE mg/dL 0.80   CALCIUM mg/dL 9.8   BILIRUBIN mg/dL 0.3   ALK PHOS U/L 87   ALT (SGPT) U/L 16   AST (SGOT) U/L 18   GLUCOSE mg/dL 120*         Lab Results   Lab Value Date/Time    LIPASE 27 07/17/2023 0607    LIPASE 23 03/13/2022 1112       Radiology:  MRI abdomen wo contrast mrcp   Final Result      XR Chest 1 View   Final Result       No acute cardiopulmonary process.                             FAISAL RIVERA MD          Electronically Signed and Approved By: FAISAL RIVERA MD on 7/17/2023 at 9:20                           CT Abdomen Pelvis With Contrast   Final Result       1. There is gallbladder distention as well as a small amount of pericholecystic fluid, raising the    possibility of acute cholecystitis.   2. There is mild biliary duct dilation measuring 9 mm, cannot exclude a choledocholithiasis.                DAYANARA JIMENEZ MD          Electronically Signed and Approved By: DAYANAAR JIMENEZ MD on 7/17/2023 at 8:56                                Assessment & Plan       Acute cholecystitis    Calculus of gallbladder    Choledocholithiasis with acute cholecystitis       Plan:  Patient with right upper quadrant pain and gallstones seen by MRI and mild distention of CBD at 9 mm with possible choledocholithiasis.  I agree with plan for cholecystectomy this afternoon.  Her LFTs are currently normal without leukocytosis.  I will continue to trend her LFTs and based on those results consider need for ERCP tomorrow morning.      I discussed the patients findings and my recommendations with patient and nursing staff.    Eric Silvestre MD

## 2023-07-17 NOTE — H&P
[facility] HISTORY AND PHYSICAL  Date: 2023   Patient Name: Vita Cuevas  : 1965  MRN: [id number]  Primary Care Physician:  Thierno Li MD  Date of admission: 2023    Subjective   Subjective     Chief Complaint: Abdominal pain    HPI:    Vita Cuevas is a 57 y.o. female past medical history of hypertension, anxiety, and PTSD who presents with right upper quadrant pain started this morning    She states she is only had pain like this 1 time it was a kidney stone.  She developed right upper quadrant pain this morning this completely debilitating so she came to the ER for further evaluation.  No fevers or chills.     In the emergency department: Patient's vital signs were as follows temperature 98.4, pulse 55, respiratory is 20, blood pressure 120/60, and a percent room air ECG shows no abnormalities.  CMP shows a glucose of 120 no other abnormalities.  Normal lactate.  Urinalysis was bland.  X-ray shows no abnormalities.  CT of the abdomen pelvis shows gallbladder distention as well as small mount of pericholecystic fluid raising the possibility of acute cholecystitis and there is mild biliary duct dilation with 9 mm.  MRCP shows cholelithiasis distended gallbladder with pericholecystic fluid concerning for gallbladder and dilated common bile duct of 9 mm.  Questionable 3 mm stone but this could be due to motion artifact.  Emergency department physician contacted GI at [place] but did not feel that the CBD was actually dilated and recommended general surgery to remove gallbladder and if there is an stone in the interoperative cholangiogram then would need GI to do an ERCP at that time.  Patient will be admitted to the hospital for further management and removal of gallbladder.    All systems reviewed and are as noted above    Personal History     Past Medical History:  Hypertension  Anxiety  Endometriosis  PTSD      Past Surgical History:    section  Kidney stone      Family History:   Patient was adopted so no known family history    Social History:   Smokes every day  No alcohol      Home Medications:  albuterol sulfate HFA, amLODIPine-benazepril, chlorthalidone, gabapentin, ibuprofen, ondansetron ODT, and traZODone    Allergies:  No Known Allergies      Objective   Objective     Vitals:   Temp:  [98.4 °F (36.9 °C)] 98.4 °F (36.9 °C)  Heart Rate:  [] 55  Resp:  [20] 20  BP: (129-161)/(68-88) 129/68    Physical Exam    Constitutional: Awake, alert, no acute distress   Eyes: Pupils equal, sclerae anicteric, no conjunctival injection   HENT: NCAT, mucous membranes moist   Neck: Supple, no thyromegaly, no lymphadenopathy, trachea midline   Respiratory: Clear to auscultation bilaterally, nonlabored respirations    Cardiovascular: RRR, no murmurs, rubs, or gallops, palpable pedal pulses bilaterally   Gastrointestinal: Right upper quadrant pain.   Musculoskeletal: No bilateral ankle edema, no clubbing or cyanosis to extremities   Psychiatric: Appropriate affect, cooperative   Neurologic: Oriented x 3, strength symmetric in all extremities, Cranial Nerves grossly intact to confrontation, speech clear   Skin: No rashes     Result Review    Result Review:  I have personally reviewed the results from the time of this admission to 7/17/2023 13:44 EDT and agree with these findings:  Reviewed imaging and lab    Assessment & Plan   Assessment / Plan     Assessment/Plan:   Acute cholecystitis  Possible choledocholithiasis    Plan:  -- Admit to hospital service  -- Consult general surgery  -- Continue Zosyn  -- N.p.o. for possible OR  -- IV morphine for pain control  -- The plan will be for surgery to remove her gallbladder and she has a common bile duct stone that we will need to get GI involved at that point.      DVT prophylaxis:  Lovenox    CODE STATUS:     Full code    Admission Status:  I believe this patient meets observation status.    Electronically  signed by Reggie Castrejon MD, 07/17/23, 1:44 PM EDT.

## 2023-07-17 NOTE — ED PROVIDER NOTES
Time: 8:42 AM EDT  Date of encounter:  2023  Independent Historian/Clinical History and Information was obtained by:   Patient    History is limited by: N/A    Chief Complaint   Patient presents with    Abdominal Pain    Flank Pain         History of Present Illness:  Patient is a 57 y.o. year old female who presents to the emergency department for evaluation of upper abd pain and bilateral flank pain. Woke from sleep with pain. Patient states she felt fine last night. Endorses nausea without vomiting. Denies fever/chills. Denies urinary symptoms. Denies constipation or diarrhea. Pain is constant, sharp and nothing makes better.     HPI    Patient Care Team  Primary Care Provider: Thierno Li MD    Past Medical History:     No Known Allergies  Past Medical History:   Diagnosis Date    Anxiety     Calculus of gallbladder 2023    Endometriosis     Hypertension     Kidney stone     Other insomnia     PTSD (post-traumatic stress disorder)      Past Surgical History:   Procedure Laterality Date     SECTION      x3    KIDNEY STONE SURGERY      OTHER SURGICAL HISTORY      Endometriosis     Family History   Adopted: Yes       Home Medications:  Prior to Admission medications    Medication Sig Start Date End Date Taking? Authorizing Provider   albuterol sulfate  (90 Base) MCG/ACT inhaler Inhale 2 puffs Every 4 (Four) Hours As Needed for Wheezing or Shortness of Air. 3/10/23   Thierno Li MD   amLODIPine-benazepril (Lotrel) 10-20 MG per capsule Take 1 capsule by mouth Daily. 22   Thierno Li MD   chlorthalidone (HYGROTON) 25 MG tablet Take 1 tablet by mouth Daily. 22   Thierno Li MD   gabapentin (NEURONTIN) 300 MG capsule Take 1 capsule by mouth 4 (Four) Times a Day. 23   Jake Lima Jr., MD   ibuprofen (ADVIL,MOTRIN) 800 MG tablet  23   Provider, MD Chantal   ondansetron ODT (ZOFRAN-ODT) 4 MG disintegrating tablet Place 1 tablet on the  "tongue Every 8 (Eight) Hours As Needed for Nausea or Vomiting. 6/29/23   Breann Bourgeois APRN   traZODone (DESYREL) 50 MG tablet Take 1 tablet by mouth Every Night. 6/16/23   Breann Bouregois APRN        Social History:   Social History     Tobacco Use    Smoking status: Every Day     Packs/day: 0.25     Years: 37.00     Pack years: 9.25     Types: Cigarettes    Smokeless tobacco: Never    Tobacco comments:     1-2 CIGS PER DAY   Vaping Use    Vaping Use: Never used   Substance Use Topics    Alcohol use: Never    Drug use: Never         Review of Systems:  Review of Systems   Constitutional:  Negative for chills and fever.   HENT:  Negative for congestion, rhinorrhea and sore throat.    Eyes:  Negative for pain and visual disturbance.   Respiratory:  Negative for apnea, cough, chest tightness and shortness of breath.    Cardiovascular:  Negative for chest pain and palpitations.   Gastrointestinal:  Positive for abdominal pain. Negative for diarrhea, nausea and vomiting.   Genitourinary:  Negative for difficulty urinating and dysuria.   Musculoskeletal:  Negative for joint swelling and myalgias.   Skin:  Negative for color change.   Neurological:  Negative for seizures and headaches.   Psychiatric/Behavioral: Negative.     All other systems reviewed and are negative.     Physical Exam:  /86 (BP Location: Right arm, Patient Position: Lying)   Pulse 81   Temp 97.7 °F (36.5 °C) (Oral)   Resp 18   Ht 162.6 cm (64\")   Wt 86 kg (189 lb 9.5 oz)   SpO2 99%   BMI 32.54 kg/m²         Physical Exam  Vitals and nursing note reviewed.   Constitutional:       General: She is not in acute distress.     Appearance: Normal appearance. She is not toxic-appearing.   HENT:      Head: Normocephalic and atraumatic.      Jaw: There is normal jaw occlusion.   Eyes:      General: Lids are normal.      Extraocular Movements: Extraocular movements intact.      Conjunctiva/sclera: Conjunctivae normal.      Pupils: Pupils are " equal, round, and reactive to light.   Cardiovascular:      Rate and Rhythm: Normal rate and regular rhythm.      Pulses: Normal pulses.      Heart sounds: Normal heart sounds.   Pulmonary:      Effort: Pulmonary effort is normal. No respiratory distress.      Breath sounds: Normal breath sounds. No wheezing or rhonchi.   Abdominal:      General: Abdomen is flat.      Palpations: Abdomen is soft.      Tenderness: There is abdominal tenderness in the right upper quadrant. There is no guarding or rebound.   Musculoskeletal:         General: Normal range of motion.      Cervical back: Normal range of motion and neck supple.      Right lower leg: No edema.      Left lower leg: No edema.   Skin:     General: Skin is warm and dry.   Neurological:      Mental Status: She is alert and oriented to person, place, and time. Mental status is at baseline.   Psychiatric:         Mood and Affect: Mood normal.                    Procedures:  Procedures      Medical Decision Making:      Comorbidities that affect care:    None    External Notes reviewed:    None      The following orders were placed and all results were independently analyzed by me:  Orders Placed This Encounter   Procedures    Blood Culture - Blood,    Blood Culture - Blood,    CT Abdomen Pelvis With Contrast    XR Chest 1 View    MRI abdomen wo contrast mrcp    FL Surgery Fluoro    Altha Draw    Comprehensive Metabolic Panel    Lipase    Urinalysis With Microscopic If Indicated (No Culture) - Urine, Clean Catch    Lactic Acid, Plasma    CBC Auto Differential    CBC Auto Differential    Comprehensive Metabolic Panel    Magnesium    Protime-INR    CBC (No Diff)    NPO Diet NPO Type: Strict NPO    Undress & Gown    Verify / Perform Chlorhexidine Skin Prep    Verify / Perform Chlorhexidine Skin Prep if Indicated (If Not Already Completed)    Perform Chlorhexidine Skin Prep Night Before and Morning of Procedure    Obtain Informed Consent    Vital Signs    Intake &  Output    Weigh Patient    Oral Care    Saline Lock & Maintain IV Access    Daily Weights    Strict Intake & Output    Opioid Administration - Document EtCO2 and / or SpO2 With Each Set of Vitals & Any Change in Patient Status    Opioid Administration - Notify Provider Hypercapnic Monitoring    Opioid Administration - Continuous Pulse Oximetry (SpO2)    Surgery (on-call MD unless specified)    IP General Consult (Use specialty-specific consult if known)    Inpatient Hospitalist Consult    Inpatient General Surgery Consult    Inpatient Consult to Advance Care Planning    Insert Peripheral IV    Insert Peripheral IV    Insert Peripheral IV    Initiate Observation Status    CBC & Differential    Green Top (Gel)    Lavender Top    Gold Top - SST    Light Blue Top       Medications Given in the Emergency Department:  Medications   sodium chloride 0.9 % flush 10 mL (has no administration in time range)   sodium chloride 0.9 % flush 10 mL (has no administration in time range)   sodium chloride 0.9 % flush 10 mL (has no administration in time range)   sodium chloride 0.9 % flush 10 mL (has no administration in time range)   sodium chloride 0.9 % infusion 40 mL (has no administration in time range)   Enoxaparin Sodium (LOVENOX) syringe 40 mg (has no administration in time range)   morphine injection 4 mg (4 mg Intravenous Given 7/17/23 1452)     And   naloxone (NARCAN) injection 0.4 mg (has no administration in time range)   ondansetron (ZOFRAN) injection 4 mg (4 mg Intravenous Given 7/17/23 1452)   lactated ringers infusion ( Intravenous New Bag 7/17/23 1800)   Pharmacy to Dose Zosyn (has no administration in time range)   piperacillin-tazobactam (ZOSYN) 3.375 g/100 mL 0.9% NS IVPB (mbp) (has no administration in time range)   iopamidol (ISOVUE-370) 76 % injection 100 mL (100 mL Intravenous Given 7/17/23 0840)   ondansetron (ZOFRAN) injection 4 mg (4 mg Intravenous Given 7/17/23 0858)   morphine injection 4 mg (4 mg  Intravenous Given 7/17/23 0859)   dicyclomine (BENTYL) injection 20 mg (20 mg Intramuscular Given 7/17/23 0855)   famotidine (PEPCID) injection 20 mg (20 mg Intravenous Given 7/17/23 0901)   fentaNYL citrate (PF) (SUBLIMAZE) injection 50 mcg (50 mcg Intravenous Given 7/17/23 0955)   sodium chloride 0.9 % bolus 1,000 mL (0 mL Intravenous Stopped 7/17/23 1308)   LORazepam (ATIVAN) injection 1 mg (1 mg Intravenous Given 7/17/23 1029)   piperacillin-tazobactam (ZOSYN) 3.375 g/100 mL 0.9% NS IVPB (mbp) (3.375 g Intravenous New Bag 7/17/23 1438)   indocyanine green (IC-GREEN) injection 2.5 mg (2.5 mg Intravenous Given 7/17/23 1612)   Midazolam HCl (PF) (VERSED) injection 2 mg (2 mg Intravenous Given 7/17/23 1636)        ED Course:    The patient was initially evaluated in the triage area where orders were placed. The patient was later dispositioned by Javier Matias MD.      The patient was advised to stay for completion of workup which includes but is not limited to communication of labs and radiological results, reassessment and plan. The patient was advised that leaving prior to disposition by a provider could result in critical findings that are not communicated to the patient.          Labs:    Lab Results (last 24 hours)       Procedure Component Value Units Date/Time    CBC & Differential [[id number]]  (Abnormal) Collected: 07/17/23 0607    Specimen: Blood Updated: 07/17/23 0656    Narrative:      The following orders were created for panel order CBC & Differential.  Procedure                               Abnormality         Status                     ---------                               -----------         ------                     CBC Auto Differential[[id number]]        Abnormal            Final result               Scan Slide[[id number]]                                                                    Please view results for these tests on the individual orders.    Comprehensive Metabolic Panel [[id number]]   (Abnormal) Collected: 07/17/23 0607    Specimen: Blood Updated: 07/17/23 0639     Glucose 120 mg/dL      BUN 14 mg/dL      Creatinine 0.80 mg/dL      Sodium 143 mmol/L      Potassium 3.5 mmol/L      Chloride 107 mmol/L      CO2 24.6 mmol/L      Calcium 9.8 mg/dL      Total Protein 7.2 g/dL      Albumin 4.4 g/dL      ALT (SGPT) 16 U/L      AST (SGOT) 18 U/L      Alkaline Phosphatase 87 U/L      Total Bilirubin 0.3 mg/dL      Globulin 2.8 gm/dL      A/G Ratio 1.6 g/dL      BUN/Creatinine Ratio 17.5     Anion Gap 11.4 mmol/L      eGFR 86.6 mL/min/1.73     Narrative:      GFR Normal >60  Chronic Kidney Disease <60  Kidney Failure <15      Lipase [[id number]]  (Normal) Collected: 07/17/23 0607    Specimen: Blood Updated: 07/17/23 0639     Lipase 27 U/L     Lactic Acid, Plasma [[id number]]  (Normal) Collected: 07/17/23 0607    Specimen: Blood Updated: 07/17/23 0634     Lactate 1.8 mmol/L     CBC Auto Differential [[id number]]  (Abnormal) Collected: 07/17/23 0607    Specimen: Blood Updated: 07/17/23 0656     WBC 8.49 10*3/mm3      RBC 4.58 10*6/mm3      Hemoglobin 13.9 g/dL      Hematocrit 40.7 %      MCV 88.9 fL      MCH 30.3 pg      MCHC 34.2 g/dL      RDW 12.8 %      RDW-SD 41.7 fl      MPV 11.4 fL      Platelets 224 10*3/mm3      Neutrophil % 30.4 %      Lymphocyte % 58.8 %      Monocyte % 6.6 %      Eosinophil % 3.2 %      Basophil % 0.8 %      Immature Grans % 0.2 %      Neutrophils, Absolute 2.58 10*3/mm3      Lymphocytes, Absolute 4.99 10*3/mm3      Monocytes, Absolute 0.56 10*3/mm3      Eosinophils, Absolute 0.27 10*3/mm3      Basophils, Absolute 0.07 10*3/mm3      Immature Grans, Absolute 0.02 10*3/mm3      nRBC 0.0 /100 WBC     Urinalysis With Microscopic If Indicated (No Culture) - Urine, Clean Catch [[id number]]  (Normal) Collected: 07/17/23 0802    Specimen: Urine, Clean Catch Updated: 07/17/23 0825     Color, UA Yellow     Appearance, UA Clear     pH, UA 5.5     Specific Gravity, UA 1.016     Glucose, UA  Negative     Ketones, UA Negative     Bilirubin, UA Negative     Blood, UA Negative     Protein, UA Negative     Leuk Esterase, UA Negative     Nitrite, UA Negative     Urobilinogen, UA 1.0 E.U./dL    Narrative:      Urine microscopic not indicated.    Blood Culture - Blood, Arm, Left [[id number]] Collected: 07/17/23 1401    Specimen: Blood from Arm, Left Updated: 07/17/23 1411    Blood Culture - Blood, Arm, Left [[id number]] Collected: 07/17/23 1406    Specimen: Blood from Arm, Left Updated: 07/17/23 1411             Imaging:    CT Abdomen Pelvis With Contrast    Result Date: 7/17/2023  PROCEDURE: CT ABDOMEN PELVIS W CONTRAST  COMPARISON: [facility], CT, ABD PEL W/O CONTRAST, 7/21/2013, 4:25.  INDICATIONS: Abdominal and bilateral flank pain  TECHNIQUE: After obtaining the patient's consent, CT images were created with non-ionic intravenous contrast material.   PROTOCOL:   Standard imaging protocol performed    RADIATION:   DLP: 942.6mGy*cm   Automated exposure control was utilized to minimize radiation dose. CONTRAST: 100cc Isovue 370 I.V.  FINDINGS:  Limited views of the lung bases are unremarkable.  There are scattered cysts within the liver.  The portal vasculature is patent.  Common bile duct is dilated measuring 9 mm.  There is mild distension and stranding surrounding the gallbladder, which could reflect cholecystitis.  The pancreas is unremarkable.  Spleen is normal.  Bilateral adrenal glands are normal.  There is a simple right renal cyst.  The left kidney is unremarkable.  The bladder is unremarkable.  Fibroid uterus.  There is diverticulosis without evidence of diverticulitis identified.  The appendix is normal.  The small bowel is unremarkable.  No adenopathy is identified.  Scattered atherosclerotic plaque.  No aggressive appearing lytic or sclerotic bone lesions are identified.        1. There is gallbladder distention as well as a small amount of pericholecystic fluid, raising the  possibility of acute cholecystitis. 2. There is mild biliary duct dilation measuring 9 mm, cannot exclude a choledocholithiasis.     DAYANARA JIMENEZ MD       Electronically Signed and Approved By: DAYANARA JIMENEZ MD on 7/17/2023 at 8:56             XR Chest 1 View    Result Date: 7/17/2023  PROCEDURE: XR CHEST 1 VW  COMPARISON: [facility], CR, CHEST AP/PA 1 VIEW, 4/28/2016, 20:36.  INDICATIONS: SOB, CHEST PAIN, BACK PAIN  FINDINGS:  The lungs are adequately expanded. There is no focal consolidation, pneumothorax, or obvious pleural effusion. The pulmonary vasculature appears within normal limits. The cardiac and mediastinal contours are within normal limits. The osseous structures appear intact.        No acute cardiopulmonary process.        FAISAL RIVERA MD       Electronically Signed and Approved By: FAISAL RIVERA MD on 7/17/2023 at 9:20             MRI abdomen wo contrast mrcp    Result Date: 7/17/2023  PROCEDURE: MRI ABDOMEN WO CONTRAST MRCP  COMPARISON: [facility], CT, CT ABDOMEN PELVIS W CONTRAST, 7/17/2023, 8:27.  INDICATIONS: Abdominal pain, CBD 9 mm on CT scan. Gallbladder distention, mild biliary ductal dilatation.      TECHNIQUE: MRI of the abdomen without intravenous contrast. MRCP without contrast. 3-D MIP  images were performed.  ABDOMEN:  There are a few hepatic cysts measuring up to 1.5 cm.  There is a 2 cm right renal cyst.  The left kidney is normal.  The spleen, pancreas and adrenal glands are normal.  The abdominal aorta has a normal caliber.   MRCP:  Motion artifact is present.  The gallbladder is distended.  There are tiny stones in the gallbladder.  Pericholecystic fluid/inflammatory change is present.  There is no pancreatic ductal dilatation.  The common bile duct is dilated measuring 9 mm.  Questionable 3 mm stone in the distal common bile duct.   IMPRESSION:  1. Cholelithiasis.  Distended gallbladder with pericholecystic fluid/inflammatory change suspicious for acute  cholecystitis.  2. Dilated common bile duct measuring 9 mm.  Questionable 3 mm stone in the distal common bile duct.  The images are obscured by motion artifact.   ALBERTO CHRISTINA MD       Electronically Signed and Approved By: ALBERTO CHRISTINA MD on 7/17/2023 at 11:57             FL Surgery Fluoro    Result Date: 7/17/2023  This procedure was auto-finalized with no dictation required.       Differential Diagnosis and Discussion:      Abdominal Pain: Based on the patient's signs and symptoms, I considered abdominal aortic aneurysm, small bowel obstruction, pancreatitis, acute cholecystitis, acute appendecitis, peptic ulcer disease, gastritis, colitis, endocrine disorders, irritable bowel syndrome and other differential diagnosis an etiology of the patient's abdominal pain.    All labs were reviewed and interpreted by me.  CT scan radiology impression was interpreted by me.  MRI impression was interpreted by me.     MDM  Number of Diagnoses or Management Options  Choledocholithiasis with acute cholecystitis  Diagnosis management comments: In summary this is a 57-year-old female who presents to the emergency department for evaluation of abdominal pain.  CT scan shows concern for acute cholecystitis but a slightly elevated CBD at 9 mm.  CBC independently reviewed by me and shows no critical abnormalities.  CMP independently reviewed by me and shows no critical abnormalities, specifically normal LFTs, normal bilirubin.  Lipase level normal.  Given the slightly elevated CBD MRCP was ordered at the request of general surgery and confirms acute cholecystitis and a questionable 3 mm distal common bile duct stone.  Patient case has been discussed with the hospitalist team who will admit to the hospital for further evaluation and continuation of treatment.             Patient Care Considerations:    CONSULT: I considered consulting gastroenterology, however this will be accomplished inpatient if necessary  postcholecystectomy      Consultants/Shared Management Plan:    Hospitalist: I have discussed the case with Dr. Castrejon who agrees to accept the patient for admission.  Consultant: I have discussed the case with Dr. Razo who agrees to consult on the patient.    Social Determinants of Health:    Patient is independent, reliable, and has access to care.       Disposition and Care Coordination:    Admit:   Through independent evaluation of the patient's history, physical, and imperical data, the patient meets criteria for observation/admission to the hospital.        Final diagnoses:   Choledocholithiasis with acute cholecystitis        ED Disposition       ED Disposition   Decision to Admit    Condition   --    Comment   Level of Care: Med/Surg [1]   Diagnosis: Acute cholecystitis [575.0.ICD-9-CM]                 This medical record created using voice recognition software.             Javier Matias MD  07/17/23 1824

## 2023-07-17 NOTE — H&P
History and Physical    Patient Name:  Vita Cuevas  YOB: 1965  [id number]    Referring Provider: Dr. Matias      Patient Care Team:  Thierno Li MD as PCP - General (Internal Medicine)    Reason for consult/Chief complaint:  abd pain     Subjective .     History of present illness: Ms. Cuevas is  a 57 year old female who presents to the  ED at [facility] today with RUQ pain and nausea that started this morning.  She reports the pain is an intermittent paint that is squeezing and stabbing and her pain is 10/10 at its worse.   She had an MRCP that indicates she has cholelithiasis.  Her WBC is 8. ALT, AST, and total bilirubin are normal.       History:  Past Medical History:   Diagnosis Date    Anxiety     Calculus of gallbladder 2023    Endometriosis     Hypertension     Kidney stone     Other insomnia     PTSD (post-traumatic stress disorder)        Past Surgical History:   Procedure Laterality Date     SECTION      x3    KIDNEY STONE SURGERY      OTHER SURGICAL HISTORY      Endometriosis       Family History   Adopted: Yes       Social History     Tobacco Use    Smoking status: Every Day     Packs/day: 0.25     Years: 37.00     Pack years: 9.25     Types: Cigarettes    Smokeless tobacco: Never    Tobacco comments:     1-2 CIGS PER DAY   Vaping Use    Vaping Use: Never used   Substance Use Topics    Alcohol use: Never    Drug use: Never       Review of Systems:  A complete ROS was performed and all are negative except for what is documented in the HPI.    MEDS:  Prior to Admission medications    Medication Sig Start Date End Date Taking? Authorizing Provider   albuterol sulfate  (90 Base) MCG/ACT inhaler Inhale 2 puffs Every 4 (Four) Hours As Needed for Wheezing or Shortness of Air. 3/10/23  Yes Thierno Li MD   amLODIPine-benazepril (Lotrel) 10-20 MG per capsule Take 1 capsule by mouth Daily. 22  Yes Thierno Li MD   chlorthalidone (HYGROTON) 25 MG tablet  "Take 1 tablet by mouth Daily. 12/28/22  Yes Thierno Li MD   gabapentin (NEURONTIN) 300 MG capsule Take 1 capsule by mouth 4 (Four) Times a Day. 7/11/23  Yes Jake Lima Jr., MD   ibuprofen (ADVIL,MOTRIN) 800 MG tablet Take 1 tablet by mouth Every 8 (Eight) Hours As Needed. 6/28/23  Yes ProviderChantal MD   ondansetron ODT (ZOFRAN-ODT) 4 MG disintegrating tablet Place 1 tablet on the tongue Every 8 (Eight) Hours As Needed for Nausea or Vomiting. 6/29/23  Yes Breann Bourgeois APRN   traZODone (DESYREL) 50 MG tablet Take 1 tablet by mouth Every Night. 6/16/23  Yes Breann Bourgeois APRN        indocyanine green, 2.5 mg, Intravenous, On Call to OR  piperacillin-tazobactam, 3.375 g, Intravenous, Once               Allergies:  Patient has no known allergies.    Objective         Physical Exam:      Constitutional:  well nourished, no acute distress, appear stated age /83   Pulse 63   Temp 98.4 °F (36.9 °C) (Oral)   Resp 20   Ht 162.6 cm (64\")   Wt 86 kg (189 lb 9.5 oz)   SpO2 98%   BMI 32.54 kg/m²    Eyes:  anicteric sclerae, moist conjunctivae, no lid lag, PERRLA  ENMT:  oropharynx clear, moist mucous membranes, good dentition  Neck:   full ROM, trachea midline, no thyromegaly  Cardiovascular: RRR, S1 and S2 present, no MRG, heart rate 63, no pedal edema  Respiratory: lungs CTA, respirations even and unlabored  GI:  Abdomen soft, nontender, nondistended, no HSM     Skin:  warm and dry, normal turgor, no rashes  Psychiatric:  alert and oriented x3, intact judgment and insight, cooperative         Results Review: I have reviewed the patient's labs and imaging including CBC, BMP, MRCP    LABS/IMAGING:    WBC   Date Value Ref Range Status   07/17/2023 8.49 3.40 - 10.80 10*3/mm3 Final     RBC   Date Value Ref Range Status   07/17/2023 4.58 3.77 - 5.28 10*6/mm3 Final     Hemoglobin   Date Value Ref Range Status   07/17/2023 13.9 12.0 - 15.9 g/dL Final     Hematocrit   Date Value Ref Range " Status   07/17/2023 40.7 34.0 - 46.6 % Final     MCV   Date Value Ref Range Status   07/17/2023 88.9 79.0 - 97.0 fL Final     MCH   Date Value Ref Range Status   07/17/2023 30.3 26.6 - 33.0 pg Final     MCHC   Date Value Ref Range Status   07/17/2023 34.2 31.5 - 35.7 g/dL Final     RDW   Date Value Ref Range Status   07/17/2023 12.8 12.3 - 15.4 % Final     RDW-SD   Date Value Ref Range Status   07/17/2023 41.7 37.0 - 54.0 fl Final     MPV   Date Value Ref Range Status   07/17/2023 11.4 6.0 - 12.0 fL Final     Platelets   Date Value Ref Range Status   07/17/2023 224 140 - 450 10*3/mm3 Final     Neutrophil %   Date Value Ref Range Status   07/17/2023 30.4 (L) 42.7 - 76.0 % Final     Lymphocyte %   Date Value Ref Range Status   07/17/2023 58.8 (H) 19.6 - 45.3 % Final     Monocyte %   Date Value Ref Range Status   07/17/2023 6.6 5.0 - 12.0 % Final     Eosinophil %   Date Value Ref Range Status   07/17/2023 3.2 0.3 - 6.2 % Final     Basophil %   Date Value Ref Range Status   07/17/2023 0.8 0.0 - 1.5 % Final     Immature Grans %   Date Value Ref Range Status   07/17/2023 0.2 0.0 - 0.5 % Final     Neutrophils, Absolute   Date Value Ref Range Status   07/17/2023 2.58 1.70 - 7.00 10*3/mm3 Final     Lymphocytes, Absolute   Date Value Ref Range Status   07/17/2023 4.99 (H) 0.70 - 3.10 10*3/mm3 Final     Monocytes, Absolute   Date Value Ref Range Status   07/17/2023 0.56 0.10 - 0.90 10*3/mm3 Final     Eosinophils, Absolute   Date Value Ref Range Status   07/17/2023 0.27 0.00 - 0.40 10*3/mm3 Final     Basophils, Absolute   Date Value Ref Range Status   07/17/2023 0.07 0.00 - 0.20 10*3/mm3 Final     Immature Grans, Absolute   Date Value Ref Range Status   07/17/2023 0.02 0.00 - 0.05 10*3/mm3 Final     nRBC   Date Value Ref Range Status   07/17/2023 0.0 0.0 - 0.2 /100 WBC Final        Basic Metabolic Panel    Sodium Sodium   Date Value Ref Range Status   07/17/2023 143 136 - 145 mmol/L Final      Potassium Potassium   Date Value  Ref Range Status   07/17/2023 3.5 3.5 - 5.2 mmol/L Final      Chloride Chloride   Date Value Ref Range Status   07/17/2023 107 98 - 107 mmol/L Final      Bicarbonate No results found for: PLASMABICARB   BUN BUN   Date Value Ref Range Status   07/17/2023 14 6 - 20 mg/dL Final      Creatinine Creatinine   Date Value Ref Range Status   07/17/2023 0.80 0.57 - 1.00 mg/dL Final      Calcium Calcium   Date Value Ref Range Status   07/17/2023 9.8 8.6 - 10.5 mg/dL Final      Glucose      No components found for: GLUCOSE.*        Lab Results   Component Value Date    GLUCOSE 120 (H) 07/17/2023    BUN 14 07/17/2023    CREATININE 0.80 07/17/2023    BCR 17.5 07/17/2023    K 3.5 07/17/2023    CO2 24.6 07/17/2023    CALCIUM 9.8 07/17/2023    ALBUMIN 4.4 07/17/2023    AST 18 07/17/2023    ALT 16 07/17/2023          CT Abdomen Pelvis With Contrast    Result Date: 7/17/2023  PROCEDURE: CT ABDOMEN PELVIS W CONTRAST  COMPARISON: [facility], CT, ABD PEL W/O CONTRAST, 7/21/2013, 4:25.  INDICATIONS: Abdominal and bilateral flank pain  TECHNIQUE: After obtaining the patient's consent, CT images were created with non-ionic intravenous contrast material.   PROTOCOL:   Standard imaging protocol performed    RADIATION:   DLP: 942.6mGy*cm   Automated exposure control was utilized to minimize radiation dose. CONTRAST: 100cc Isovue 370 I.V.  FINDINGS:  Limited views of the lung bases are unremarkable.  There are scattered cysts within the liver.  The portal vasculature is patent.  Common bile duct is dilated measuring 9 mm.  There is mild distension and stranding surrounding the gallbladder, which could reflect cholecystitis.  The pancreas is unremarkable.  Spleen is normal.  Bilateral adrenal glands are normal.  There is a simple right renal cyst.  The left kidney is unremarkable.  The bladder is unremarkable.  Fibroid uterus.  There is diverticulosis without evidence of diverticulitis identified.  The appendix is normal.  The  small bowel is unremarkable.  No adenopathy is identified.  Scattered atherosclerotic plaque.  No aggressive appearing lytic or sclerotic bone lesions are identified.      Impression:   1. There is gallbladder distention as well as a small amount of pericholecystic fluid, raising the possibility of acute cholecystitis. 2. There is mild biliary duct dilation measuring 9 mm, cannot exclude a choledocholithiasis.     DAYANARA JIMENEZ MD       Electronically Signed and Approved By: DAYANARA JIMENEZ MD on 7/17/2023 at 8:56                   Assessment & Plan         Calculus of gallbladder    Npo   Case request and consent for laparoscopic cholecystectomy possible open procedure by Dr. Razo risks, benefits, alternatives discussed   IC green on call to OR            Electronically signed by TIFFANY Owusu, 07/17/23, 2:41 PM EDT.

## 2023-07-17 NOTE — ED TRIAGE NOTES
Pt arrives to Ed with c/o: abd and back pain. Constantly in pain. Starts in abd and radiates to back. Urinary frequency noted. Hx of kidney stones. SOA noted. Difficulty catching a full breath. Ibuprofen taken around 0450. No nausea or vomiting.

## 2023-07-18 ENCOUNTER — APPOINTMENT (OUTPATIENT)
Dept: GENERAL RADIOLOGY | Facility: HOSPITAL | Age: 58
End: 2023-07-18
Payer: COMMERCIAL

## 2023-07-18 PROBLEM — Z90.49 S/P LAPAROSCOPIC CHOLECYSTECTOMY: Status: ACTIVE | Noted: 2023-07-18

## 2023-07-18 LAB
ALBUMIN SERPL-MCNC: 4 G/DL (ref 3.5–5.2)
ALBUMIN/GLOB SERPL: 1.4 G/DL
ALP SERPL-CCNC: 133 U/L (ref 39–117)
ALT SERPL W P-5'-P-CCNC: 526 U/L (ref 1–33)
ANION GAP SERPL CALCULATED.3IONS-SCNC: 10.5 MMOL/L (ref 5–15)
AST SERPL-CCNC: 505 U/L (ref 1–32)
BASOPHILS # BLD AUTO: 0.02 10*3/MM3 (ref 0–0.2)
BASOPHILS NFR BLD AUTO: 0.2 % (ref 0–1.5)
BILIRUB SERPL-MCNC: 0.9 MG/DL (ref 0–1.2)
BUN SERPL-MCNC: 13 MG/DL (ref 6–20)
BUN/CREAT SERPL: 14.6 (ref 7–25)
CALCIUM SPEC-SCNC: 9.2 MG/DL (ref 8.6–10.5)
CHLORIDE SERPL-SCNC: 105 MMOL/L (ref 98–107)
CO2 SERPL-SCNC: 24.5 MMOL/L (ref 22–29)
CREAT SERPL-MCNC: 0.89 MG/DL (ref 0.57–1)
DEPRECATED RDW RBC AUTO: 43 FL (ref 37–54)
EGFRCR SERPLBLD CKD-EPI 2021: 76.2 ML/MIN/1.73
EOSINOPHIL # BLD AUTO: 0 10*3/MM3 (ref 0–0.4)
EOSINOPHIL NFR BLD AUTO: 0 % (ref 0.3–6.2)
ERYTHROCYTE [DISTWIDTH] IN BLOOD BY AUTOMATED COUNT: 12.9 % (ref 12.3–15.4)
GLOBULIN UR ELPH-MCNC: 2.8 GM/DL
GLUCOSE SERPL-MCNC: 126 MG/DL (ref 65–99)
HCT VFR BLD AUTO: 39.4 % (ref 34–46.6)
HGB BLD-MCNC: 13.2 G/DL (ref 12–15.9)
IMM GRANULOCYTES # BLD AUTO: 0.02 10*3/MM3 (ref 0–0.05)
IMM GRANULOCYTES NFR BLD AUTO: 0.2 % (ref 0–0.5)
INR PPP: 1.03 (ref 0.86–1.15)
LYMPHOCYTES # BLD AUTO: 0.72 10*3/MM3 (ref 0.7–3.1)
LYMPHOCYTES NFR BLD AUTO: 8.6 % (ref 19.6–45.3)
MAGNESIUM SERPL-MCNC: 1.7 MG/DL (ref 1.6–2.6)
MCH RBC QN AUTO: 30.7 PG (ref 26.6–33)
MCHC RBC AUTO-ENTMCNC: 33.5 G/DL (ref 31.5–35.7)
MCV RBC AUTO: 91.6 FL (ref 79–97)
MONOCYTES # BLD AUTO: 0.19 10*3/MM3 (ref 0.1–0.9)
MONOCYTES NFR BLD AUTO: 2.3 % (ref 5–12)
NEUTROPHILS NFR BLD AUTO: 7.38 10*3/MM3 (ref 1.7–7)
NEUTROPHILS NFR BLD AUTO: 88.7 % (ref 42.7–76)
NRBC BLD AUTO-RTO: 0 /100 WBC (ref 0–0.2)
PLATELET # BLD AUTO: 215 10*3/MM3 (ref 140–450)
PMV BLD AUTO: 11.4 FL (ref 6–12)
POTASSIUM SERPL-SCNC: 3.7 MMOL/L (ref 3.5–5.2)
PROT SERPL-MCNC: 6.8 G/DL (ref 6–8.5)
PROTHROMBIN TIME: 13.6 SECONDS (ref 11.8–14.9)
RBC # BLD AUTO: 4.3 10*6/MM3 (ref 3.77–5.28)
SODIUM SERPL-SCNC: 140 MMOL/L (ref 136–145)
WBC NRBC COR # BLD: 8.33 10*3/MM3 (ref 3.4–10.8)

## 2023-07-18 PROCEDURE — C1889 IMPLANT/INSERT DEVICE, NOC: HCPCS | Performed by: INTERNAL MEDICINE

## 2023-07-18 PROCEDURE — G0378 HOSPITAL OBSERVATION PER HR: HCPCS

## 2023-07-18 PROCEDURE — 94799 UNLISTED PULMONARY SVC/PX: CPT

## 2023-07-18 PROCEDURE — 83735 ASSAY OF MAGNESIUM: CPT | Performed by: SURGERY

## 2023-07-18 PROCEDURE — C1769 GUIDE WIRE: HCPCS | Performed by: INTERNAL MEDICINE

## 2023-07-18 PROCEDURE — 43264 ERCP REMOVE DUCT CALCULI: CPT | Performed by: INTERNAL MEDICINE

## 2023-07-18 PROCEDURE — 99024 POSTOP FOLLOW-UP VISIT: CPT | Performed by: NURSE PRACTITIONER

## 2023-07-18 PROCEDURE — 43274 ERCP DUCT STENT PLACEMENT: CPT | Performed by: INTERNAL MEDICINE

## 2023-07-18 PROCEDURE — 25010000002 PIPERACILLIN SOD-TAZOBACTAM PER 1 G: Performed by: FAMILY MEDICINE

## 2023-07-18 PROCEDURE — 85025 COMPLETE CBC W/AUTO DIFF WBC: CPT | Performed by: SURGERY

## 2023-07-18 PROCEDURE — C2625 STENT, NON-COR, TEM W/DEL SY: HCPCS | Performed by: INTERNAL MEDICINE

## 2023-07-18 PROCEDURE — 85610 PROTHROMBIN TIME: CPT | Performed by: SURGERY

## 2023-07-18 PROCEDURE — 25010000002 PIPERACILLIN SOD-TAZOBACTAM PER 1 G: Performed by: SURGERY

## 2023-07-18 PROCEDURE — 25010000002 MORPHINE PER 10 MG: Performed by: SURGERY

## 2023-07-18 PROCEDURE — 76000 FLUOROSCOPY <1 HR PHYS/QHP: CPT

## 2023-07-18 PROCEDURE — 80053 COMPREHEN METABOLIC PANEL: CPT | Performed by: SURGERY

## 2023-07-18 PROCEDURE — 94761 N-INVAS EAR/PLS OXIMETRY MLT: CPT

## 2023-07-18 DEVICE — BILIARY STENT
Type: IMPLANTABLE DEVICE | Site: BILE DUCT | Status: FUNCTIONAL
Brand: ADVANIX™ BILIARY

## 2023-07-18 RX ORDER — NICOTINE 21 MG/24HR
1 PATCH, TRANSDERMAL 24 HOURS TRANSDERMAL EVERY 24 HOURS
Status: DISCONTINUED | OUTPATIENT
Start: 2023-07-18 | End: 2023-07-19 | Stop reason: HOSPADM

## 2023-07-18 RX ORDER — TRAZODONE HYDROCHLORIDE 50 MG/1
50 TABLET ORAL NIGHTLY PRN
Status: DISCONTINUED | OUTPATIENT
Start: 2023-07-18 | End: 2023-07-19 | Stop reason: HOSPADM

## 2023-07-18 RX ORDER — INDOMETHACIN 100 MG
100 SUPPOSITORY, RECTAL RECTAL ONCE
Status: COMPLETED | OUTPATIENT
Start: 2023-07-18 | End: 2023-07-18

## 2023-07-18 RX ORDER — SODIUM CHLORIDE, SODIUM LACTATE, POTASSIUM CHLORIDE, CALCIUM CHLORIDE 600; 310; 30; 20 MG/100ML; MG/100ML; MG/100ML; MG/100ML
30 INJECTION, SOLUTION INTRAVENOUS CONTINUOUS
Status: DISCONTINUED | OUTPATIENT
Start: 2023-07-18 | End: 2023-07-19

## 2023-07-18 RX ADMIN — MORPHINE SULFATE 4 MG: 4 INJECTION, SOLUTION INTRAMUSCULAR; INTRAVENOUS at 19:38

## 2023-07-18 RX ADMIN — Medication 10 ML: at 22:01

## 2023-07-18 RX ADMIN — SODIUM CHLORIDE, POTASSIUM CHLORIDE, SODIUM LACTATE AND CALCIUM CHLORIDE 30 ML/HR: 600; 310; 30; 20 INJECTION, SOLUTION INTRAVENOUS at 14:44

## 2023-07-18 RX ADMIN — Medication 100 MG: at 14:45

## 2023-07-18 RX ADMIN — SODIUM CHLORIDE, POTASSIUM CHLORIDE, SODIUM LACTATE AND CALCIUM CHLORIDE 100 ML/HR: 600; 310; 30; 20 INJECTION, SOLUTION INTRAVENOUS at 17:54

## 2023-07-18 RX ADMIN — PIPERACILLIN AND TAZOBACTAM 3.38 G: 3; .375 INJECTION, POWDER, LYOPHILIZED, FOR SOLUTION INTRAVENOUS at 13:01

## 2023-07-18 RX ADMIN — MORPHINE SULFATE 4 MG: 4 INJECTION, SOLUTION INTRAMUSCULAR; INTRAVENOUS at 03:17

## 2023-07-18 RX ADMIN — TRAZODONE HYDROCHLORIDE 50 MG: 50 TABLET ORAL at 21:59

## 2023-07-18 RX ADMIN — MORPHINE SULFATE 4 MG: 4 INJECTION, SOLUTION INTRAMUSCULAR; INTRAVENOUS at 11:37

## 2023-07-18 RX ADMIN — PIPERACILLIN AND TAZOBACTAM 3.38 G: 3; .375 INJECTION, POWDER, LYOPHILIZED, FOR SOLUTION INTRAVENOUS at 21:59

## 2023-07-18 RX ADMIN — PIPERACILLIN AND TAZOBACTAM 3.38 G: 3; .375 INJECTION, POWDER, LYOPHILIZED, FOR SOLUTION INTRAVENOUS at 05:36

## 2023-07-18 RX ADMIN — NICOTINE 1 PATCH: 21 PATCH, EXTENDED RELEASE TRANSDERMAL at 13:01

## 2023-07-18 NOTE — PLAN OF CARE
Problem: Adult Inpatient Plan of Care  Goal: Plan of Care Review  Outcome: Ongoing, Progressing  Flowsheets (Taken 7/18/2023 1537)  Plan of Care Reviewed With: patient  Outcome Evaluation: PT NPO throughout shift. VSS. Pt up ad magui. Pt  c/o pain in back and abdomen this shift, see MAR. IV abx per order. Pt down for procedure this afternoon. Family visited this shift. Nicotine patch placed. Yee Kumar RN.  Goal: Patient-Specific Goal (Individualized)  Outcome: Ongoing, Progressing  Goal: Absence of Hospital-Acquired Illness or Injury  Outcome: Ongoing, Progressing  Intervention: Identify and Manage Fall Risk  Description: Perform standard risk assessment on admission using a validated tool or comprehensive approach appropriate to the patient; reassess fall risk frequently, with change in status or transfer to another level of care.  Communicate fall injury risk to interprofessional healthcare team.  Determine need for increased observation, equipment and environmental modification, such as low bed, signage and supportive, nonskid footwear.  Adjust safety measures to individual developmental age, stage and identified risk factors.  Reinforce the importance of safety and physical activity with patient and family.  Perform regular intentional rounding to assess need for position change, pain assessment and personal needs, including assistance with toileting.  Recent Flowsheet Documentation  Taken 7/18/2023 0701 by Yee Kumar, RN  Safety Promotion/Fall Prevention: safety round/check completed  Intervention: Prevent Skin Injury  Description: Perform a screening for skin injury risk, such as pressure or moisture associated skin damage on admission and at regular intervals throughout hospital stay.  Keep all areas of skin (especially folds) clean and dry.  Maintain adequate skin hydration.  Relieve and redistribute pressure and protect bony prominences; implement measures based on patient-specific risk  factors.  Match turning and repositioning schedule to clinical condition.  Encourage weight shift frequently; assist with reposition if unable to complete independently.  Float heels off bed; avoid pressure on the Achilles tendon.  Keep skin free from extended contact with medical devices.  Encourage functional activity and mobility, as early as tolerated.  Use aids (e.g., slide boards, mechanical lift) during transfer.  Recent Flowsheet Documentation  Taken 7/18/2023 0701 by Yee Kumar RN  Body Position: position changed independently  Goal: Optimal Comfort and Wellbeing  Outcome: Ongoing, Progressing  Intervention: Monitor Pain and Promote Comfort  Description: Assess pain level, treatment efficacy and patient response at regular intervals using a consistent pain scale.  Consider the presence and impact of preexisting chronic pain.  Encourage patient and caregiver involvement in pain assessment, interventions and safety measures.  Recent Flowsheet Documentation  Taken 7/18/2023 1138 by Yee Kumar, RN  Pain Management Interventions: see MAR  Intervention: Provide Person-Centered Care  Description: Use a family-focused approach to care.  Develop trust and rapport by proactively providing information, encouraging questions, addressing concerns and offering reassurance.  Acknowledge emotional response to hospitalization.  Recognize and utilize personal coping strategies.  Honor spiritual and cultural preferences.  Recent Flowsheet Documentation  Taken 7/18/2023 0701 by Yee Kumar, RN  Trust Relationship/Rapport:   care explained   choices provided   emotional support provided   empathic listening provided   questions encouraged   questions answered   reassurance provided   thoughts/feelings acknowledged  Goal: Readiness for Transition of Care  Outcome: Ongoing, Progressing   Goal Outcome Evaluation:  Plan of Care Reviewed With: patient           Outcome Evaluation: PT NPO throughout shift. VSS. Pt up  ad magui. Pt  c/o pain in back and abdomen this shift, see MAR. IV abx per order. Pt down for procedure this afternoon. Family visited this shift. Nicotine patch placed. Yee Kumar RN.

## 2023-07-18 NOTE — PROGRESS NOTES
[facility] Hospitalist Progress Note  Date: 2023  Patient Name: Vita Cuevas  : 1965  MRN: [id number]  Date of admission: 2023      Subjective   Subjective     Chief Complaint: Follow-up abdominal pain    Summary:Vita Cuevas is a 57 y.o. female past medical history of hypertension, anxiety, and PTSD who presented with right upper quadrant pain started on the morning of presentation.  No fevers or chills.  Pain became debilitating patient presented the emergency department for further evaluation and treatment.     In the emergency department: Patient's vital signs were as follows temperature 98.4, pulse 55, respiratory is 20, blood pressure 120/60, and a percent room air ECG shows no abnormalities.  CMP shows a glucose of 120 no other abnormalities.  Normal lactate.  Urinalysis was bland.  X-ray shows no abnormalities.  CT of the abdomen pelvis shows gallbladder distention as well as small mount of pericholecystic fluid raising the possibility of acute cholecystitis and there is mild biliary duct dilation with 9 mm.  MRCP shows cholelithiasis distended gallbladder with pericholecystic fluid concerning for gallbladder and dilated common bile duct of 9 mm.  Questionable 3 mm stone but this could be due to motion artifact.  Emergency department physician contacted GI at [place] but did not feel that the CBD was actually dilated and recommended general surgery to remove gallbladder and if there was a stone on interoperative cholangiogram then would need GI to do an ERCP at that time.  Patient admitted to the hospital for further management of acute cholecystitis concern for possible choledocholithiasis.  Patient underwent laparoscopic cholecystectomy.  Intraoperative cholangiogram indeed concerning for choledocholithiasis.  LFTs trended up sharply.  Patient underwent ERCP.  Sphincterotomy performed.  Sludge was swept from the common bile duct.  Temporary stent placed in the  common bile duct.  Patient will need repeat ERCP in 4 weeks for stent removal.    Interval Followup: Patient seen and evaluated prior to ERCP.  Patient still endorsing some right upper quadrant abdominal pain denies nausea or vomiting.  Afebrile overnight.  Heart rate within normal limits.  Blood pressure within normal limits.  Satting well on room air.  ALT AST trending up sharply greater than 500.  No other issues per nursing.    Review of Systems  Constitutional: Negative for fatigue and fever.   HENT: Negative for sore throat and trouble swallowing.    Eyes: Negative for pain and discharge.   Respiratory: Negative for cough and shortness of breath.    Cardiovascular: Negative for chest pain and palpitations.   Gastrointestinal: Positive for abdominal pain, negative nausea and vomiting.   Endocrine: Negative for cold intolerance and heat intolerance.   Genitourinary: Negative for difficulty urinating and dysuria.   Musculoskeletal: Negative for back pain and neck stiffness.   Skin: Negative for color change and rash.   Neurological: Negative for syncope and headaches.   Hematological: Negative for adenopathy.   Psychiatric/Behavioral: Negative for confusion and hallucinations.    Objective   Objective     Vitals:   Temp:  [97.3 °F (36.3 °C)-98 °F (36.7 °C)] 97.3 °F (36.3 °C)  Heart Rate:  [51-77] 63  Resp:  [12-18] 16  BP: (108-150)/(61-95) 150/69  Flow (L/min):  [0] 0  Physical Exam   Gen. well-developed appearing stated age in no acute distress  HEENT: Normocephalic atraumatic moist membranes pupils equal round reactive light, no scleral icterus no conjunctival injection  Cardiovascular: regular rate and rhythm no murmurs rubs or gallops S1-S2, no lower extremity edema appreciated  Pulmonary: Clear to auscultation bilaterally no wheezes rales or rhonchi symmetric chest expansion, unlabored, no conversational dyspnea appreciated  Gastrointestinal: Soft, tender in the epigastrium and right upper quadrant,  nondistended positive bowel sounds all 4 quadrants no rebound or guarding  Musculoskeletal: No clubbing cyanosis, warm and well-perfused, calves soft symmetric nontender bilaterally  Skin: Clean dry without rashs  Neuro: Cranial nerves II through XII intact grossly no sensorimotor deficits appreciated bilateral upper and lower extremities  Psych: Patient is calm cooperative and appropriate with exam not responding to internal stimuli  : No Vale catheter no bladder distention no suprapubic tenderness    Result Review    Result Review:  I have personally reviewed these results and agree with these findings:  [x]  Laboratory  LAB RESULTS:      Lab 07/18/23  0522 07/17/23  0607   WBC 8.33 8.49   HEMOGLOBIN 13.2 13.9   HEMATOCRIT 39.4 40.7   PLATELETS 215 224   NEUTROS ABS 7.38* 2.58   IMMATURE GRANS (ABS) 0.02 0.02   LYMPHS ABS 0.72 4.99*   MONOS ABS 0.19 0.56   EOS ABS 0.00 0.27   MCV 91.6 88.9   LACTATE  --  1.8   PROTIME 13.6  --          Lab 07/18/23  0522 07/17/23  0607   SODIUM 140 143   POTASSIUM 3.7 3.5   CHLORIDE 105 107   CO2 24.5 24.6   ANION GAP 10.5 11.4   BUN 13 14   CREATININE 0.89 0.80   EGFR 76.2 86.6   GLUCOSE 126* 120*   CALCIUM 9.2 9.8   MAGNESIUM 1.7  --          Lab 07/18/23  0522 07/17/23  0607   TOTAL PROTEIN 6.8 7.2   ALBUMIN 4.0 4.4   GLOBULIN 2.8 2.8   ALT (SGPT) 526* 16   AST (SGOT) 505* 18   BILIRUBIN 0.9 0.3   ALK PHOS 133* 87   LIPASE  --  27         Lab 07/18/23  0522   PROTIME 13.6   INR 1.03                 Brief Urine Lab Results  (Last result in the past 365 days)        Color   Clarity   Blood   Leuk Est   Nitrite   Protein   CREAT   Urine HCG        07/17/23 0802 Yellow   Clear   Negative   Negative   Negative   Negative                 Microbiology Results (last 10 days)       Procedure Component Value - Date/Time    Blood Culture - Blood, Arm, Left [[id number]]  (Normal) Collected: 07/17/23 1406    Lab Status: Preliminary result Specimen: Blood from Arm, Left Updated: 07/18/23  1415     Blood Culture No growth at 24 hours    Blood Culture - Blood, Arm, Left [[id number]]  (Normal) Collected: 07/17/23 1401    Lab Status: Preliminary result Specimen: Blood from Arm, Left Updated: 07/18/23 1415     Blood Culture No growth at 24 hours            [x]  Microbiology  [x]  Radiology  CT Abdomen Pelvis With Contrast    Result Date: 7/17/2023    1. There is gallbladder distention as well as a small amount of pericholecystic fluid, raising the possibility of acute cholecystitis. 2. There is mild biliary duct dilation measuring 9 mm, cannot exclude a choledocholithiasis.     DAYANARA JIMENEZ MD       Electronically Signed and Approved By: DAYANARA JIMENEZ MD on 7/17/2023 at 8:56             XR Chest 1 View    Result Date: 7/17/2023    No acute cardiopulmonary process.        FAISAL RIVERA MD       Electronically Signed and Approved By: FAISAL RIVERA MD on 7/17/2023 at 9:20              []  EKG/Telemetry   []  Cardiology/Vascular   []  Pathology  []  Old records  [x]  Other:  Scheduled Meds:nicotine, 1 patch, Transdermal, Q24H  piperacillin-tazobactam, 3.375 g, Intravenous, Q8H  sodium chloride, 10 mL, Intravenous, Q12H      Continuous Infusions:lactated ringers, 100 mL/hr, Last Rate: 100 mL/hr (07/18/23 1754)  lactated ringers, 30 mL/hr, Last Rate: 30 mL/hr (07/18/23 1444)      PRN Meds:.  Morphine **AND** naloxone    ondansetron    sodium chloride    [COMPLETED] Insert Peripheral IV **AND** sodium chloride    sodium chloride    sodium chloride    traZODone      Assessment & Plan   Assessment / Plan     Assessment/Plan:  Acute cholecystitis  Transaminitis  Sludge of the common bile duct        Patient admitted for further evaluation and treatment  General surgery and gastroenterology consulted thank you for your assistance  Continue Zosyn empirically can discontinue post ERCP  Start clear liquid diet and advance per GI  Continue pain control with IV analgesics  Continue to monitor transaminitis  Patient will  need repeat ERCP in 4 weeks for stent removal  Further inpatient orders recommendations pending clinical course         Discussed plan with bedside RN.    Disposition: Home once tolerating a diet to follow-up with gastroenterology and general surgery as an outpatient.    DVT prophylaxis:  No DVT prophylaxis order currently exists.    CODE STATUS:   Level Of Support Discussed With: Patient  Code Status (Patient has no pulse and is not breathing): CPR (Attempt to Resuscitate)  Medical Interventions (Patient has pulse or is breathing): Full Support

## 2023-07-18 NOTE — CONSULTS
SW completed living will with patient at the bedside. Patient has designated her son Bridgette Avila, as her primary health care surrogate. She has also designated her sister Kimberlee Khan, as her secondary surrogate. Patient does specifically states that she would like for both Bridgette and Kimberlee to determine if treatment should or should not be withheld or withdrawn, together.  specifically wrote this in living will. SW gave patient multiple copy's, and placed copy in medical records.

## 2023-07-18 NOTE — PROGRESS NOTES
"POST OP PROGRESS NOTE     Patient Name:  Vita Cuevas  YOB: 1965  [id number]   LOS: 0 days   Day of Surgery  Patient Care Team:  Thierno Li MD as PCP - General (Internal Medicine)          Subjective     Interval History:  VSS, afebrile, still with RUQ abd pain, to have ERCP this afternoon      Review of Systems:    A complete review of systems was performed and all are negative except what is documented in the HPI.       Objective     Constitutional:  well nourished, no acute distress, appear stated age /72 (BP Location: Left arm, Patient Position: Lying)   Pulse 68   Temp 98 °F (36.7 °C) (Oral)   Resp 18   Ht 162.6 cm (64\")   Wt 88.6 kg (195 lb 5.2 oz)   SpO2 96%   BMI 33.53 kg/m²    Eyes:  anicteric sclerae, moist conjunctivae, no lid lag, PERRLA  ENMT:  oropharynx clear, moist mucous membranes, good dentition  Neck:   full ROM, trachea midline, no thyromegaly  Cardiovascular: RRR, S1 and S2 present, no MRG, heart rate 68, no pedal edema  Respiratory: lungs CTA, respirations even and unlabored  GI:  Abdomen soft, approp tender, nondistended, no HSM     Skin:  warm and dry, normal turgor, no rashes  Psychiatric:  alert and oriented x3, intact judgment and insight, cooperative          Results Review:       I reviewed the patient's new clinical results including  CBC and CMP.     WBC   Date Value Ref Range Status   07/18/2023 8.33 3.40 - 10.80 10*3/mm3 Final     RBC   Date Value Ref Range Status   07/18/2023 4.30 3.77 - 5.28 10*6/mm3 Final     Hemoglobin   Date Value Ref Range Status   07/18/2023 13.2 12.0 - 15.9 g/dL Final     Hematocrit   Date Value Ref Range Status   07/18/2023 39.4 34.0 - 46.6 % Final     MCV   Date Value Ref Range Status   07/18/2023 91.6 79.0 - 97.0 fL Final     MCH   Date Value Ref Range Status   07/18/2023 30.7 26.6 - 33.0 pg Final     MCHC   Date Value Ref Range Status   07/18/2023 33.5 31.5 - 35.7 g/dL Final     RDW   Date Value Ref Range Status "   07/18/2023 12.9 12.3 - 15.4 % Final     RDW-SD   Date Value Ref Range Status   07/18/2023 43.0 37.0 - 54.0 fl Final     MPV   Date Value Ref Range Status   07/18/2023 11.4 6.0 - 12.0 fL Final     Platelets   Date Value Ref Range Status   07/18/2023 215 140 - 450 10*3/mm3 Final     Neutrophil %   Date Value Ref Range Status   07/18/2023 88.7 (H) 42.7 - 76.0 % Final     Lymphocyte %   Date Value Ref Range Status   07/18/2023 8.6 (L) 19.6 - 45.3 % Final     Monocyte %   Date Value Ref Range Status   07/18/2023 2.3 (L) 5.0 - 12.0 % Final     Eosinophil %   Date Value Ref Range Status   07/18/2023 0.0 (L) 0.3 - 6.2 % Final     Basophil %   Date Value Ref Range Status   07/18/2023 0.2 0.0 - 1.5 % Final     Immature Grans %   Date Value Ref Range Status   07/18/2023 0.2 0.0 - 0.5 % Final     Neutrophils, Absolute   Date Value Ref Range Status   07/18/2023 7.38 (H) 1.70 - 7.00 10*3/mm3 Final     Lymphocytes, Absolute   Date Value Ref Range Status   07/18/2023 0.72 0.70 - 3.10 10*3/mm3 Final     Monocytes, Absolute   Date Value Ref Range Status   07/18/2023 0.19 0.10 - 0.90 10*3/mm3 Final     Eosinophils, Absolute   Date Value Ref Range Status   07/18/2023 0.00 0.00 - 0.40 10*3/mm3 Final     Basophils, Absolute   Date Value Ref Range Status   07/18/2023 0.02 0.00 - 0.20 10*3/mm3 Final     Immature Grans, Absolute   Date Value Ref Range Status   07/18/2023 0.02 0.00 - 0.05 10*3/mm3 Final     nRBC   Date Value Ref Range Status   07/18/2023 0.0 0.0 - 0.2 /100 WBC Final         Basic Metabolic Panel    Sodium Sodium   Date Value Ref Range Status   07/18/2023 140 136 - 145 mmol/L Final   07/17/2023 143 136 - 145 mmol/L Final      Potassium Potassium   Date Value Ref Range Status   07/18/2023 3.7 3.5 - 5.2 mmol/L Final   07/17/2023 3.5 3.5 - 5.2 mmol/L Final      Chloride Chloride   Date Value Ref Range Status   07/18/2023 105 98 - 107 mmol/L Final   07/17/2023 107 98 - 107 mmol/L Final      Bicarbonate No results found for:  PLASMABICARB   BUN BUN   Date Value Ref Range Status   07/18/2023 13 6 - 20 mg/dL Final   07/17/2023 14 6 - 20 mg/dL Final      Creatinine Creatinine   Date Value Ref Range Status   07/18/2023 0.89 0.57 - 1.00 mg/dL Final   07/17/2023 0.80 0.57 - 1.00 mg/dL Final      Calcium Calcium   Date Value Ref Range Status   07/18/2023 9.2 8.6 - 10.5 mg/dL Final   07/17/2023 9.8 8.6 - 10.5 mg/dL Final      Glucose      No components found for: GLUCOSE.*       Lab Results   Component Value Date    GLUCOSE 126 (H) 07/18/2023    BUN 13 07/18/2023    CREATININE 0.89 07/18/2023    EGFR 76.2 07/18/2023    BCR 14.6 07/18/2023    K 3.7 07/18/2023    CO2 24.5 07/18/2023    CALCIUM 9.2 07/18/2023    ALBUMIN 4.0 07/18/2023    BILITOT 0.9 07/18/2023     (H) 07/18/2023     (H) 07/18/2023       IMAGING:  Imaging Results (Last 72 Hours)       Procedure Component Value Units Date/Time    FL Surgery Fluoro [[id number]] Resulted: 07/17/23 1750     Updated: 07/17/23 1750    Narrative:      This procedure was auto-finalized with no dictation required.    MRI abdomen wo contrast mrcp [[id number]] Collected: 07/17/23 1157     Updated: 07/17/23 1200    Narrative:      PROCEDURE: MRI ABDOMEN WO CONTRAST MRCP     COMPARISON: [facility], CT, CT ABDOMEN PELVIS W CONTRAST, 7/17/2023, 8:27.     INDICATIONS: Abdominal pain, CBD 9 mm on CT scan. Gallbladder distention, mild biliary ductal   dilatation.             TECHNIQUE: MRI of the abdomen without intravenous contrast. MRCP without contrast. 3-D MIP  images   were performed.     ABDOMEN:  There are a few hepatic cysts measuring up to 1.5 cm.  There is a 2 cm right renal cyst.    The left kidney is normal.  The spleen, pancreas and adrenal glands are normal.  The abdominal   aorta has a normal caliber.       MRCP:  Motion artifact is present.  The gallbladder is distended.  There are tiny stones in the   gallbladder.  Pericholecystic fluid/inflammatory change is present.  There  is no pancreatic ductal   dilatation.  The common bile duct is dilated measuring 9 mm.  Questionable 3 mm stone in the distal   common bile duct.       IMPRESSION:      1. Cholelithiasis.  Distended gallbladder with pericholecystic fluid/inflammatory change suspicious   for acute cholecystitis.     2. Dilated common bile duct measuring 9 mm.  Questionable 3 mm stone in the distal common bile   duct.  The images are obscured by motion artifact.       ALBERTO CHRISTINA MD         Electronically Signed and Approved By: ALBERTO CHRISTINA MD on 7/17/2023 at 11:57                     XR Chest 1 View [[id number]] Collected: 07/17/23 0920     Updated: 07/17/23 0923    Narrative:      PROCEDURE: XR CHEST 1 VW     COMPARISON: [facility], CR, CHEST AP/PA 1 VIEW, 4/28/2016, 20:36.     INDICATIONS: SOB, CHEST PAIN, BACK PAIN     FINDINGS:   The lungs are adequately expanded. There is no focal consolidation, pneumothorax, or obvious   pleural effusion. The pulmonary vasculature appears within normal limits. The cardiac and   mediastinal contours are within normal limits. The osseous structures appear intact.       Impression:         No acute cardiopulmonary process.                      FAISAL RIVERA MD         Electronically Signed and Approved By: FAISAL RIVERA MD on 7/17/2023 at 9:20                     CT Abdomen Pelvis With Contrast [[id number]] Collected: 07/17/23 0857     Updated: 07/17/23 0900    Narrative:      PROCEDURE: CT ABDOMEN PELVIS W CONTRAST     COMPARISON: [facility], CT, ABD PEL W/O CONTRAST, 7/21/2013, 4:25.     INDICATIONS: Abdominal and bilateral flank pain     TECHNIQUE: After obtaining the patient's consent, CT images were created with non-ionic intravenous   contrast material.       PROTOCOL:   Standard imaging protocol performed      RADIATION:   DLP: 942.6mGy*cm    Automated exposure control was utilized to minimize radiation dose.   CONTRAST: 100cc Isovue 370 I.V.      FINDINGS:   Limited views of the lung bases are unremarkable.  There are scattered cysts within the liver.  The   portal vasculature is patent.  Common bile duct is dilated measuring 9 mm.  There is mild   distension and stranding surrounding the gallbladder, which could reflect cholecystitis.  The   pancreas is unremarkable.  Spleen is normal.     Bilateral adrenal glands are normal.  There is a simple right renal cyst.  The left kidney is   unremarkable.  The bladder is unremarkable.  Fibroid uterus.     There is diverticulosis without evidence of diverticulitis identified.  The appendix is normal.    The small bowel is unremarkable.     No adenopathy is identified.  Scattered atherosclerotic plaque.  No aggressive appearing lytic or   sclerotic bone lesions are identified.       Impression:         1. There is gallbladder distention as well as a small amount of pericholecystic fluid, raising the   possibility of acute cholecystitis.  2. There is mild biliary duct dilation measuring 9 mm, cannot exclude a choledocholithiasis.            DAYANARA JIMENEZ MD         Electronically Signed and Approved By: DAYANARA JIMENEZ MD on 7/17/2023 at 8:56                             Medications:    Current Facility-Administered Medications:     lactated ringers infusion, 100 mL/hr, Intravenous, Continuous, Marbella Razo MD, Last Rate: 100 mL/hr at 07/17/23 1648, New Bag at 07/17/23 1800    morphine injection 4 mg, 4 mg, Intravenous, Q3H PRN, 4 mg at 07/18/23 1137 **AND** naloxone (NARCAN) injection 0.4 mg, 0.4 mg, Intravenous, Q5 Min PRN, Marbella Razo MD    nicotine (NICODERM CQ) 21 MG/24HR patch 1 patch, 1 patch, Transdermal, Q24H, Jose Luis Sebastian MD, 1 patch at 07/18/23 1301    ondansetron (ZOFRAN) injection 4 mg, 4 mg, Intravenous, Q6H PRN, Marbella Razo MD, 4 mg at 07/17/23 1452    Pharmacy to Dose Zosyn, , Does not apply, Continuous PRN, Marbella Razo MD    piperacillin-tazobactam (ZOSYN) 3.375  g/100 mL 0.9% NS IVPB (mbp), 3.375 g, Intravenous, Q8H, Marbella Razo MD, 3.375 g at 07/18/23 1301    sodium chloride 0.9 % flush 10 mL, 10 mL, Intravenous, PRN, Marbella Razo MD    [COMPLETED] Insert Peripheral IV, , , Once **AND** sodium chloride 0.9 % flush 10 mL, 10 mL, Intravenous, PRN, Marbella Razo MD    sodium chloride 0.9 % flush 10 mL, 10 mL, Intravenous, Q12H, Marbella Razo MD    sodium chloride 0.9 % flush 10 mL, 10 mL, Intravenous, PRN, Marbella Razo MD    sodium chloride 0.9 % infusion 40 mL, 40 mL, Intravenous, PRGARY, Marbella Razo MD    Assessment & Plan       Acute cholecystitis    Calculus of gallbladder    Choledocholithiasis with acute cholecystitis  S/p lap robson   -may advance diet after ERCP   -CBC and CMP in AM          Electronically signed by Michela Ray, TIFFANY, 07/18/23, 1:12 PM EDT.

## 2023-07-18 NOTE — CONSULTS
Chief Complaint  Abdominal Pain and Flank Pain    History of Present Illness  Vita Cuevas is a 57 y.o. female with history of anxiety, gallbladder stones, gallbladder surgery and intraoperative cholangiogram which was abnormal.  Endometriosis, kidney stones, insomnia, PTSD who presents to [facility] FLOOR [facility] on referral from No ref. provider found for a gastroenterology evaluation of abnormal MRCP, abnormal intraoperative cholangiogram and elevated liver enzymes.  Patient came in the hospital with biliary colic with pain in which was epigastric in occasion.  Pain was achy in nature.  6-7 out of 10 intensity.  There was some nausea vomiting associated with it.  Pain was radiating to the back        Labs Result Review Imaging    Past Medical History:   Diagnosis Date    Anxiety     Calculus of gallbladder 2023    Endometriosis     Hypertension     Kidney stone     Other insomnia     PTSD (post-traumatic stress disorder)        Past Surgical History:   Procedure Laterality Date     SECTION      x3    CHOLECYSTECTOMY N/A 2023    Procedure: CHOLECYSTECTOMY LAPAROSCOPIC, INTRAOPERATIVE CHOLANGIOGRAM;  Surgeon: Marbella Razo MD;  Location: [facility];  Service: General;  Laterality: N/A;    KIDNEY STONE SURGERY      OTHER SURGICAL HISTORY      Endometriosis         Current Facility-Administered Medications:     lactated ringers infusion, 100 mL/hr, Intravenous, Continuous, Marbella Razo MD, Last Rate: 100 mL/hr at 23 1648, New Bag at 23 1800    morphine injection 4 mg, 4 mg, Intravenous, Q3H PRN, 4 mg at 23 1137 **AND** naloxone (NARCAN) injection 0.4 mg, 0.4 mg, Intravenous, Q5 Min PRN, Marbella Razo MD    nicotine (NICODERM CQ) 21 MG/24HR patch 1 patch, 1 patch, Transdermal, Q24H, Jose Luis Sebastian MD    ondansetron (ZOFRAN) injection 4 mg, 4 mg, Intravenous, Q6H PRN, Marbella Razo MD, 4 mg at 23 1452     "Pharmacy to Dose Zosyn, , Does not apply, Continuous PRN, Marbella Razo MD    piperacillin-tazobactam (ZOSYN) 3.375 g/100 mL 0.9% NS IVPB (mbp), 3.375 g, Intravenous, Q8H, Marbella Razo MD, 3.375 g at 07/18/23 0536    sodium chloride 0.9 % flush 10 mL, 10 mL, Intravenous, PRN, Marbella Razo MD    [COMPLETED] Insert Peripheral IV, , , Once **AND** sodium chloride 0.9 % flush 10 mL, 10 mL, Intravenous, PRN, Marbella Razo MD    sodium chloride 0.9 % flush 10 mL, 10 mL, Intravenous, Q12H, Marbella Razo MD    sodium chloride 0.9 % flush 10 mL, 10 mL, Intravenous, PRN, Marbella Razo MD    sodium chloride 0.9 % infusion 40 mL, 40 mL, Intravenous, PRGARY, Marbella Razo MD     No Known Allergies    Family History   Adopted: Yes        Social History     Social History Narrative    Not on file   Social history negative smoking, alcohol abuse, drug    Immunization:  Immunization History   Administered Date(s) Administered    FluLaval/Fluzone >6mos 12/30/2022    Pneumococcal Conjugate 20-Valent (PCV20) 12/30/2022    Shingrix 12/30/2022, 06/16/2023    Tdap 12/30/2022        Objective     Review of Systems 10 system review is negative except as mentioned HPI    Vital Signs:   /71 (BP Location: Left arm, Patient Position: Lying)   Pulse 68   Temp 98 °F (36.7 °C) (Oral)   Resp 18   Ht 162.6 cm (64\")   Wt 88.6 kg (195 lb 5.2 oz)   SpO2 96%   BMI 33.53 kg/m²       Physical Exam  Constitutional:       General: She is awake. She is not in acute distress.     Appearance: Normal appearance. She is well-developed and well-groomed.   HENT:      Head: Normocephalic and atraumatic.      Mouth/Throat:      Mouth: Mucous membranes are moist.      Comments: Dev dental hygiene is good  Eyes:      General: Lids are normal.      Conjunctiva/sclera: Conjunctivae normal.      Pupils: Pupils are equal, round, and reactive to light.   Neck:      Thyroid: No thyroid mass.      Trachea: Trachea " normal.   Cardiovascular:      Rate and Rhythm: Normal rate and regular rhythm.      Heart sounds: Normal heart sounds.   Pulmonary:      Effort: Pulmonary effort is normal.      Breath sounds: Normal breath sounds and air entry.   Abdominal:      General: Abdomen is flat. Bowel sounds are normal. There is no distension.      Palpations: Abdomen is soft. There is no mass.      Tenderness: There is no abdominal tenderness. There is no guarding.   Musculoskeletal:      Cervical back: Neck supple.      Right lower leg: No edema.      Left lower leg: No edema.   Skin:     General: Skin is warm and moist.      Coloration: Skin is not cyanotic, jaundiced or pale.      Findings: No rash.      Nails: There is no clubbing.   Neurological:      Mental Status: She is alert and oriented to person, place, and time.   Psychiatric:         Attention and Perception: Attention normal.         Mood and Affect: Mood and affect normal.         Speech: Speech normal.       Labs:  Results from last 7 days   Lab Units 07/18/23  0522 07/17/23  0607   WBC 10*3/mm3 8.33 8.49   HEMOGLOBIN g/dL 13.2 13.9   HEMATOCRIT % 39.4 40.7   PLATELETS 10*3/mm3 215 224      Results from last 7 days   Lab Units 07/18/23  0522 07/17/23  0607   SODIUM mmol/L 140 143   POTASSIUM mmol/L 3.7 3.5   CHLORIDE mmol/L 105 107   CO2 mmol/L 24.5 24.6   BUN mg/dL 13 14   CREATININE mg/dL 0.89 0.80   CALCIUM mg/dL 9.2 9.8   BILIRUBIN mg/dL 0.9 0.3   ALK PHOS U/L 133* 87   ALT (SGPT) U/L 526* 16   AST (SGOT) U/L 505* 18   GLUCOSE mg/dL 126* 120*      Results from last 7 days   Lab Units 07/18/23  0522   INR  1.03        Assessment & Plan:  Principal Problem:    Acute cholecystitis  Active Problems:    Calculus of gallbladder    Choledocholithiasis with acute cholecystitis      Overview: Added automatically from request for surgery [id number]  Plan do ERCP today.  Risks including risk of 5 7% pancreatitis, perforation, bleeding, need for emergency surgery permanent,  permanent damage, long hospital stay, death and other possible complications were discussed with the patient.  He asked questions which were fully answered and then patient requested to proceed with ERCP here at [facility] by me today.    Patient was given instructions and counseling regarding her condition or for health maintenance advice. Please see specific information pulled into the AVS if appropriate.        Signed:  Felix Lee MD  07/18/23  12:48 EDT

## 2023-07-19 ENCOUNTER — PATIENT MESSAGE (OUTPATIENT)
Dept: GASTROENTEROLOGY | Facility: CLINIC | Age: 58
End: 2023-07-19
Payer: COMMERCIAL

## 2023-07-19 ENCOUNTER — PREP FOR SURGERY (OUTPATIENT)
Dept: OTHER | Facility: HOSPITAL | Age: 58
End: 2023-07-19
Payer: COMMERCIAL

## 2023-07-19 VITALS
TEMPERATURE: 97.4 F | HEIGHT: 64 IN | WEIGHT: 195.33 LBS | OXYGEN SATURATION: 98 % | RESPIRATION RATE: 16 BRPM | SYSTOLIC BLOOD PRESSURE: 148 MMHG | DIASTOLIC BLOOD PRESSURE: 82 MMHG | BODY MASS INDEX: 33.35 KG/M2 | HEART RATE: 72 BPM

## 2023-07-19 DIAGNOSIS — Z46.89 ENCOUNTER FOR REMOVAL OF BILIARY STENT: Primary | ICD-10-CM

## 2023-07-19 LAB
ALBUMIN SERPL-MCNC: 3.2 G/DL (ref 3.5–5.2)
ALBUMIN/GLOB SERPL: 1.4 G/DL
ALP SERPL-CCNC: 95 U/L (ref 39–117)
ALT SERPL W P-5'-P-CCNC: 270 U/L (ref 1–33)
ANION GAP SERPL CALCULATED.3IONS-SCNC: 8.2 MMOL/L (ref 5–15)
AST SERPL-CCNC: 128 U/L (ref 1–32)
BILIRUB SERPL-MCNC: 0.5 MG/DL (ref 0–1.2)
BUN SERPL-MCNC: 15 MG/DL (ref 6–20)
BUN/CREAT SERPL: 21.4 (ref 7–25)
CALCIUM SPEC-SCNC: 8.3 MG/DL (ref 8.6–10.5)
CHLORIDE SERPL-SCNC: 108 MMOL/L (ref 98–107)
CO2 SERPL-SCNC: 25.8 MMOL/L (ref 22–29)
CREAT SERPL-MCNC: 0.7 MG/DL (ref 0.57–1)
CYTO UR: NORMAL
EGFRCR SERPLBLD CKD-EPI 2021: 101.6 ML/MIN/1.73
GLOBULIN UR ELPH-MCNC: 2.3 GM/DL
GLUCOSE SERPL-MCNC: 91 MG/DL (ref 65–99)
LAB AP CASE REPORT: NORMAL
LAB AP CLINICAL INFORMATION: NORMAL
PATH REPORT.FINAL DX SPEC: NORMAL
PATH REPORT.GROSS SPEC: NORMAL
POTASSIUM SERPL-SCNC: 2.9 MMOL/L (ref 3.5–5.2)
PROT SERPL-MCNC: 5.5 G/DL (ref 6–8.5)
SODIUM SERPL-SCNC: 142 MMOL/L (ref 136–145)

## 2023-07-19 PROCEDURE — G0378 HOSPITAL OBSERVATION PER HR: HCPCS

## 2023-07-19 PROCEDURE — 80053 COMPREHEN METABOLIC PANEL: CPT | Performed by: FAMILY MEDICINE

## 2023-07-19 PROCEDURE — 25010000002 MORPHINE PER 10 MG: Performed by: SURGERY

## 2023-07-19 PROCEDURE — 0 POTASSIUM CHLORIDE 10 MEQ/100ML SOLUTION: Performed by: FAMILY MEDICINE

## 2023-07-19 PROCEDURE — 99024 POSTOP FOLLOW-UP VISIT: CPT | Performed by: NURSE PRACTITIONER

## 2023-07-19 RX ORDER — HYDROCODONE BITARTRATE AND ACETAMINOPHEN 5; 325 MG/1; MG/1
1 TABLET ORAL EVERY 4 HOURS PRN
Qty: 18 TABLET | Refills: 0 | Status: SHIPPED | OUTPATIENT
Start: 2023-07-19 | End: 2023-07-22

## 2023-07-19 RX ORDER — POTASSIUM CHLORIDE 7.45 MG/ML
10 INJECTION INTRAVENOUS
Status: COMPLETED | OUTPATIENT
Start: 2023-07-19 | End: 2023-07-19

## 2023-07-19 RX ORDER — POLYETHYLENE GLYCOL 3350 17 G/17G
17 POWDER, FOR SOLUTION ORAL DAILY
Status: DISCONTINUED | OUTPATIENT
Start: 2023-07-19 | End: 2023-07-19 | Stop reason: HOSPADM

## 2023-07-19 RX ORDER — POTASSIUM CHLORIDE 750 MG/1
40 CAPSULE, EXTENDED RELEASE ORAL
Status: DISCONTINUED | OUTPATIENT
Start: 2023-07-19 | End: 2023-07-19 | Stop reason: HOSPADM

## 2023-07-19 RX ORDER — HYDROCODONE BITARTRATE AND ACETAMINOPHEN 5; 325 MG/1; MG/1
1 TABLET ORAL EVERY 4 HOURS PRN
Status: DISCONTINUED | OUTPATIENT
Start: 2023-07-19 | End: 2023-07-19 | Stop reason: HOSPADM

## 2023-07-19 RX ORDER — POLYETHYLENE GLYCOL 3350 17 G/17G
17 POWDER, FOR SOLUTION ORAL DAILY
Qty: 7 PACKET | Refills: 0 | Status: SHIPPED | OUTPATIENT
Start: 2023-07-19

## 2023-07-19 RX ADMIN — SODIUM CHLORIDE, POTASSIUM CHLORIDE, SODIUM LACTATE AND CALCIUM CHLORIDE 100 ML/HR: 600; 310; 30; 20 INJECTION, SOLUTION INTRAVENOUS at 03:42

## 2023-07-19 RX ADMIN — POTASSIUM CHLORIDE 40 MEQ: 750 CAPSULE, EXTENDED RELEASE ORAL at 11:25

## 2023-07-19 RX ADMIN — MORPHINE SULFATE 4 MG: 4 INJECTION, SOLUTION INTRAMUSCULAR; INTRAVENOUS at 01:45

## 2023-07-19 RX ADMIN — NICOTINE 1 PATCH: 21 PATCH, EXTENDED RELEASE TRANSDERMAL at 11:58

## 2023-07-19 RX ADMIN — POLYETHYLENE GLYCOL 3350 17 G: 17 POWDER, FOR SOLUTION ORAL at 09:30

## 2023-07-19 RX ADMIN — POTASSIUM CHLORIDE 10 MEQ: 7.46 INJECTION, SOLUTION INTRAVENOUS at 12:37

## 2023-07-19 RX ADMIN — HYDROCODONE BITARTRATE AND ACETAMINOPHEN 1 TABLET: 5; 325 TABLET ORAL at 09:30

## 2023-07-19 RX ADMIN — POTASSIUM CHLORIDE 10 MEQ: 7.46 INJECTION, SOLUTION INTRAVENOUS at 11:25

## 2023-07-19 RX ADMIN — MORPHINE SULFATE 4 MG: 4 INJECTION, SOLUTION INTRAMUSCULAR; INTRAVENOUS at 07:47

## 2023-07-19 NOTE — PLAN OF CARE
Problem: Adult Inpatient Plan of Care  Goal: Plan of Care Review  Outcome: Ongoing, Progressing  Flowsheets (Taken 7/19/2023 1240)  Plan of Care Reviewed With: patient  Outcome Evaluation: Pt discharging. Yee José, RN.  Goal: Patient-Specific Goal (Individualized)  Outcome: Ongoing, Progressing  Goal: Absence of Hospital-Acquired Illness or Injury  Outcome: Ongoing, Progressing  Intervention: Identify and Manage Fall Risk  Description: Perform standard risk assessment on admission using a validated tool or comprehensive approach appropriate to the patient; reassess fall risk frequently, with change in status or transfer to another level of care.  Communicate fall injury risk to interprofessional healthcare team.  Determine need for increased observation, equipment and environmental modification, such as low bed, signage and supportive, nonskid footwear.  Adjust safety measures to individual developmental age, stage and identified risk factors.  Reinforce the importance of safety and physical activity with patient and family.  Perform regular intentional rounding to assess need for position change, pain assessment and personal needs, including assistance with toileting.  Recent Flowsheet Documentation  Taken 7/19/2023 0704 by Yee Kumar RN  Safety Promotion/Fall Prevention: safety round/check completed  Intervention: Prevent Skin Injury  Description: Perform a screening for skin injury risk, such as pressure or moisture associated skin damage on admission and at regular intervals throughout hospital stay.  Keep all areas of skin (especially folds) clean and dry.  Maintain adequate skin hydration.  Relieve and redistribute pressure and protect bony prominences; implement measures based on patient-specific risk factors.  Match turning and repositioning schedule to clinical condition.  Encourage weight shift frequently; assist with reposition if unable to complete independently.  Float heels off bed; avoid  pressure on the Achilles tendon.  Keep skin free from extended contact with medical devices.  Encourage functional activity and mobility, as early as tolerated.  Use aids (e.g., slide boards, mechanical lift) during transfer.  Recent Flowsheet Documentation  Taken 7/19/2023 0747 by Yee Kumar RN  Body Position: position changed independently  Goal: Optimal Comfort and Wellbeing  Outcome: Ongoing, Progressing  Intervention: Monitor Pain and Promote Comfort  Description: Assess pain level, treatment efficacy and patient response at regular intervals using a consistent pain scale.  Consider the presence and impact of preexisting chronic pain.  Encourage patient and caregiver involvement in pain assessment, interventions and safety measures.  Recent Flowsheet Documentation  Taken 7/19/2023 0747 by Yee Kumar RN  Pain Management Interventions: see MAR  Intervention: Provide Person-Centered Care  Description: Use a family-focused approach to care.  Develop trust and rapport by proactively providing information, encouraging questions, addressing concerns and offering reassurance.  Acknowledge emotional response to hospitalization.  Recognize and utilize personal coping strategies.  Honor spiritual and cultural preferences.  Recent Flowsheet Documentation  Taken 7/19/2023 0747 by Yee Kumar RN  Trust Relationship/Rapport:   care explained   choices provided   questions answered   questions encouraged   reassurance provided   thoughts/feelings acknowledged  Goal: Readiness for Transition of Care  Outcome: Ongoing, Progressing   Goal Outcome Evaluation:  Plan of Care Reviewed With: patient           Outcome Evaluation: Pt discharging. Yee Kumar RN.

## 2023-07-19 NOTE — PROGRESS NOTES
"POST OP PROGRESS NOTE     Patient Name:  Vita Cuevas  YOB: 1965  [id number]   LOS: 0 days   1 Day Post-Op  Patient Care Team:  Thierno Li MD as PCP - General (Internal Medicine)          Subjective     Interval History:  VSS, afebrile, tolerating diet, pain controlled.       Review of Systems:    A complete review of systems was performed and all are negative except what is documented in the HPI.       Objective     Constitutional:  well nourished, no acute distress, appear stated age /86 (BP Location: Right arm, Patient Position: Lying) Comment: notified rn. pt in pain  Pulse 73   Temp 97.6 °F (36.4 °C) (Oral)   Resp 16   Ht 162.6 cm (64\")   Wt 88.6 kg (195 lb 5.2 oz)   SpO2 97%   BMI 33.53 kg/m²    Eyes:  anicteric sclerae, moist conjunctivae, no lid lag, PERRLA  ENMT:  oropharynx clear, moist mucous membranes, good dentition  Neck:   full ROM, trachea midline, no thyromegaly  Cardiovascular: RRR, S1 and S2 present, no MRG, heart rate 73, no pedal edema  Respiratory: lungs CTA, respirations even and unlabored  GI:  Abdomen soft, approp tender, nondistended, no HSM     Skin:  warm and dry, normal turgor, no rashes  Psychiatric:  alert and oriented x3, intact judgment and insight, cooperative          Results Review:       I reviewed the patient's new clinical results including  CMP     WBC   Date Value Ref Range Status   07/18/2023 8.33 3.40 - 10.80 10*3/mm3 Final     RBC   Date Value Ref Range Status   07/18/2023 4.30 3.77 - 5.28 10*6/mm3 Final     Hemoglobin   Date Value Ref Range Status   07/18/2023 13.2 12.0 - 15.9 g/dL Final     Hematocrit   Date Value Ref Range Status   07/18/2023 39.4 34.0 - 46.6 % Final     MCV   Date Value Ref Range Status   07/18/2023 91.6 79.0 - 97.0 fL Final     MCH   Date Value Ref Range Status   07/18/2023 30.7 26.6 - 33.0 pg Final     MCHC   Date Value Ref Range Status   07/18/2023 33.5 31.5 - 35.7 g/dL Final     RDW   Date Value Ref Range " Status   07/18/2023 12.9 12.3 - 15.4 % Final     RDW-SD   Date Value Ref Range Status   07/18/2023 43.0 37.0 - 54.0 fl Final     MPV   Date Value Ref Range Status   07/18/2023 11.4 6.0 - 12.0 fL Final     Platelets   Date Value Ref Range Status   07/18/2023 215 140 - 450 10*3/mm3 Final     Neutrophil %   Date Value Ref Range Status   07/18/2023 88.7 (H) 42.7 - 76.0 % Final     Lymphocyte %   Date Value Ref Range Status   07/18/2023 8.6 (L) 19.6 - 45.3 % Final     Monocyte %   Date Value Ref Range Status   07/18/2023 2.3 (L) 5.0 - 12.0 % Final     Eosinophil %   Date Value Ref Range Status   07/18/2023 0.0 (L) 0.3 - 6.2 % Final     Basophil %   Date Value Ref Range Status   07/18/2023 0.2 0.0 - 1.5 % Final     Immature Grans %   Date Value Ref Range Status   07/18/2023 0.2 0.0 - 0.5 % Final     Neutrophils, Absolute   Date Value Ref Range Status   07/18/2023 7.38 (H) 1.70 - 7.00 10*3/mm3 Final     Lymphocytes, Absolute   Date Value Ref Range Status   07/18/2023 0.72 0.70 - 3.10 10*3/mm3 Final     Monocytes, Absolute   Date Value Ref Range Status   07/18/2023 0.19 0.10 - 0.90 10*3/mm3 Final     Eosinophils, Absolute   Date Value Ref Range Status   07/18/2023 0.00 0.00 - 0.40 10*3/mm3 Final     Basophils, Absolute   Date Value Ref Range Status   07/18/2023 0.02 0.00 - 0.20 10*3/mm3 Final     Immature Grans, Absolute   Date Value Ref Range Status   07/18/2023 0.02 0.00 - 0.05 10*3/mm3 Final     nRBC   Date Value Ref Range Status   07/18/2023 0.0 0.0 - 0.2 /100 WBC Final         Basic Metabolic Panel    Sodium Sodium   Date Value Ref Range Status   07/19/2023 142 136 - 145 mmol/L Final   07/18/2023 140 136 - 145 mmol/L Final   07/17/2023 143 136 - 145 mmol/L Final      Potassium Potassium   Date Value Ref Range Status   07/19/2023 2.9 (L) 3.5 - 5.2 mmol/L Final   07/18/2023 3.7 3.5 - 5.2 mmol/L Final   07/17/2023 3.5 3.5 - 5.2 mmol/L Final      Chloride Chloride   Date Value Ref Range Status   07/19/2023 108 (H) 98 - 107  mmol/L Final   07/18/2023 105 98 - 107 mmol/L Final   07/17/2023 107 98 - 107 mmol/L Final      Bicarbonate No results found for: PLASMABICARB   BUN BUN   Date Value Ref Range Status   07/19/2023 15 6 - 20 mg/dL Final   07/18/2023 13 6 - 20 mg/dL Final   07/17/2023 14 6 - 20 mg/dL Final      Creatinine Creatinine   Date Value Ref Range Status   07/19/2023 0.70 0.57 - 1.00 mg/dL Final   07/18/2023 0.89 0.57 - 1.00 mg/dL Final   07/17/2023 0.80 0.57 - 1.00 mg/dL Final      Calcium Calcium   Date Value Ref Range Status   07/19/2023 8.3 (L) 8.6 - 10.5 mg/dL Final   07/18/2023 9.2 8.6 - 10.5 mg/dL Final   07/17/2023 9.8 8.6 - 10.5 mg/dL Final      Glucose      No components found for: GLUCOSE.*       Lab Results   Component Value Date    GLUCOSE 91 07/19/2023    BUN 15 07/19/2023    CREATININE 0.70 07/19/2023    EGFR 101.6 07/19/2023    BCR 21.4 07/19/2023    K 2.9 (L) 07/19/2023    CO2 25.8 07/19/2023    CALCIUM 8.3 (L) 07/19/2023    ALBUMIN 3.2 (L) 07/19/2023    BILITOT 0.5 07/19/2023     (H) 07/19/2023     (H) 07/19/2023       IMAGING:  Imaging Results (Last 72 Hours)       Procedure Component Value Units Date/Time    FL Surgery Fluoro [[id number]] Resulted: 07/18/23 1648     Updated: 07/18/23 1648    Narrative:      This procedure was auto-finalized with no dictation required.    FL Surgery Fluoro [[id number]] Resulted: 07/17/23 1750     Updated: 07/17/23 1750    Narrative:      This procedure was auto-finalized with no dictation required.    MRI abdomen wo contrast mrcp [[id number]] Collected: 07/17/23 1157     Updated: 07/17/23 1200    Narrative:      PROCEDURE: MRI ABDOMEN WO CONTRAST MRCP     COMPARISON: [facility], CT, CT ABDOMEN PELVIS W CONTRAST, 7/17/2023, 8:27.     INDICATIONS: Abdominal pain, CBD 9 mm on CT scan. Gallbladder distention, mild biliary ductal   dilatation.             TECHNIQUE: MRI of the abdomen without intravenous contrast. MRCP without contrast. 3-D MIP  images    were performed.     ABDOMEN:  There are a few hepatic cysts measuring up to 1.5 cm.  There is a 2 cm right renal cyst.    The left kidney is normal.  The spleen, pancreas and adrenal glands are normal.  The abdominal   aorta has a normal caliber.       MRCP:  Motion artifact is present.  The gallbladder is distended.  There are tiny stones in the   gallbladder.  Pericholecystic fluid/inflammatory change is present.  There is no pancreatic ductal   dilatation.  The common bile duct is dilated measuring 9 mm.  Questionable 3 mm stone in the distal   common bile duct.       IMPRESSION:      1. Cholelithiasis.  Distended gallbladder with pericholecystic fluid/inflammatory change suspicious   for acute cholecystitis.     2. Dilated common bile duct measuring 9 mm.  Questionable 3 mm stone in the distal common bile   duct.  The images are obscured by motion artifact.       ALBERTO CHRISTINA MD         Electronically Signed and Approved By: ALBERTO CHRISTINA MD on 7/17/2023 at 11:57                     XR Chest 1 View [[id number]] Collected: 07/17/23 0920     Updated: 07/17/23 0923    Narrative:      PROCEDURE: XR CHEST 1 VW     COMPARISON: [facility], , CHEST AP/PA 1 VIEW, 4/28/2016, 20:36.     INDICATIONS: SOB, CHEST PAIN, BACK PAIN     FINDINGS:   The lungs are adequately expanded. There is no focal consolidation, pneumothorax, or obvious   pleural effusion. The pulmonary vasculature appears within normal limits. The cardiac and   mediastinal contours are within normal limits. The osseous structures appear intact.       Impression:         No acute cardiopulmonary process.                      FAISAL RIVERA MD         Electronically Signed and Approved By: FAISAL RIVERA MD on 7/17/2023 at 9:20                     CT Abdomen Pelvis With Contrast [[id number]] Collected: 07/17/23 0857     Updated: 07/17/23 0900    Narrative:      PROCEDURE: CT ABDOMEN PELVIS W CONTRAST     COMPARISON: [facility],  CT, ABD PEL W/O CONTRAST, 7/21/2013, 4:25.     INDICATIONS: Abdominal and bilateral flank pain     TECHNIQUE: After obtaining the patient's consent, CT images were created with non-ionic intravenous   contrast material.       PROTOCOL:   Standard imaging protocol performed      RADIATION:   DLP: 942.6mGy*cm    Automated exposure control was utilized to minimize radiation dose.   CONTRAST: 100cc Isovue 370 I.V.     FINDINGS:   Limited views of the lung bases are unremarkable.  There are scattered cysts within the liver.  The   portal vasculature is patent.  Common bile duct is dilated measuring 9 mm.  There is mild   distension and stranding surrounding the gallbladder, which could reflect cholecystitis.  The   pancreas is unremarkable.  Spleen is normal.     Bilateral adrenal glands are normal.  There is a simple right renal cyst.  The left kidney is   unremarkable.  The bladder is unremarkable.  Fibroid uterus.     There is diverticulosis without evidence of diverticulitis identified.  The appendix is normal.    The small bowel is unremarkable.     No adenopathy is identified.  Scattered atherosclerotic plaque.  No aggressive appearing lytic or   sclerotic bone lesions are identified.       Impression:         1. There is gallbladder distention as well as a small amount of pericholecystic fluid, raising the   possibility of acute cholecystitis.  2. There is mild biliary duct dilation measuring 9 mm, cannot exclude a choledocholithiasis.            DAYANARA JIMENEZ MD         Electronically Signed and Approved By: DAYAANRA JIMENEZ MD on 7/17/2023 at 8:56                             Medications:    Current Facility-Administered Medications:     HYDROcodone-acetaminophen (NORCO) 5-325 MG per tablet 1 tablet, 1 tablet, Oral, Q4H PRN, Michela Ray, APRN    lactated ringers infusion, 100 mL/hr, Intravenous, Continuous, Marbella Razo MD, Last Rate: 100 mL/hr at 07/19/23 0342, 100 mL/hr at 07/19/23 0342    lactated  ringers infusion, 30 mL/hr, Intravenous, Continuous, Vinayak Lopez MD, Last Rate: 30 mL/hr at 07/18/23 1444, New Bag at 07/18/23 1541    morphine injection 4 mg, 4 mg, Intravenous, Q3H PRN, 4 mg at 07/19/23 0747 **AND** naloxone (NARCAN) injection 0.4 mg, 0.4 mg, Intravenous, Q5 Min PRN, Marbella Razo MD    nicotine (NICODERM CQ) 21 MG/24HR patch 1 patch, 1 patch, Transdermal, Q24H, Jose Luis Sebastian MD, 1 patch at 07/18/23 1301    ondansetron (ZOFRAN) injection 4 mg, 4 mg, Intravenous, Q6H PRN, Marbella Razo MD, 4 mg at 07/17/23 1452    polyethylene glycol (MIRALAX) packet 17 g, 17 g, Oral, Daily, Michela Ray APRN    sodium chloride 0.9 % flush 10 mL, 10 mL, Intravenous, PRN, Marbella Razo MD    [COMPLETED] Insert Peripheral IV, , , Once **AND** sodium chloride 0.9 % flush 10 mL, 10 mL, Intravenous, PRN, Marbella Razo MD    sodium chloride 0.9 % flush 10 mL, 10 mL, Intravenous, Q12H, Marbella Razo MD, 10 mL at 07/18/23 2201    sodium chloride 0.9 % flush 10 mL, 10 mL, Intravenous, PRN, Marbella Razo MD    sodium chloride 0.9 % infusion 40 mL, 40 mL, Intravenous, PRN, Marbella Razo MD    traZODone (DESYREL) tablet 50 mg, 50 mg, Oral, Nightly PRN, Jose Luis Sebastian MD, 50 mg at 07/18/23 3518    Assessment & Plan       Acute cholecystitis    Calculus of gallbladder    Choledocholithiasis with acute cholecystitis    S/P laparoscopic cholecystectomy   Regular diet   Ok to [place]  home    Follow up, home instructions placed in chart   Narcotic script sent to pharmacy          Electronically signed by TIFFANY Owusu, 07/19/23, 9:11 AM EDT.

## 2023-07-19 NOTE — DISCHARGE SUMMARY
[facility]         HOSPITALIST  DISCHARGE SUMMARY    Patient Name: Vita Cuevas  : 1965  MRN: [id number]    Date of Admission: 2023  Date of Discharge: 2023  Primary Care Physician: Thierno Li MD    Consults       Date and Time Order Name Status Description    2023  2:44 PM Inpatient General Surgery Consult Completed     2023  1:34 PM Inpatient Hospitalist Consult      2023 12:09 PM IP General Consult (Use specialty-specific consult if known)      2023  9:45 AM Surgery (on-call MD unless specified)              Active and Resolved Hospital Problems:  Acute cholecystitis  Transaminitis  Sludge of the common bile duct     Active Hospital Problems    Diagnosis POA    **Acute cholecystitis [K81.0] Yes    S/P laparoscopic cholecystectomy [Z90.49] Not Applicable    Calculus of gallbladder [K80.20] Yes    Choledocholithiasis with acute cholecystitis [K80.42] Unknown      Resolved Hospital Problems   No resolved problems to display.       Hospital Course     Hospital Course:  Vita Cuevas is a 57 y.o. female  past medical history of hypertension, anxiety, and PTSD who presented with right upper quadrant pain started on the morning of presentation.  No fevers or chills.  Pain became debilitating patient presented the emergency department for further evaluation and treatment.     In the emergency department: Patient's vital signs were as follows temperature 98.4, pulse 55, respiratory is 20, blood pressure 120/60, and a percent room air ECG shows no abnormalities.  CMP shows a glucose of 120 no other abnormalities.  Normal lactate.  Urinalysis was bland.  X-ray shows no abnormalities.  CT of the abdomen pelvis shows gallbladder distention as well as small mount of pericholecystic fluid raising the possibility of acute cholecystitis and there is mild biliary duct dilation with 9 mm.  MRCP shows cholelithiasis distended gallbladder with  pericholecystic fluid concerning for gallbladder and dilated common bile duct of 9 mm.  Questionable 3 mm stone but this could be due to motion artifact.  Emergency department physician contacted GI at [facility] but did not feel that the CBD was actually dilated and recommended general surgery to remove gallbladder and if there was a stone on interoperative cholangiogram then would need GI to do an ERCP at that time.  Patient admitted to the hospital for further management of acute cholecystitis concern for possible choledocholithiasis.  Patient underwent laparoscopic cholecystectomy.  Intraoperative cholangiogram indeed concerning for choledocholithiasis.  LFTs trended up sharply.  Patient underwent ERCP.  Sphincterotomy performed.  Sludge was swept from the common bile duct.  Temporary stent placed in the common bile duct.  Patient will need repeat ERCP in 4 weeks for stent removal.  LFTs trending down.  Electrolytes replaced as needed.  Pain controlled with p.o. analgesics.  Diet advanced as tolerated.  Patient seen and evaluated on date of discharge and thus stable for discharge home to follow-up with her primary care provider and Dr. Razo general surgery in 1 to 2 weeks follow-up with Dr. Lee gastroenterology in 1 month for stent removal.        DISCHARGE Follow Up Recommendations for labs and diagnostics: As above      Day of Discharge     Vital Signs:  Temp:  [97.3 °F (36.3 °C)-98 °F (36.7 °C)] 97.6 °F (36.4 °C)  Heart Rate:  [56-79] 73  Resp:  [13-18] 16  BP: (116-162)/(64-85) 162/85  Physical Exam:   Gen. well-developed appearing stated age in no acute distress  HEENT: Normocephalic atraumatic moist membranes pupils equal round reactive light, no scleral icterus no conjunctival injection  Cardiovascular: regular rate and rhythm no murmurs rubs or gallops S1-S2, no lower extremity edema appreciated  Pulmonary: Clear to auscultation bilaterally no wheezes rales or rhonchi symmetric chest expansion,  unlabored, no conversational dyspnea appreciated  Gastrointestinal: Soft nontender nondistended positive bowel sounds all 4 quadrants no rebound or guarding, laparoscopic wounds of the anterior abdomen clean dry intact  Musculoskeletal: No clubbing cyanosis, warm and well-perfused, calves soft symmetric nontender bilaterally  Skin: Clean dry without rashs  Neuro: Cranial nerves II through XII intact grossly no sensorimotor deficits appreciated bilateral upper and lower extremities  Psych: Patient is calm cooperative and appropriate with exam not responding to internal stimuli  : No Vale catheter no bladder distention no suprapubic tenderness      Discharge Details        Discharge Medications        New Medications        Instructions Start Date   HYDROcodone-acetaminophen 5-325 MG per tablet  Commonly known as: NORCO   1 tablet, Oral, Every 4 Hours PRN      polyethylene glycol 17 g packet  Commonly known as: MIRALAX   17 g, Oral, Daily             Continue These Medications        Instructions Start Date   albuterol sulfate  (90 Base) MCG/ACT inhaler  Commonly known as: PROVENTIL HFA;VENTOLIN HFA;PROAIR HFA   2 puffs, Inhalation, Every 4 Hours PRN      amLODIPine-benazepril 10-20 MG per capsule  Commonly known as: Lotrel   1 capsule, Oral, Daily      chlorthalidone 25 MG tablet  Commonly known as: HYGROTON   25 mg, Oral, Daily      gabapentin 300 MG capsule  Commonly known as: NEURONTIN   300 mg, Oral, 4 Times Daily      ibuprofen 800 MG tablet  Commonly known as: ADVIL,MOTRIN   Take 1 tablet by mouth Every 8 (Eight) Hours As Needed.      ondansetron ODT 4 MG disintegrating tablet  Commonly known as: ZOFRAN-ODT   4 mg, Translingual, Every 8 Hours PRN      traZODone 50 MG tablet  Commonly known as: DESYREL   50 mg, Oral, Nightly               No Known Allergies    Discharge Disposition:  Home or Self Care    Diet:  Hospital:  Diet Order   Procedures    Diet: Regular/House Diet; Texture: Regular Texture  (IDDSI 7); Fluid Consistency: Thin (IDDSI 0)       Discharge Activity:   Activity Instructions       Driving Restrictions      Type of Restriction: Driving    Driving Restrictions: No Driving While Taking Narcotics    Gradually Increase Activity Until at Pre-Hospitalization Level      Work Restrictions      Please excuse from work 717/2023 thru 7/19/2023    Type of Restriction: Work    May Return to Work: Immediately    With / Without Restrictions: With Restrictions    Explain Work Restrictions: no lifting over 10 pounds for 2 weeks            CODE STATUS:  Code Status and Medical Interventions:   Ordered at: 07/18/23 0851     Level Of Support Discussed With:    Patient     Code Status (Patient has no pulse and is not breathing):    CPR (Attempt to Resuscitate)     Medical Interventions (Patient has pulse or is breathing):    Full Support         Future Appointments   Date Time Provider Department [place]   7/20/2023  9:45 AM Gio Roberto DPM [facility]   9/14/2023  7:00 AM [address]   2/5/2024 10:00 AM Kayy Argueta APRN [facility]       Additional Instructions for the Follow-ups that You Need to Schedule       Discharge Follow-up with PCP   As directed       Currently Documented PCP:    Thierno Li MD    PCP Phone Number:    [phone]     Follow Up Details: Hospital discharge follow-up acute cholecystitis, choledocholithiasis status postcholecystectomy and ERCP with temporary stent placement         Discharge Follow-up with Specialty: follow up in 2 weeks with Dr. Razo   As directed      Specialty: follow up in 2 weeks with Dr. Razo         Discharge Follow-up with Specified Provider: Dr. Lee gastroenterology; 1 Month   As directed      To: Dr. Lee gastroenterology    Follow Up: 1 Month    Follow Up Details: Hospital discharge follow-up cholecystitis with sludge in the common bile duct status post ERCP with temporary stent placement.  Needs  repeat ERCP in 4 weeks for removal of stent                 Pertinent  and/or Most Recent Results     PROCEDURES:      Marbella Razo MD   Physician  Surgery     Op Note     Signed     Date of Service: 07/17/23 1711  Creation Time: 07/17/23 1824  Case Time: Procedures: Surgeons:   07/17/23 1711 CHOLECYSTECTOMY LAPAROSCOPIC, INTRAOPERATIVE CHOLANGIOGRAM    Marbella Razo MD               Signed         CHOLECYSTECTOMY LAPAROSCOPIC POSSIBLE OPEN CHOLECYSTECTOMY  Procedure Report     Patient Name:  Vita Cuevas  YOB: 1965     Date of Surgery:  7/17/2023     Indications:  Biliary colic     Pre-op Diagnosis:   Choledocholithiasis with acute cholecystitis [K80.42]       Post-Op Diagnosis Codes:     * Choledocholithiasis with acute cholecystitis [K80.42]     Procedure/CPT® Codes:        Procedure(s):  CHOLECYSTECTOMY LAPAROSCOPIC, INTRAOPERATIVE CHOLANGIOGRAM     Staff:  Surgeon(s):  Marbella Razo MD     Assistant: Nani Rothman RN     Anesthesia: General     Estimated Blood Loss: 10 mL     Implants:    Implant Name Type Inv. Item Serial No.  Lot No. LRB No. Used Action   CLIPAPPLR M/ ENDO LIGACLIP ROT 10MM MD/THOMAS - [id number] Implant CLIPAPPLR M/ ENDO LIGACLIP ROT 10MM MD/LG   [employer]  DIV OF J AND J [id number] N/A 1 Implanted         Specimen:          Specimens         ID Source Type Tests Collected By Collected At Frozen?     A Gallbladder Tissue TISSUE PATHOLOGY EXAM    Marbella Razo MD 7/17/23 4961 No     Description: gallbladder and contents                    Findings: none     Complications: none     Description of Procedure:   After informed consent was obtained the patient was taken to the operating room placed supine on the table. The patient was prepped and draped in normal sterile fashion. A veress needle was inserted into the left upper quadrant in standard fashion and the abdomen was insufflated without complication. We began by  inserting a 5 mm periumbilical optiview trocar under direct visualization. The abdomen was insufflated, and an epigastric 10/12 mm port as well as 2 RUQ 5 mm ports were placed under direct visualization. The gallbladder was grasped and retracted cephalad and the omental attachments were bluntly dissected off. The infundibulum was grasped and retracted laterally. At this point in time I used [place] dissector to dissect out the cystic artery and cystic duct. A Clay clamp was used to perform cholangiogram in standard fashion and the biliary system was evaluated.  There was no obvious filling of the small bowel and there appeared to be a common bile duct blockage.  The remainder of the biliary tree did fill including the right and left hepatic ducts.  The critical view was obtained and we were able to see the liver between the cystic duct and cystic artery. IC Green and SPY technology were used to evaluate the cystic and common bile ducts. B Both the cystic duct and cystic artery were both clipped twice proximally and once distally. They were ligated leaving two clips on both the cystic duct and cystic artery stumps. The gallbladder was then grasped and retracted upwards and removed from the gallbladder fossa using electrocautery. It was placed into a bag and removed from the epigastric port. The trocars were reinserted and the gallbladder fossa was inspected and the bleeding gallbladder fossa was controlled using electrocautery for hemostasis. The 10/12 mm port site was closed with mersilene and then the ports removed under direct visualization and the abdomen was desufflated. The skin sites were closed using subcuticular Vicryl stitches and local anesthetic was injected. The patient was awakened and taken to the recovery room in good condition. All counts were correct at the end of the case  Assistant: Nani Rothman, DANITZA  was responsible for performing the following activities: Retraction, Suction, and  Held/Positioned Camera and their skilled assistance was necessary for the success of this case.     Marbella Razo MD      Date: 7/17/2023  Time: 18:24 EDT                    ERCP 7/18/2023 with Dr. Lee gastroenterology  - The patient has had a cholecystectomy.  - A biliary sphincterotomy was performed.  - The biliary tree was swept and sludge was found.  - One temporary stent was placed into the common bile duct.  Impression:  - Repeat ERCP in 4 weeks to remove stent.    LAB RESULTS:      Lab 07/18/23  0522 07/17/23  0607   WBC 8.33 8.49   HEMOGLOBIN 13.2 13.9   HEMATOCRIT 39.4 40.7   PLATELETS 215 224   NEUTROS ABS 7.38* 2.58   IMMATURE GRANS (ABS) 0.02 0.02   LYMPHS ABS 0.72 4.99*   MONOS ABS 0.19 0.56   EOS ABS 0.00 0.27   MCV 91.6 88.9   LACTATE  --  1.8   PROTIME 13.6  --          Lab 07/19/23  0604 07/18/23  0522 07/17/23  0607   SODIUM 142 140 143   POTASSIUM 2.9* 3.7 3.5   CHLORIDE 108* 105 107   CO2 25.8 24.5 24.6   ANION GAP 8.2 10.5 11.4   BUN 15 13 14   CREATININE 0.70 0.89 0.80   EGFR 101.6 76.2 86.6   GLUCOSE 91 126* 120*   CALCIUM 8.3* 9.2 9.8   MAGNESIUM  --  1.7  --          Lab 07/19/23  0604 07/18/23  0522 07/17/23  0607   TOTAL PROTEIN 5.5* 6.8 7.2   ALBUMIN 3.2* 4.0 4.4   GLOBULIN 2.3 2.8 2.8   ALT (SGPT) 270* 526* 16   AST (SGOT) 128* 505* 18   BILIRUBIN 0.5 0.9 0.3   ALK PHOS 95 133* 87   LIPASE  --   --  27         Lab 07/18/23  0522   PROTIME 13.6   INR 1.03                 Brief Urine Lab Results  (Last result in the past 365 days)        Color   Clarity   Blood   Leuk Est   Nitrite   Protein   CREAT   Urine HCG        07/17/23 0802 Yellow   Clear   Negative   Negative   Negative   Negative                 Microbiology Results (last 10 days)       Procedure Component Value - Date/Time    Blood Culture - Blood, Arm, Left [[id number]]  (Normal) Collected: 07/17/23 1406    Lab Status: Preliminary result Specimen: Blood from Arm, Left Updated: 07/18/23 1415     Blood Culture No growth  at 24 hours    Blood Culture - Blood, Arm, Left [[id number]]  (Normal) Collected: 07/17/23 1401    Lab Status: Preliminary result Specimen: Blood from Arm, Left Updated: 07/18/23 1415     Blood Culture No growth at 24 hours            CT Abdomen Pelvis With Contrast    Result Date: 7/17/2023  Impression:   1. There is gallbladder distention as well as a small amount of pericholecystic fluid, raising the possibility of acute cholecystitis. 2. There is mild biliary duct dilation measuring 9 mm, cannot exclude a choledocholithiasis.     DAYANARA JIMENEZ MD       Electronically Signed and Approved By: DAYANARA JIMENEZ MD on 7/17/2023 at 8:56             XR Chest 1 View    Result Date: 7/17/2023  Impression:   No acute cardiopulmonary process.        FAISAL RIVERA MD       Electronically Signed and Approved By: FAISAL RIVERA MD on 7/17/2023 at 9:20                          Labs Pending at Discharge:  Pending Labs       Order Current Status    Blood Culture - Blood, Arm, Left Preliminary result    Blood Culture - Blood, Arm, Left Preliminary result              Time spent on Discharge including face to face service: Greater than 35 minutes    Electronically signed by Jose Luis Sebastian MD, 07/19/23, 10:54 AM EDT.

## 2023-07-22 LAB
BACTERIA SPEC AEROBE CULT: NORMAL
BACTERIA SPEC AEROBE CULT: NORMAL

## 2023-07-25 ENCOUNTER — LAB (OUTPATIENT)
Dept: LAB | Facility: HOSPITAL | Age: 58
End: 2023-07-25
Payer: COMMERCIAL

## 2023-07-25 ENCOUNTER — PATIENT MESSAGE (OUTPATIENT)
Dept: SURGERY | Facility: CLINIC | Age: 58
End: 2023-07-25
Payer: COMMERCIAL

## 2023-07-25 DIAGNOSIS — Z86.39 HISTORY OF LOW POTASSIUM: ICD-10-CM

## 2023-07-25 DIAGNOSIS — Z86.39 HISTORY OF LOW POTASSIUM: Primary | ICD-10-CM

## 2023-07-25 LAB
ALBUMIN SERPL-MCNC: 4.2 G/DL (ref 3.5–5.2)
ALBUMIN/GLOB SERPL: 1.2 G/DL
ALP SERPL-CCNC: 128 U/L (ref 39–117)
ALT SERPL W P-5'-P-CCNC: 66 U/L (ref 1–33)
ANION GAP SERPL CALCULATED.3IONS-SCNC: 14.1 MMOL/L (ref 5–15)
AST SERPL-CCNC: 24 U/L (ref 1–32)
BILIRUB SERPL-MCNC: 0.5 MG/DL (ref 0–1.2)
BUN SERPL-MCNC: 9 MG/DL (ref 6–20)
BUN/CREAT SERPL: 11.8 (ref 7–25)
CALCIUM SPEC-SCNC: 9.8 MG/DL (ref 8.6–10.5)
CHLORIDE SERPL-SCNC: 99 MMOL/L (ref 98–107)
CO2 SERPL-SCNC: 23.9 MMOL/L (ref 22–29)
CREAT SERPL-MCNC: 0.76 MG/DL (ref 0.57–1)
EGFRCR SERPLBLD CKD-EPI 2021: 92.1 ML/MIN/1.73
GLOBULIN UR ELPH-MCNC: 3.5 GM/DL
GLUCOSE SERPL-MCNC: 101 MG/DL (ref 65–99)
POTASSIUM SERPL-SCNC: 3.1 MMOL/L (ref 3.5–5.2)
PROT SERPL-MCNC: 7.7 G/DL (ref 6–8.5)
SODIUM SERPL-SCNC: 137 MMOL/L (ref 136–145)

## 2023-07-25 PROCEDURE — 80053 COMPREHEN METABOLIC PANEL: CPT

## 2023-07-25 PROCEDURE — 36415 COLL VENOUS BLD VENIPUNCTURE: CPT

## 2023-07-25 NOTE — TELEPHONE ENCOUNTER
I have called and spoke with this patient. Patient had a CMP prior to discharge and her potassium was low at 2.9. She was given a K+ run and K+ pills prior to discharge. No repeat CMP was performed. I order a repeat cmp today.

## 2023-07-26 RX ORDER — POTASSIUM CHLORIDE 20 MEQ/1
20 TABLET, EXTENDED RELEASE ORAL DAILY
Qty: 5 TABLET | Refills: 0 | Status: SHIPPED | OUTPATIENT
Start: 2023-07-26 | End: 2023-07-31

## 2023-07-31 ENCOUNTER — HOSPITAL ENCOUNTER (EMERGENCY)
Facility: HOSPITAL | Age: 58
Discharge: HOME | End: 2023-07-31
Attending: EMERGENCY MEDICINE | Admitting: EMERGENCY MEDICINE
Payer: COMMERCIAL

## 2023-07-31 ENCOUNTER — TELEPHONE (OUTPATIENT)
Dept: SURGERY | Facility: CLINIC | Age: 58
End: 2023-07-31
Payer: COMMERCIAL

## 2023-07-31 ENCOUNTER — APPOINTMENT (OUTPATIENT)
Dept: CT IMAGING | Facility: HOSPITAL | Age: 58
End: 2023-07-31
Payer: COMMERCIAL

## 2023-07-31 ENCOUNTER — TELEPHONE (OUTPATIENT)
Dept: GASTROENTEROLOGY | Facility: CLINIC | Age: 58
End: 2023-07-31
Payer: COMMERCIAL

## 2023-07-31 VITALS
SYSTOLIC BLOOD PRESSURE: 144 MMHG | WEIGHT: 186.95 LBS | DIASTOLIC BLOOD PRESSURE: 40 MMHG | TEMPERATURE: 98.6 F | BODY MASS INDEX: 31.15 KG/M2 | HEART RATE: 72 BPM | HEIGHT: 65 IN | RESPIRATION RATE: 16 BRPM | OXYGEN SATURATION: 98 %

## 2023-07-31 DIAGNOSIS — R10.10 PAIN OF UPPER ABDOMEN: Primary | ICD-10-CM

## 2023-07-31 LAB
ALBUMIN SERPL-MCNC: 4.1 G/DL (ref 3.5–5.2)
ALBUMIN/GLOB SERPL: 1.4 G/DL
ALP SERPL-CCNC: 114 U/L (ref 39–117)
ALT SERPL W P-5'-P-CCNC: 33 U/L (ref 1–33)
ANION GAP SERPL CALCULATED.3IONS-SCNC: 9.6 MMOL/L (ref 5–15)
AST SERPL-CCNC: 20 U/L (ref 1–32)
BACTERIA UR QL AUTO: ABNORMAL /HPF
BASOPHILS # BLD AUTO: 0.11 10*3/MM3 (ref 0–0.2)
BASOPHILS NFR BLD AUTO: 1.1 % (ref 0–1.5)
BILIRUB SERPL-MCNC: 0.4 MG/DL (ref 0–1.2)
BILIRUB UR QL STRIP: NEGATIVE
BUN SERPL-MCNC: 10 MG/DL (ref 6–20)
BUN/CREAT SERPL: 13.5 (ref 7–25)
CALCIUM SPEC-SCNC: 9.4 MG/DL (ref 8.6–10.5)
CHLORIDE SERPL-SCNC: 104 MMOL/L (ref 98–107)
CLARITY UR: CLEAR
CO2 SERPL-SCNC: 24.4 MMOL/L (ref 22–29)
COLOR UR: YELLOW
CREAT SERPL-MCNC: 0.74 MG/DL (ref 0.57–1)
D-LACTATE SERPL-SCNC: 1.1 MMOL/L (ref 0.5–2)
DEPRECATED RDW RBC AUTO: 43.4 FL (ref 37–54)
EGFRCR SERPLBLD CKD-EPI 2021: 95.1 ML/MIN/1.73
EOSINOPHIL # BLD AUTO: 0.45 10*3/MM3 (ref 0–0.4)
EOSINOPHIL NFR BLD AUTO: 4.3 % (ref 0.3–6.2)
ERYTHROCYTE [DISTWIDTH] IN BLOOD BY AUTOMATED COUNT: 13.2 % (ref 12.3–15.4)
GLOBULIN UR ELPH-MCNC: 3 GM/DL
GLUCOSE SERPL-MCNC: 86 MG/DL (ref 65–99)
GLUCOSE UR STRIP-MCNC: NEGATIVE MG/DL
HCT VFR BLD AUTO: 36 % (ref 34–46.6)
HGB BLD-MCNC: 11.9 G/DL (ref 12–15.9)
HGB UR QL STRIP.AUTO: NEGATIVE
HOLD SPECIMEN: NORMAL
HOLD SPECIMEN: NORMAL
HYALINE CASTS UR QL AUTO: ABNORMAL /LPF
IMM GRANULOCYTES # BLD AUTO: 0.03 10*3/MM3 (ref 0–0.05)
IMM GRANULOCYTES NFR BLD AUTO: 0.3 % (ref 0–0.5)
KETONES UR QL STRIP: NEGATIVE
LEUKOCYTE ESTERASE UR QL STRIP.AUTO: ABNORMAL
LIPASE SERPL-CCNC: 93 U/L (ref 13–60)
LYMPHOCYTES # BLD AUTO: 3.59 10*3/MM3 (ref 0.7–3.1)
LYMPHOCYTES NFR BLD AUTO: 34.3 % (ref 19.6–45.3)
MCH RBC QN AUTO: 30.3 PG (ref 26.6–33)
MCHC RBC AUTO-ENTMCNC: 33.1 G/DL (ref 31.5–35.7)
MCV RBC AUTO: 91.6 FL (ref 79–97)
MONOCYTES # BLD AUTO: 0.56 10*3/MM3 (ref 0.1–0.9)
MONOCYTES NFR BLD AUTO: 5.4 % (ref 5–12)
NEUTROPHILS NFR BLD AUTO: 5.72 10*3/MM3 (ref 1.7–7)
NEUTROPHILS NFR BLD AUTO: 54.6 % (ref 42.7–76)
NITRITE UR QL STRIP: NEGATIVE
NRBC BLD AUTO-RTO: 0 /100 WBC (ref 0–0.2)
PH UR STRIP.AUTO: 7 [PH] (ref 5–8)
PLATELET # BLD AUTO: 354 10*3/MM3 (ref 140–450)
PMV BLD AUTO: 10.2 FL (ref 6–12)
POTASSIUM SERPL-SCNC: 3.4 MMOL/L (ref 3.5–5.2)
PROT SERPL-MCNC: 7.1 G/DL (ref 6–8.5)
PROT UR QL STRIP: NEGATIVE
RBC # BLD AUTO: 3.93 10*6/MM3 (ref 3.77–5.28)
RBC # UR STRIP: ABNORMAL /HPF
REF LAB TEST METHOD: ABNORMAL
SODIUM SERPL-SCNC: 138 MMOL/L (ref 136–145)
SP GR UR STRIP: 1.01 (ref 1–1.03)
SQUAMOUS #/AREA URNS HPF: ABNORMAL /HPF
UROBILINOGEN UR QL STRIP: ABNORMAL
WBC # UR STRIP: ABNORMAL /HPF
WBC NRBC COR # BLD: 10.46 10*3/MM3 (ref 3.4–10.8)
WHOLE BLOOD HOLD COAG: NORMAL
WHOLE BLOOD HOLD SPECIMEN: NORMAL

## 2023-07-31 PROCEDURE — 25010000002 DROPERIDOL PER 5 MG: Performed by: EMERGENCY MEDICINE

## 2023-07-31 PROCEDURE — 96375 TX/PRO/DX INJ NEW DRUG ADDON: CPT

## 2023-07-31 PROCEDURE — 74177 CT ABD & PELVIS W/CONTRAST: CPT

## 2023-07-31 PROCEDURE — 25010000002 ONDANSETRON PER 1 MG: Performed by: EMERGENCY MEDICINE

## 2023-07-31 PROCEDURE — 83690 ASSAY OF LIPASE: CPT | Performed by: EMERGENCY MEDICINE

## 2023-07-31 PROCEDURE — 96374 THER/PROPH/DIAG INJ IV PUSH: CPT

## 2023-07-31 PROCEDURE — 36415 COLL VENOUS BLD VENIPUNCTURE: CPT

## 2023-07-31 PROCEDURE — 25510000001 IOPAMIDOL PER 1 ML: Performed by: EMERGENCY MEDICINE

## 2023-07-31 PROCEDURE — 85025 COMPLETE CBC W/AUTO DIFF WBC: CPT

## 2023-07-31 PROCEDURE — 25010000002 HYDROMORPHONE 1 MG/ML SOLUTION: Performed by: EMERGENCY MEDICINE

## 2023-07-31 PROCEDURE — 25010000002 ONDANSETRON PER 1 MG: Performed by: REGISTERED NURSE

## 2023-07-31 PROCEDURE — 25010000002 KETOROLAC TROMETHAMINE PER 15 MG: Performed by: REGISTERED NURSE

## 2023-07-31 PROCEDURE — 80053 COMPREHEN METABOLIC PANEL: CPT | Performed by: EMERGENCY MEDICINE

## 2023-07-31 PROCEDURE — 99285 EMERGENCY DEPT VISIT HI MDM: CPT

## 2023-07-31 PROCEDURE — 83605 ASSAY OF LACTIC ACID: CPT

## 2023-07-31 PROCEDURE — 81001 URINALYSIS AUTO W/SCOPE: CPT | Performed by: EMERGENCY MEDICINE

## 2023-07-31 PROCEDURE — 96376 TX/PRO/DX INJ SAME DRUG ADON: CPT

## 2023-07-31 PROCEDURE — 25010000002 DIPHENHYDRAMINE PER 50 MG: Performed by: EMERGENCY MEDICINE

## 2023-07-31 RX ORDER — ONDANSETRON 4 MG/1
4 TABLET, ORALLY DISINTEGRATING ORAL EVERY 8 HOURS PRN
Qty: 12 TABLET | Refills: 0 | Status: SHIPPED | OUTPATIENT
Start: 2023-07-31

## 2023-07-31 RX ORDER — FAMOTIDINE 20 MG/1
20 TABLET, FILM COATED ORAL 2 TIMES DAILY
Qty: 20 TABLET | Refills: 0 | Status: SHIPPED | OUTPATIENT
Start: 2023-07-31

## 2023-07-31 RX ORDER — DROPERIDOL 2.5 MG/ML
2.5 INJECTION, SOLUTION INTRAMUSCULAR; INTRAVENOUS ONCE
Status: COMPLETED | OUTPATIENT
Start: 2023-07-31 | End: 2023-07-31

## 2023-07-31 RX ORDER — KETOROLAC TROMETHAMINE 30 MG/ML
15 INJECTION, SOLUTION INTRAMUSCULAR; INTRAVENOUS ONCE
Status: COMPLETED | OUTPATIENT
Start: 2023-07-31 | End: 2023-07-31

## 2023-07-31 RX ORDER — SODIUM CHLORIDE 0.9 % (FLUSH) 0.9 %
10 SYRINGE (ML) INJECTION AS NEEDED
Status: DISCONTINUED | OUTPATIENT
Start: 2023-07-31 | End: 2023-07-31 | Stop reason: HOSPADM

## 2023-07-31 RX ORDER — ONDANSETRON 2 MG/ML
4 INJECTION INTRAMUSCULAR; INTRAVENOUS ONCE
Status: COMPLETED | OUTPATIENT
Start: 2023-07-31 | End: 2023-07-31

## 2023-07-31 RX ORDER — DIPHENHYDRAMINE HYDROCHLORIDE 50 MG/ML
25 INJECTION INTRAMUSCULAR; INTRAVENOUS ONCE
Status: COMPLETED | OUTPATIENT
Start: 2023-07-31 | End: 2023-07-31

## 2023-07-31 RX ORDER — DICYCLOMINE HCL 20 MG
TABLET ORAL
Qty: 15 TABLET | Refills: 0 | Status: SHIPPED | OUTPATIENT
Start: 2023-07-31

## 2023-07-31 RX ADMIN — SODIUM CHLORIDE 1000 ML: 9 INJECTION, SOLUTION INTRAVENOUS at 17:23

## 2023-07-31 RX ADMIN — SODIUM CHLORIDE 1000 ML: 9 INJECTION, SOLUTION INTRAVENOUS at 16:14

## 2023-07-31 RX ADMIN — IOPAMIDOL 100 ML: 755 INJECTION, SOLUTION INTRAVENOUS at 17:52

## 2023-07-31 RX ADMIN — DROPERIDOL 2.5 MG: 2.5 INJECTION, SOLUTION INTRAMUSCULAR; INTRAVENOUS at 17:55

## 2023-07-31 RX ADMIN — ONDANSETRON 4 MG: 2 INJECTION INTRAMUSCULAR; INTRAVENOUS at 16:16

## 2023-07-31 RX ADMIN — ONDANSETRON 4 MG: 2 INJECTION INTRAMUSCULAR; INTRAVENOUS at 17:24

## 2023-07-31 RX ADMIN — HYDROMORPHONE HYDROCHLORIDE 1 MG: 1 INJECTION, SOLUTION INTRAMUSCULAR; INTRAVENOUS; SUBCUTANEOUS at 17:25

## 2023-07-31 RX ADMIN — KETOROLAC TROMETHAMINE 15 MG: 30 INJECTION, SOLUTION INTRAMUSCULAR; INTRAVENOUS at 16:17

## 2023-07-31 RX ADMIN — DIPHENHYDRAMINE HYDROCHLORIDE 25 MG: 50 INJECTION INTRAMUSCULAR; INTRAVENOUS at 17:57

## 2023-07-31 NOTE — PRE-PROCEDURE INSTRUCTIONS
"Instructed on date and arrival time of 0630. Come to entrance \"C\".  Must have  over age 18 to drive home.  May have two visitors; however, children under 12 must stay in waiting room.  Discussed diet/NPO.  May take medications as usual except for blood thinners, diabetic medications, or weight loss medications.  Bring list of medications to hospital.  Verbalized understanding of instructions given.  Phone number given for questions or concerns.  "

## 2023-08-08 ENCOUNTER — ANESTHESIA (OUTPATIENT)
Dept: GASTROENTEROLOGY | Facility: HOSPITAL | Age: 58
End: 2023-08-08
Payer: COMMERCIAL

## 2023-08-08 ENCOUNTER — APPOINTMENT (OUTPATIENT)
Dept: GENERAL RADIOLOGY | Facility: HOSPITAL | Age: 58
End: 2023-08-08
Payer: COMMERCIAL

## 2023-08-08 ENCOUNTER — ANESTHESIA EVENT (OUTPATIENT)
Dept: GASTROENTEROLOGY | Facility: HOSPITAL | Age: 58
End: 2023-08-08
Payer: COMMERCIAL

## 2023-08-08 ENCOUNTER — HOSPITAL ENCOUNTER (OUTPATIENT)
Facility: HOSPITAL | Age: 58
Setting detail: HOSPITAL OUTPATIENT SURGERY
Discharge: HOME | End: 2023-08-08
Attending: INTERNAL MEDICINE | Admitting: INTERNAL MEDICINE
Payer: COMMERCIAL

## 2023-08-08 VITALS
DIASTOLIC BLOOD PRESSURE: 68 MMHG | BODY MASS INDEX: 30.12 KG/M2 | OXYGEN SATURATION: 98 % | SYSTOLIC BLOOD PRESSURE: 114 MMHG | RESPIRATION RATE: 16 BRPM | HEIGHT: 65 IN | WEIGHT: 180.78 LBS | TEMPERATURE: 98 F | HEART RATE: 75 BPM

## 2023-08-08 DIAGNOSIS — Z46.89 ENCOUNTER FOR REMOVAL OF BILIARY STENT: ICD-10-CM

## 2023-08-08 PROCEDURE — 76000 FLUOROSCOPY <1 HR PHYS/QHP: CPT

## 2023-08-08 PROCEDURE — 88104 CYTOPATH FL NONGYN SMEARS: CPT | Performed by: INTERNAL MEDICINE

## 2023-08-08 PROCEDURE — 25510000001 IOPAMIDOL 61 % SOLUTION: Performed by: INTERNAL MEDICINE

## 2023-08-08 PROCEDURE — 25010000002 PROPOFOL 10 MG/ML EMULSION: Performed by: NURSE ANESTHETIST, CERTIFIED REGISTERED

## 2023-08-08 PROCEDURE — C1769 GUIDE WIRE: HCPCS | Performed by: INTERNAL MEDICINE

## 2023-08-08 RX ORDER — KETAMINE HCL IN NACL, ISO-OSM 100MG/10ML
SYRINGE (ML) INJECTION AS NEEDED
Status: DISCONTINUED | OUTPATIENT
Start: 2023-08-08 | End: 2023-08-08 | Stop reason: SURG

## 2023-08-08 RX ORDER — SODIUM CHLORIDE, SODIUM LACTATE, POTASSIUM CHLORIDE, CALCIUM CHLORIDE 600; 310; 30; 20 MG/100ML; MG/100ML; MG/100ML; MG/100ML
30 INJECTION, SOLUTION INTRAVENOUS CONTINUOUS
Status: DISCONTINUED | OUTPATIENT
Start: 2023-08-08 | End: 2023-08-08 | Stop reason: HOSPADM

## 2023-08-08 RX ORDER — PROPOFOL 10 MG/ML
VIAL (ML) INTRAVENOUS AS NEEDED
Status: DISCONTINUED | OUTPATIENT
Start: 2023-08-08 | End: 2023-08-08 | Stop reason: SURG

## 2023-08-08 RX ORDER — LIDOCAINE HYDROCHLORIDE 20 MG/ML
INJECTION, SOLUTION EPIDURAL; INFILTRATION; INTRACAUDAL; PERINEURAL AS NEEDED
Status: DISCONTINUED | OUTPATIENT
Start: 2023-08-08 | End: 2023-08-08 | Stop reason: SURG

## 2023-08-08 RX ADMIN — Medication 10 MG: at 08:40

## 2023-08-08 RX ADMIN — Medication 10 MG: at 08:49

## 2023-08-08 RX ADMIN — Medication 10 MG: at 08:30

## 2023-08-08 RX ADMIN — PROPOFOL 50 MG: 10 INJECTION, EMULSION INTRAVENOUS at 08:27

## 2023-08-08 RX ADMIN — SODIUM CHLORIDE, POTASSIUM CHLORIDE, SODIUM LACTATE AND CALCIUM CHLORIDE 30 ML/HR: 600; 310; 30; 20 INJECTION, SOLUTION INTRAVENOUS at 08:19

## 2023-08-08 RX ADMIN — Medication 10 MG: at 08:36

## 2023-08-08 RX ADMIN — PROPOFOL 250 MCG/KG/MIN: 10 INJECTION, EMULSION INTRAVENOUS at 08:27

## 2023-08-08 RX ADMIN — LIDOCAINE HYDROCHLORIDE 40 MG: 20 INJECTION, SOLUTION EPIDURAL; INFILTRATION; INTRACAUDAL; PERINEURAL at 08:27

## 2023-08-08 NOTE — ANESTHESIA PREPROCEDURE EVALUATION
Anesthesia Evaluation     Patient summary reviewed and Nursing notes reviewed   no history of anesthetic complications:   NPO Solid Status: > 8 hours  NPO Liquid Status: > 2 hours           Airway   Mallampati: II  TM distance: >3 FB  Neck ROM: full  No difficulty expected  Dental    (+) partials    Comment: Several missing      Pulmonary - normal exam    breath sounds clear to auscultation  (+) a smoker Current Abstained day of surgery,  Cardiovascular - normal exam  Exercise tolerance: good (4-7 METS)    Rhythm: regular  Rate: normal    (+) hypertension well controlled 2 medications or greater      Neuro/Psych  (+) psychiatric history Anxiety and PTSD  GI/Hepatic/Renal/Endo    (+) obesity, renal disease stones    Musculoskeletal (-) negative ROS    Abdominal    Substance History - negative use     OB/GYN negative ob/gyn ROS         Other - negative ROS       ROS/Med Hx Other: PAT Nursing Notes unavailable.                   Anesthesia Plan    ASA 2     general     (Patient understands anesthesia not responsible for dental damage.)  intravenous induction     Anesthetic plan, risks, benefits, and alternatives have been provided, discussed and informed consent has been obtained with: patient.  Pre-procedure education provided  Use of blood products discussed with patient  Consented to blood products.    Plan discussed with CRNA.      CODE STATUS:

## 2023-08-08 NOTE — H&P
Pre Procedure History & Physical    Chief Complaint:   Bile duct stone previous ERCP and stent placement    Subjective     HPI:   As above    Past Medical History:   Past Medical History:   Diagnosis Date    Anxiety     Calculus of gallbladder 2023    Endometriosis     Hypertension     Kidney stone     Other insomnia     PTSD (post-traumatic stress disorder)        Past Surgical History:  Past Surgical History:   Procedure Laterality Date     SECTION      x3    CHOLECYSTECTOMY N/A 2023    Procedure: CHOLECYSTECTOMY LAPAROSCOPIC, INTRAOPERATIVE CHOLANGIOGRAM;  Surgeon: Marbella Razo MD;  Location: [facility] MAIN OR;  Service: General;  Laterality: N/A;    ERCP N/A 2023    Procedure: ENDOSCOPIC RETROGRADE CHOLANGIOPANCREATOGRAPHY WITH BALLOON SWEEP, SPHINCTEROTOMY AND STENT PLACEMENT;  Surgeon: Rush Lee MD;  Location: [facility] ENDOSCOPY;  Service: Gastroenterology;  Laterality: N/A;  BILE DUCT STONE    KIDNEY STONE SURGERY      OTHER SURGICAL HISTORY      Endometriosis       Family History:  Family History   Adopted: Yes       Social History:   reports that she has been smoking cigarettes. She has a 9.25 pack-year smoking history. She has never used smokeless tobacco. She reports that she does not drink alcohol and does not use drugs.    Medications:   Medications Prior to Admission   Medication Sig Dispense Refill Last Dose    amLODIPine-benazepril (Lotrel) 10-20 MG per capsule Take 1 capsule by mouth Daily. 90 capsule 2 2023    chlorthalidone (HYGROTON) 25 MG tablet Take 1 tablet by mouth Daily. 90 tablet 2 Past Week    dicyclomine (BENTYL) 20 MG tablet Take 1 p.o. 3 times daily as needed abdominal pain or cramping. 15 tablet 0 2023    famotidine (PEPCID) 20 MG tablet Take 1 tablet by mouth 2 (Two) Times a Day. 20 tablet 0 2023    gabapentin (NEURONTIN) 300 MG capsule Take 1 capsule by mouth 4 (Four) Times a Day. 120 capsule 0 Past Week    ibuprofen (ADVIL,MOTRIN)  "800 MG tablet Take 1 tablet by mouth Every 8 (Eight) Hours As Needed.   8/7/2023    ondansetron ODT (ZOFRAN-ODT) 4 MG disintegrating tablet Place 1 tablet on the tongue Every 8 (Eight) Hours As Needed for Nausea or Vomiting. 10 tablet 0 Past Month    traZODone (DESYREL) 50 MG tablet Take 1 tablet by mouth Every Night. 90 tablet 1 Past Week    albuterol sulfate  (90 Base) MCG/ACT inhaler Inhale 2 puffs Every 4 (Four) Hours As Needed for Wheezing or Shortness of Air. 18 g 2 More than a month       Allergies:  Patient has no known allergies.        Objective     Blood pressure 128/81, pulse 93, temperature 97.8 øF (36.6 øC), temperature source Temporal, resp. rate 18, height 165.1 cm (65\"), weight 82 kg (180 lb 12.4 oz), SpO2 98 %.    Physical Exam   Constitutional: Pt is oriented to person, place, and time and well-developed, well-nourished, and in no distress.   Mouth/Throat: Oropharynx is clear and moist.   Neck: Normal range of motion.   Cardiovascular: Normal rate, regular rhythm and normal heart sounds.    Pulmonary/Chest: Effort normal and breath sounds normal.   Abdominal: Soft. Nontender  Skin: Skin is warm and dry.   Psychiatric: Mood, memory, affect and judgment normal.     Assessment & Plan     Diagnosis:  History of bile duct stone and stent    Anticipated Surgical Procedure:  ERCP    The risks, benefits, and alternatives of this procedure have been discussed with the patient or the responsible party- the patient understands and agrees to proceed.            "

## 2023-08-08 NOTE — ANESTHESIA POSTPROCEDURE EVALUATION
Patient: Vita Cuevas    Procedure Summary       Date: 08/08/23 Room / Location: [facility] ENDOSCOPY 4 / [facility] ENDOSCOPY    Anesthesia Start: 0826 Anesthesia Stop: 0908    Procedure: ENDOSCOPIC RETROGRADE CHOLANGIOPANCREATOGRAPHY WITH STENT REMOVAL, BALLOON SWEEP AND BRUSHINGS Diagnosis:       Encounter for removal of biliary stent      (Encounter for removal of biliary stent [Z46.89])    Surgeons: Rush Lee MD Provider: Reyes, Mirabelle, DO    Anesthesia Type: general ASA Status: 2            Anesthesia Type: general    Vitals  Vitals Value Taken Time   /72 08/08/23 0926   Temp 36.7 øC (98 øF) 08/08/23 0905   Pulse 76 08/08/23 0936   Resp 16 08/08/23 0925   SpO2 100 % 08/08/23 0936   Vitals shown include unvalidated device data.        Post Anesthesia Care and Evaluation    Patient location during evaluation: bedside  Patient participation: complete - patient participated  Level of consciousness: awake  Pain management: adequate    Airway patency: patent  PONV Status: none  Cardiovascular status: acceptable and stable  Respiratory status: acceptable  Hydration status: acceptable    Comments: An Anesthesiologist personally participated in the most demanding procedures (including induction and emergence if applicable) in the anesthesia plan, monitored the course of anesthesia administration at frequent intervals and remained physically present and available for immediate diagnosis and treatment of emergencies.

## 2023-08-09 ENCOUNTER — OFFICE VISIT (OUTPATIENT)
Dept: INTERNAL MEDICINE | Facility: CLINIC | Age: 58
End: 2023-08-09
Payer: COMMERCIAL

## 2023-08-09 VITALS
DIASTOLIC BLOOD PRESSURE: 91 MMHG | WEIGHT: 180.4 LBS | HEIGHT: 65 IN | BODY MASS INDEX: 30.06 KG/M2 | TEMPERATURE: 98 F | SYSTOLIC BLOOD PRESSURE: 136 MMHG | OXYGEN SATURATION: 97 % | RESPIRATION RATE: 18 BRPM | HEART RATE: 75 BPM

## 2023-08-09 DIAGNOSIS — Z90.49 S/P LAPAROSCOPIC CHOLECYSTECTOMY: ICD-10-CM

## 2023-08-09 DIAGNOSIS — M54.41 CHRONIC RIGHT-SIDED LOW BACK PAIN WITH RIGHT-SIDED SCIATICA: ICD-10-CM

## 2023-08-09 DIAGNOSIS — E87.6 HYPOKALEMIA: ICD-10-CM

## 2023-08-09 DIAGNOSIS — G89.29 CHRONIC RIGHT-SIDED LOW BACK PAIN WITH RIGHT-SIDED SCIATICA: ICD-10-CM

## 2023-08-09 DIAGNOSIS — K80.01 CALCULUS OF GALLBLADDER WITH ACUTE CHOLECYSTITIS AND OBSTRUCTION: ICD-10-CM

## 2023-08-09 DIAGNOSIS — R10.9 ABDOMINAL PAIN IN FEMALE: ICD-10-CM

## 2023-08-09 DIAGNOSIS — I10 ESSENTIAL HYPERTENSION: ICD-10-CM

## 2023-08-09 DIAGNOSIS — Z98.890 S/P ERCP: Primary | ICD-10-CM

## 2023-08-09 LAB — MAGNESIUM SERPL-MCNC: 2.1 MG/DL (ref 1.6–2.6)

## 2023-08-09 PROCEDURE — 83735 ASSAY OF MAGNESIUM: CPT | Performed by: STUDENT IN AN ORGANIZED HEALTH CARE EDUCATION/TRAINING PROGRAM

## 2023-08-09 PROCEDURE — 80053 COMPREHEN METABOLIC PANEL: CPT | Performed by: STUDENT IN AN ORGANIZED HEALTH CARE EDUCATION/TRAINING PROGRAM

## 2023-08-09 NOTE — PROGRESS NOTES
"Chief Complaint  Follow-up (Pt is following up from hospital visit ) and Med Refill    Subjective          Hope Serena Cuevas presents to [facility]  History of Present Illness    Had CCK in the interim.  Had second ERCP yesterday, where stent was removed and sample sent for pathology.    Since stent removed, \"I feel one hundred times better.\"  She denies abdominal pain, chest pain, etc.  Has work not to return to work today, but work is asking for additional paperwork.  She drives a forklift.    Last took potassium Monday.  Did not take her chronic meds this morning.      Current Outpatient Medications   Medication Instructions    albuterol sulfate  (90 Base) MCG/ACT inhaler 2 puffs, Inhalation, Every 4 Hours PRN    amLODIPine-benazepril (Lotrel) 10-20 MG per capsule 1 capsule, Oral, Daily    chlorthalidone (HYGROTON) 25 mg, Oral, Daily    dicyclomine (BENTYL) 20 MG tablet Take 1 p.o. 3 times daily as needed abdominal pain or cramping.    famotidine (PEPCID) 20 mg, Oral, 2 Times Daily    gabapentin (NEURONTIN) 300 mg, Oral, 4 Times Daily    ibuprofen (ADVIL,MOTRIN) 800 MG tablet Take 1 tablet by mouth Every 8 (Eight) Hours As Needed.    ondansetron ODT (ZOFRAN-ODT) 4 mg, Translingual, Every 8 Hours PRN    traZODone (DESYREL) 50 mg, Oral, Nightly       The following portions of the patient's history were reviewed and updated as appropriate: allergies, current medications, past family history, past medical history, past social history, past surgical history, and problem list.    Objective   Vital Signs:   /91 (BP Location: Left arm, Patient Position: Sitting, Cuff Size: Adult)   Pulse 75   Temp 98 øF (36.7 øC) (Temporal)   Resp 18   Ht 165.1 cm (65\")   Wt 81.8 kg (180 lb 6.4 oz)   SpO2 97%   BMI 30.02 kg/mý     BP Readings from Last 3 Encounters:   08/09/23 136/91   08/08/23 114/68   07/31/23 144/40     Wt Readings from Last 3 Encounters: "   08/09/23 81.8 kg (180 lb 6.4 oz)   08/08/23 82 kg (180 lb 12.4 oz)   07/31/23 84.8 kg (186 lb 15.2 oz)         Physical Exam  Vitals reviewed.   Constitutional:       General: She is not in acute distress.     Appearance: Normal appearance. She is not ill-appearing, toxic-appearing or diaphoretic.   HENT:      Head: Normocephalic and atraumatic.      Right Ear: External ear normal.      Left Ear: External ear normal.   Eyes:      Conjunctiva/sclera: Conjunctivae normal.   Cardiovascular:      Rate and Rhythm: Normal rate and regular rhythm.      Pulses: Normal pulses.      Heart sounds: Normal heart sounds. No murmur heard.    No friction rub. No gallop.   Pulmonary:      Effort: Pulmonary effort is normal. No respiratory distress.      Breath sounds: Normal breath sounds. No stridor. No wheezing, rhonchi or rales.   Chest:      Chest wall: No tenderness.   Abdominal:      General: Abdomen is flat.      Palpations: Abdomen is soft. There is no mass.      Tenderness: There is no abdominal tenderness.   Musculoskeletal:      Right lower leg: No edema.      Left lower leg: No edema.   Skin:     General: Skin is warm and dry.   Neurological:      General: No focal deficit present.      Mental Status: She is alert. Mental status is at baseline.   Psychiatric:         Mood and Affect: Mood normal.         Behavior: Behavior normal.         Thought Content: Thought content normal.         Judgment: Judgment normal.            Result Review :   The following data was reviewed by: Thierno Li MD on 08/09/2023:  Common labs          7/19/2023    06:04 7/25/2023    14:07 7/31/2023    15:09   Common Labs   Glucose 91  101  86    BUN 15  9  10    Creatinine 0.70  0.76  0.74    Sodium 142  137  138    Potassium 2.9  3.1  3.4    Chloride 108  99  104    Calcium 8.3  9.8  9.4    Albumin 3.2  4.2  4.1    Total Bilirubin 0.5  0.5  0.4    Alkaline Phosphatase 95  128  114    AST (SGOT) 128  24  20    ALT (SGPT) 270  66  33     WBC   10.46    Hemoglobin   11.9    Hematocrit   36.0    Platelets   354             Lab Results   Component Value Date    SARSANTIGEN Not Detected 03/10/2023    COVID19 Not Detected 03/10/2023    RAPFLUA Negative 02/17/2023    RAPFLUB Negative 02/17/2023    FLUAAG Not Detected 03/10/2023    FLUBAG Not Detected 03/10/2023    RAPSCRN Negative 02/17/2023    INR 1.03 07/18/2023    BILIRUBINUR Negative 07/31/2023       Procedures        Assessment and Plan    Diagnoses and all orders for this visit:    1. S/P ERCP (Primary)  -     Comprehensive Metabolic Panel    2. S/P laparoscopic cholecystectomy    3. Calculus of gallbladder with acute cholecystitis and obstruction    4. Hypokalemia  -     Comprehensive Metabolic Panel  -     Magnesium    5. Chronic right-sided low back pain with right-sided sciatica    6. Abdominal pain in female    7. Essential hypertension      Ab Pain:  -ab pain resolved  -s/p CCK and ERCP with stent replacement and later removal  -path pending    Hypokalemia:  -will re-check labs today    HTN:  -didn't take meds today  -I encouraged her to take meds more consistently    There are no discontinued medications.       Follow Up   Return in about 3 months (around 11/9/2023) for HTN.  Patient was given instructions and counseling regarding her condition or for health maintenance advice. Please see specific information pulled into the AVS if appropriate.       Thierno Li MD  08/09/23  12:12 EDT

## 2023-08-10 ENCOUNTER — OFFICE VISIT (OUTPATIENT)
Dept: SURGERY | Facility: CLINIC | Age: 58
End: 2023-08-10
Payer: COMMERCIAL

## 2023-08-10 VITALS — HEIGHT: 65 IN | WEIGHT: 180 LBS | BODY MASS INDEX: 29.99 KG/M2 | RESPIRATION RATE: 18 BRPM

## 2023-08-10 DIAGNOSIS — M54.41 CHRONIC RIGHT-SIDED LOW BACK PAIN WITH RIGHT-SIDED SCIATICA: ICD-10-CM

## 2023-08-10 DIAGNOSIS — Z90.49 S/P LAPAROSCOPIC CHOLECYSTECTOMY: Primary | ICD-10-CM

## 2023-08-10 DIAGNOSIS — K80.50 COMMON BILE DUCT STONE: Primary | ICD-10-CM

## 2023-08-10 DIAGNOSIS — G89.29 CHRONIC RIGHT-SIDED LOW BACK PAIN WITH RIGHT-SIDED SCIATICA: ICD-10-CM

## 2023-08-10 LAB
ALBUMIN SERPL-MCNC: 4.8 G/DL (ref 3.5–5.2)
ALBUMIN/GLOB SERPL: 1.5 G/DL
ALP SERPL-CCNC: 121 U/L (ref 39–117)
ALT SERPL W P-5'-P-CCNC: 35 U/L (ref 1–33)
ANION GAP SERPL CALCULATED.3IONS-SCNC: 14 MMOL/L (ref 5–15)
AST SERPL-CCNC: 28 U/L (ref 1–32)
BILIRUB SERPL-MCNC: 0.7 MG/DL (ref 0–1.2)
BUN SERPL-MCNC: 22 MG/DL (ref 6–20)
BUN/CREAT SERPL: 23.2 (ref 7–25)
CALCIUM SPEC-SCNC: 10.4 MG/DL (ref 8.6–10.5)
CHLORIDE SERPL-SCNC: 102 MMOL/L (ref 98–107)
CO2 SERPL-SCNC: 23 MMOL/L (ref 22–29)
CREAT SERPL-MCNC: 0.95 MG/DL (ref 0.57–1)
CYTO UR: NORMAL
EGFRCR SERPLBLD CKD-EPI 2021: 70.5 ML/MIN/1.73
GLOBULIN UR ELPH-MCNC: 3.1 GM/DL
GLUCOSE SERPL-MCNC: 78 MG/DL (ref 65–99)
LAB AP CASE REPORT: NORMAL
LAB AP CLINICAL INFORMATION: NORMAL
PATH REPORT.FINAL DX SPEC: NORMAL
PATH REPORT.GROSS SPEC: NORMAL
POTASSIUM SERPL-SCNC: 3.7 MMOL/L (ref 3.5–5.2)
PROT SERPL-MCNC: 7.9 G/DL (ref 6–8.5)
SODIUM SERPL-SCNC: 139 MMOL/L (ref 136–145)

## 2023-08-10 PROCEDURE — 99024 POSTOP FOLLOW-UP VISIT: CPT | Performed by: SURGERY

## 2023-08-10 RX ORDER — GABAPENTIN 300 MG/1
300 CAPSULE ORAL 4 TIMES DAILY
Qty: 120 CAPSULE | Refills: 0 | Status: SHIPPED | OUTPATIENT
Start: 2023-08-10

## 2023-08-10 NOTE — TELEPHONE ENCOUNTER
Refill Request for Controlled Substance    Date of Request: 8/10/2023  Medication Requested: Gabapentin   Last UDS: 6/16/23  Last Consent: 6/16/23  Last OV: 8/9/23  Next OV: 11/9/23

## 2023-08-10 NOTE — PROGRESS NOTES
"Chief Complaint  Follow-up    Subjective          History of Present Illness  The patient is here to follow up on laparoscopic cholecystectomy.  They are doing well and have no complaints.  Pathology is shown below:       Clinical Information    Choledocholithiasis with acute cholecystitis   Final Diagnosis   Gallbladder, cholecystectomy:               - Acute and chronic cholecystitis               - Cholesterolosis               - Cholelithiasis        Objective   Vital Signs:   Resp 18   Ht 165.1 cm (65\")   Wt 81.6 kg (180 lb)   BMI 29.95 kg/mý     Physical Exam  Vitals and nursing note reviewed.   Constitutional:       General: She is not in acute distress.     Appearance: Normal appearance. She is well-developed.   HENT:      Head: Normocephalic and atraumatic.   Eyes:      Extraocular Movements: Extraocular movements intact.      Pupils: Pupils are equal, round, and reactive to light.   Cardiovascular:      Pulses: Normal pulses.   Pulmonary:      Effort: Pulmonary effort is normal. No retractions.      Breath sounds: Normal air entry. No wheezing.   Abdominal:      General: There is no distension.      Palpations: Abdomen is soft.      Tenderness: There is no abdominal tenderness.      Hernia: No hernia is present.   Musculoskeletal:         General: No swelling or deformity.      Cervical back: Neck supple.   Skin:     General: Skin is warm and dry.      Findings: No erythema.      Comments: Surgical Incision Healing Well   Neurological:      General: No focal deficit present.      Mental Status: She is alert and oriented to person, place, and time.      Motor: Motor function is intact.   Psychiatric:         Mood and Affect: Mood normal.         Thought Content: Thought content normal.          Result Review :                 Assessment and Plan    Diagnoses and all orders for this visit:    1. S/P laparoscopic cholecystectomy (Primary)    Followup prn    Follow Up   Return if symptoms worsen or fail to " improve.  Patient was given instructions and counseling regarding her condition or for health maintenance advice. Please see specific information pulled into the AVS if appropriate.   Answers submitted by the patient for this visit:  Other (Submitted on 8/9/2023)  Please describe your symptoms.: After surgery check up  Have you had these symptoms before?: No  How long have you been having these symptoms?: Greater than 2 weeks  Primary Reason for Visit (Submitted on 8/9/2023)  What is the primary reason for your visit?: Other

## 2023-08-10 NOTE — PROGRESS NOTES
ERCP reviewed-stent removed biliary stricture noted appearing benign, bile duct brushings are benign, Dr. Lee recommends follow-up in 1 month in the office and MRCP and LFTs before Follow-up

## 2023-08-16 ENCOUNTER — TELEPHONE (OUTPATIENT)
Dept: INTERNAL MEDICINE | Facility: CLINIC | Age: 58
End: 2023-08-16
Payer: COMMERCIAL

## 2023-08-16 ENCOUNTER — PATIENT MESSAGE (OUTPATIENT)
Dept: INTERNAL MEDICINE | Facility: CLINIC | Age: 58
End: 2023-08-16
Payer: COMMERCIAL

## 2023-08-16 DIAGNOSIS — F40.240 CLAUSTROPHOBIA: Primary | ICD-10-CM

## 2023-08-16 RX ORDER — DIAZEPAM 5 MG/1
TABLET ORAL
Qty: 1 TABLET | Refills: 0 | Status: SHIPPED | OUTPATIENT
Start: 2023-08-16

## 2023-08-16 NOTE — TELEPHONE ENCOUNTER
"From: Vita Cuevas  To: Thierno Li  Sent: 8/16/2023 11:09 AM EDT  Subject: MRI abdomen    Yes I was just scheduled an MRI of my abdomen I guess it was a referral. They asked me if I was claustrophobic? The answer to that is extremely. They said to let you know and ask for a "happy pill" to take 30 min before my appointment?. It is Sept 19th at 7 am. I have to be there at 6:45am. I have a . Just letting you know what I was told to do as far as the claustrophobia. Thank you. Vita Cuevas   "

## 2023-09-07 DIAGNOSIS — M54.41 CHRONIC RIGHT-SIDED LOW BACK PAIN WITH RIGHT-SIDED SCIATICA: ICD-10-CM

## 2023-09-07 DIAGNOSIS — G89.29 CHRONIC RIGHT-SIDED LOW BACK PAIN WITH RIGHT-SIDED SCIATICA: ICD-10-CM

## 2023-09-07 RX ORDER — GABAPENTIN 300 MG/1
CAPSULE ORAL
Qty: 120 CAPSULE | Refills: 2 | Status: SHIPPED | OUTPATIENT
Start: 2023-09-07

## 2023-09-07 NOTE — TELEPHONE ENCOUNTER
Refill request for  controlled substance.      Date of request: 9/7/2023    Medication requested: Gabapentin  Last OV: 8/9/23  Last UDS: 6/16/23  Contract signed: yes    Date:6/16/23  Next office visit: 11/9/23    Gloria Block

## 2023-09-14 ENCOUNTER — TELEPHONE (OUTPATIENT)
Dept: INTERNAL MEDICINE | Facility: CLINIC | Age: 58
End: 2023-09-14
Payer: COMMERCIAL

## 2023-09-14 NOTE — TELEPHONE ENCOUNTER
Caller: Vita Cuevas    Relationship: Self    Best call back number:     [phone]       What medication are you requesting: ANTIBIOTIC    What are your current symptoms: GREEN MUCUS, CHEST CONGESTION, RUNNY NOSE, SNEEZING    How long have you been experiencing symptoms: SINCE 9/10/23    Have you had these symptoms before:    [] Yes  [] No    Have you been treated for these symptoms before:   [] Yes  [] No    If a prescription is needed, what is your preferred pharmacy and phone number: [id number] - [place], [place] - [address] AT [facility] [phone] [facility] [phone]      Additional notes: PATIENT WOULD LIKE FOR SOMEONE TO CALL HER

## 2023-09-21 ENCOUNTER — TELEPHONE (OUTPATIENT)
Dept: INTERNAL MEDICINE | Facility: CLINIC | Age: 58
End: 2023-09-21
Payer: COMMERCIAL

## 2023-09-21 ENCOUNTER — OFFICE VISIT (OUTPATIENT)
Dept: INTERNAL MEDICINE | Facility: CLINIC | Age: 58
End: 2023-09-21
Payer: COMMERCIAL

## 2023-09-21 VITALS
HEIGHT: 65 IN | SYSTOLIC BLOOD PRESSURE: 150 MMHG | BODY MASS INDEX: 32.26 KG/M2 | OXYGEN SATURATION: 95 % | DIASTOLIC BLOOD PRESSURE: 87 MMHG | WEIGHT: 193.6 LBS | TEMPERATURE: 99.3 F | RESPIRATION RATE: 20 BRPM | HEART RATE: 91 BPM

## 2023-09-21 DIAGNOSIS — I10 ESSENTIAL HYPERTENSION: ICD-10-CM

## 2023-09-21 DIAGNOSIS — J20.9 ACUTE BRONCHITIS, UNSPECIFIED ORGANISM: ICD-10-CM

## 2023-09-21 DIAGNOSIS — R05.1 ACUTE COUGH: Primary | ICD-10-CM

## 2023-09-21 LAB
EXPIRATION DATE: NORMAL
EXPIRATION DATE: NORMAL
FLUAV AG NPH QL: NEGATIVE
FLUAV AG UPPER RESP QL IA.RAPID: NOT DETECTED
FLUBV AG NPH QL: NEGATIVE
FLUBV AG UPPER RESP QL IA.RAPID: NOT DETECTED
INTERNAL CONTROL: NORMAL
INTERNAL CONTROL: NORMAL
Lab: NORMAL
Lab: NORMAL
SARS-COV-2 AG UPPER RESP QL IA.RAPID: NOT DETECTED
SARS-COV-2 RNA RESP QL NAA+PROBE: NOT DETECTED

## 2023-09-21 PROCEDURE — 87635 SARS-COV-2 COVID-19 AMP PRB: CPT | Performed by: STUDENT IN AN ORGANIZED HEALTH CARE EDUCATION/TRAINING PROGRAM

## 2023-09-21 RX ORDER — AZITHROMYCIN 250 MG/1
TABLET, FILM COATED ORAL
Qty: 6 TABLET | Refills: 0 | Status: SHIPPED | OUTPATIENT
Start: 2023-09-21

## 2023-09-21 RX ORDER — PREDNISONE 20 MG/1
40 TABLET ORAL DAILY
Qty: 10 TABLET | Refills: 0 | Status: SHIPPED | OUTPATIENT
Start: 2023-09-21 | End: 2023-09-26

## 2023-09-21 NOTE — TELEPHONE ENCOUNTER
----- Message from Vita Cuevas sent at 9/20/2023 11:27 PM EDT -----  Regarding: Appointment Request  Contact: [phone]  Appointment Request From: Vita Cuevas    With Provider: Thierno Li MD [[facility]]    Preferred Date Range: 9/21/2023 – 9/21/2023    Preferred Times: Thursday Morning    Reason for visit: Follow-up    Comments:  Have had sinus infection for 2 weeks breathing is hard congested I head and chest

## 2023-09-25 NOTE — ASSESSMENT & PLAN NOTE
Growing and developing well  Age appropriate anticipatory guidance regarding growth, development, nutrition, vaccination, and safety discussed and handout given to caregiver.     The patient is Stable - Low risk of patient condition declining or worsening    Shift Goals  Clinical Goals: wound care consult today  Patient Goals: keep pain managed enough to mobilize  Family Goals: Update    Progress made toward(s) clinical / shift goals:  wound consult completed and noted in chart.     Patient is not progressing towards the following goals:

## 2023-09-26 ENCOUNTER — TELEPHONE (OUTPATIENT)
Dept: GASTROENTEROLOGY | Facility: CLINIC | Age: 58
End: 2023-09-26
Payer: COMMERCIAL

## 2023-11-14 ENCOUNTER — TELEPHONE (OUTPATIENT)
Dept: INTERNAL MEDICINE | Facility: CLINIC | Age: 58
End: 2023-11-14
Payer: COMMERCIAL

## 2023-11-14 NOTE — TELEPHONE ENCOUNTER
Left msg after receiving an inbox msg from pt. Need to clarify type & dates for appt.  Pt is scheduled for a Phy with Pap.  She is going to discuss Mammo & Colonoscopy referrals with Dr Li at her appt.

## 2023-11-28 DIAGNOSIS — G89.29 CHRONIC RIGHT-SIDED LOW BACK PAIN WITH RIGHT-SIDED SCIATICA: ICD-10-CM

## 2023-11-28 DIAGNOSIS — M54.41 CHRONIC RIGHT-SIDED LOW BACK PAIN WITH RIGHT-SIDED SCIATICA: ICD-10-CM

## 2023-11-28 NOTE — TELEPHONE ENCOUNTER
Refill request for  controlled substance.      Date of request: 11/28/2023    Medication requested: Gabapentin  Last OV: 9/21/23  Last UDS: 6/16/23  Contract signed: yes    Date:6/16/23  Next office visit: 11/30/23    Gloria Block

## 2023-11-30 ENCOUNTER — OFFICE VISIT (OUTPATIENT)
Dept: INTERNAL MEDICINE | Facility: CLINIC | Age: 58
End: 2023-11-30
Payer: COMMERCIAL

## 2023-11-30 VITALS
HEIGHT: 65 IN | HEART RATE: 80 BPM | BODY MASS INDEX: 31.12 KG/M2 | DIASTOLIC BLOOD PRESSURE: 87 MMHG | WEIGHT: 186.8 LBS | TEMPERATURE: 97.4 F | SYSTOLIC BLOOD PRESSURE: 133 MMHG | OXYGEN SATURATION: 98 %

## 2023-11-30 DIAGNOSIS — F43.10 PTSD (POST-TRAUMATIC STRESS DISORDER): ICD-10-CM

## 2023-11-30 DIAGNOSIS — K21.9 GASTROESOPHAGEAL REFLUX DISEASE WITHOUT ESOPHAGITIS: ICD-10-CM

## 2023-11-30 DIAGNOSIS — M54.41 CHRONIC RIGHT-SIDED LOW BACK PAIN WITH RIGHT-SIDED SCIATICA: ICD-10-CM

## 2023-11-30 DIAGNOSIS — S23.41XA SPRAIN OF COSTAL CARTILAGE, INITIAL ENCOUNTER: ICD-10-CM

## 2023-11-30 DIAGNOSIS — F17.210 CIGARETTE NICOTINE DEPENDENCE WITHOUT COMPLICATION: ICD-10-CM

## 2023-11-30 DIAGNOSIS — Z12.11 COLON CANCER SCREENING: ICD-10-CM

## 2023-11-30 DIAGNOSIS — G89.29 CHRONIC RIGHT-SIDED LOW BACK PAIN WITH RIGHT-SIDED SCIATICA: ICD-10-CM

## 2023-11-30 DIAGNOSIS — Z79.899 MEDICATION MANAGEMENT: ICD-10-CM

## 2023-11-30 DIAGNOSIS — I10 ESSENTIAL HYPERTENSION: Primary | ICD-10-CM

## 2023-11-30 DIAGNOSIS — F41.9 ANXIETY: ICD-10-CM

## 2023-11-30 RX ORDER — BUPROPION HYDROCHLORIDE 150 MG/1
TABLET, EXTENDED RELEASE ORAL
Qty: 60 TABLET | Refills: 2 | Status: SHIPPED | OUTPATIENT
Start: 2023-11-30

## 2023-11-30 RX ORDER — GABAPENTIN 300 MG/1
300 CAPSULE ORAL 4 TIMES DAILY
Qty: 120 CAPSULE | Refills: 2 | Status: SHIPPED | OUTPATIENT
Start: 2023-11-30 | End: 2023-11-30 | Stop reason: SDUPTHER

## 2023-11-30 RX ORDER — NICOTINE 21 MG/24HR
1 PATCH, TRANSDERMAL 24 HOURS TRANSDERMAL EVERY 24 HOURS
Qty: 28 EACH | Refills: 1 | Status: SHIPPED | OUTPATIENT
Start: 2023-11-30

## 2023-11-30 RX ORDER — GABAPENTIN 300 MG/1
300 CAPSULE ORAL 4 TIMES DAILY
Qty: 120 CAPSULE | Refills: 2 | Status: SHIPPED | OUTPATIENT
Start: 2023-11-30

## 2023-11-30 RX ORDER — FAMOTIDINE 20 MG/1
20 TABLET, FILM COATED ORAL 2 TIMES DAILY
Qty: 180 TABLET | Refills: 2 | Status: SHIPPED | OUTPATIENT
Start: 2023-11-30

## 2023-11-30 NOTE — PROGRESS NOTES
"Chief Complaint  Hypertension (Follow up) and Breast Pain (Patient has a pain in her right breast, states she can't even sleep on that side )    Subjective          Hope Serena Cuevas presents to [facility]  History of Present Illness    In the last few weeks, has felt some pain around the ribs on right side.  Works as a .  She can lift objects as heavy as 50 lbs at work.  She has had no falls or blows to her ribs.  Deep breaths do not hurt.  She denies rash.    Gabapentin helping with her back pain.    Still smoking.  One pack lasts 3 days typically, but admits when feeling anxious smokes more.    Also reports that anxiety has been \"through the roof.\"    PHQ-9 Depression Screening  Little interest or pleasure in doing things?     Feeling down, depressed, or hopeless?     Trouble falling or staying asleep, or sleeping too much?     Feeling tired or having little energy?     Poor appetite or overeating?     Feeling bad about yourself - or that you are a failure or have let yourself or your family down?     Trouble concentrating on things, such as reading the newspaper or watching television?     Moving or speaking so slowly that other people could have noticed? Or the opposite - being so fidgety or restless that you have been moving around a lot more than usual?     Thoughts that you would be better off dead, or of hurting yourself in some way?     PHQ-9 Total Score     If you checked off any problems, how difficult have these problems made it for you to do your work, take care of things at home, or get along with other people?           Current Outpatient Medications   Medication Instructions    albuterol sulfate  (90 Base) MCG/ACT inhaler 2 puffs, Inhalation, Every 4 Hours PRN    amLODIPine-benazepril (Lotrel) 10-20 MG per capsule 1 capsule, Oral, Daily    buPROPion SR (Wellbutrin SR) 150 MG 12 hr tablet Take one tab by mouth daily for days " "1-3 (150 mg daily).  Day 4 and after, take one tab by mouth twice daily (150 mg twice daily)    chlorthalidone (HYGROTON) 25 mg, Oral, Daily    dicyclomine (BENTYL) 20 MG tablet Take 1 p.o. 3 times daily as needed abdominal pain or cramping.    famotidine (PEPCID) 20 mg, Oral, 2 Times Daily    gabapentin (NEURONTIN) 300 mg, Oral, 4 Times Daily    ibuprofen (ADVIL,MOTRIN) 800 MG tablet Take 1 tablet by mouth Every 8 (Eight) Hours As Needed.    nicotine (NICODERM CQ) 14 MG/24HR patch 1 patch, Transdermal, Every 24 Hours    nicotine (NICODERM CQ) 7 MG/24HR patch 1 patch, Transdermal, Every 24 Hours, Do not start the 7 mg patch until finishing the entire course of 14 mg nicotine patches.    traZODone (DESYREL) 50 mg, Oral, Nightly       The following portions of the patient's history were reviewed and updated as appropriate: allergies, current medications, past family history, past medical history, past social history, past surgical history, and problem list.    Objective   Vital Signs:   /87 (BP Location: Left arm, Patient Position: Sitting, Cuff Size: Adult)   Pulse 80   Temp 97.4 °F (36.3 °C) (Temporal)   Ht 165.1 cm (65\")   Wt 84.7 kg (186 lb 12.8 oz)   SpO2 98%   BMI 31.09 kg/m²     BP Readings from Last 3 Encounters:   11/30/23 133/87   09/21/23 150/87   08/09/23 136/91     Wt Readings from Last 3 Encounters:   11/30/23 84.7 kg (186 lb 12.8 oz)   09/21/23 87.8 kg (193 lb 9.6 oz)   08/10/23 81.6 kg (180 lb)           Physical Exam  Vitals reviewed.   Constitutional:       General: She is not in acute distress.     Appearance: Normal appearance. She is not ill-appearing, toxic-appearing or diaphoretic.   HENT:      Head: Normocephalic and atraumatic.      Right Ear: External ear normal.      Left Ear: External ear normal.   Eyes:      Conjunctiva/sclera: Conjunctivae normal.   Cardiovascular:      Rate and Rhythm: Normal rate and regular rhythm.      Pulses: Normal pulses.      Heart sounds: Normal heart " sounds. No murmur heard.     No friction rub. No gallop.   Pulmonary:      Effort: Pulmonary effort is normal. No respiratory distress.      Breath sounds: Normal breath sounds. No stridor. No wheezing, rhonchi or rales.   Chest:      Chest wall: No tenderness.   Abdominal:      General: Abdomen is flat.      Palpations: Abdomen is soft. There is no mass.      Tenderness: There is no abdominal tenderness.   Musculoskeletal:      Right lower leg: No edema.      Left lower leg: No edema.      Comments: Right ribs are NTTP in the area where she is experiencing pain.   Skin:     General: Skin is warm and dry.   Neurological:      General: No focal deficit present.      Mental Status: She is alert. Mental status is at baseline.   Psychiatric:         Mood and Affect: Mood is anxious.         Behavior: Behavior normal.         Thought Content: Thought content normal.         Judgment: Judgment normal.        Result Review :   The following data was reviewed by: Thierno Li MD on 11/30/2023:  Common labs          7/25/2023    14:07 7/31/2023    15:09 8/9/2023    12:31   Common Labs   Glucose 101  86  78    BUN 9  10  22    Creatinine 0.76  0.74  0.95    Sodium 137  138  139    Potassium 3.1  3.4  3.7    Chloride 99  104  102    Calcium 9.8  9.4  10.4    Albumin 4.2  4.1  4.8    Total Bilirubin 0.5  0.4  0.7    Alkaline Phosphatase 128  114  121    AST (SGOT) 24  20  28    ALT (SGPT) 66  33  35    WBC  10.46     Hemoglobin  11.9     Hematocrit  36.0     Platelets  354              Lab Results   Component Value Date    SARSANTIGEN Not Detected 09/21/2023    COVID19 Not Detected 09/21/2023    RAPFLUA Negative 09/21/2023    RAPFLUB Negative 09/21/2023    FLUAAG Not Detected 09/21/2023    FLUBAG Not Detected 09/21/2023    RAPSCRN Negative 02/17/2023    INR 1.03 07/18/2023    BILIRUBINUR Negative 07/31/2023       Procedures        Assessment and Plan    Diagnoses and all orders for this visit:    1. Essential hypertension  (Primary)    2. Chronic right-sided low back pain with right-sided sciatica  -     gabapentin (NEURONTIN) 300 MG capsule; Take 1 capsule by mouth 4 (Four) Times a Day.  Dispense: 120 capsule; Refill: 2    3. Medication management  -     POC Urine Drug Screen [facility]    4. Gastroesophageal reflux disease without esophagitis  -     famotidine (PEPCID) 20 MG tablet; Take 1 tablet by mouth 2 (Two) Times a Day.  Dispense: 180 tablet; Refill: 2    5. Colon cancer screening  -     Ambulatory Referral For Screening Colonoscopy    6. Sprain of costal cartilage, initial encounter    7. Cigarette nicotine dependence without complication  -     buPROPion SR (Wellbutrin SR) 150 MG 12 hr tablet; Take one tab by mouth daily for days 1-3 (150 mg daily).  Day 4 and after, take one tab by mouth twice daily (150 mg twice daily)  Dispense: 60 tablet; Refill: 2  -     nicotine (NICODERM CQ) 14 MG/24HR patch; Place 1 patch on the skin as directed by provider Daily.  Dispense: 28 each; Refill: 1  -     nicotine (NICODERM CQ) 7 MG/24HR patch; Place 1 patch on the skin as directed by provider Daily. Do not start the 7 mg patch until finishing the entire course of 14 mg nicotine patches.  Dispense: 14 each; Refill: 0    8. Anxiety  -     Ambulatory Referral to Behavioral Health    9. PTSD (post-traumatic stress disorder)  -     Ambulatory Referral to Behavioral Health      HTN:  -stable  -will continue lotrel and chlorthalidone    Sprain of costal cartilage:  -NSAIDs PRN      Tobacco cessation:  -counseled today on the health risks of tobacco use  -strongly counseled today on the importance of tobacco cessation  -counseling today on medical and non-medical strategies for smoking cessation  -non-medical strategies discussed today include the following: identifying your motivations for smoking cessation, setting a quit date, identifying your usual patterns and triggers for smoking, coming up with strategies ahead of time for managing  your usual smoking times and usual triggers (such as making it as inconvenient as possible to complete the act, coming up with short substitute activities to engage in in lieu of tobacco use)  -will trial bupropion in tandem with nicotine patches    Medications Discontinued During This Encounter   Medication Reason    famotidine (PEPCID) 20 MG tablet Reorder    azithromycin (Zithromax) 250 MG tablet     diazePAM (Valium) 5 MG tablet     ondansetron ODT (ZOFRAN-ODT) 4 MG disintegrating tablet     gabapentin (NEURONTIN) 300 MG capsule Reorder          Follow Up   Return if symptoms worsen or fail to improve.  Patient was given instructions and counseling regarding her condition or for health maintenance advice. Please see specific information pulled into the AVS if appropriate.       Thierno Li MD  11/30/23  19:41 EST

## 2023-12-04 DIAGNOSIS — G47.00 INSOMNIA, UNSPECIFIED TYPE: ICD-10-CM

## 2023-12-05 RX ORDER — TRAZODONE HYDROCHLORIDE 50 MG/1
50 TABLET ORAL NIGHTLY
Qty: 90 TABLET | Refills: 1 | Status: SHIPPED | OUTPATIENT
Start: 2023-12-05

## 2023-12-29 ENCOUNTER — OFFICE VISIT (OUTPATIENT)
Dept: PSYCHIATRY | Facility: CLINIC | Age: 58
End: 2023-12-29
Payer: COMMERCIAL

## 2023-12-29 VITALS
WEIGHT: 189 LBS | SYSTOLIC BLOOD PRESSURE: 123 MMHG | DIASTOLIC BLOOD PRESSURE: 79 MMHG | HEIGHT: 65 IN | HEART RATE: 87 BPM | BODY MASS INDEX: 31.49 KG/M2

## 2023-12-29 DIAGNOSIS — F41.0 PANIC ATTACKS: ICD-10-CM

## 2023-12-29 DIAGNOSIS — F32.A MOOD DISORDER OF DEPRESSED TYPE: ICD-10-CM

## 2023-12-29 DIAGNOSIS — F32.0 CURRENT MILD EPISODE OF MAJOR DEPRESSIVE DISORDER WITHOUT PRIOR EPISODE: Primary | ICD-10-CM

## 2023-12-29 DIAGNOSIS — F43.10 POST TRAUMATIC STRESS DISORDER (PTSD): ICD-10-CM

## 2023-12-29 RX ORDER — CLONIDINE HYDROCHLORIDE 0.1 MG/1
TABLET ORAL
Qty: 60 TABLET | Refills: 1 | Status: SHIPPED | OUTPATIENT
Start: 2023-12-29

## 2023-12-29 RX ORDER — POTASSIUM CHLORIDE 750 MG/1
TABLET, EXTENDED RELEASE ORAL
COMMUNITY
Start: 2023-12-15

## 2023-12-29 NOTE — PROGRESS NOTES
"[facility]  Initial Evaluation    Referring Provider:  No referring provider defined for this encounter.    Chief Complaint: \"I am having the worst anxiety\"     History of Present Illness: Vita Cuevas is a vivacious 58 y.o. female who presents today for initial evaluation regarding anxiety, and panic attacks.  Ms. Cuevas presents unaccompanied in no anxious distress and engages with me appropriately. Psychotropic regimen with which patient presents is described as nothing.     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  She believes that her anxiety, brain fog, and worsening anxiety is related to menopause. Psychiatric screening is negative for pathognomonic history of: TBI, mila, psychosis, violence, and suicidality.   Recently, patient complains of worsening panic that is affecting her work.  She stated that she struggled over at her oldest son's during [place].Recurrent episodes of intense, unprovoked anxiety with chest pain, feelings of doom, dizziness, avoidance 2/2 fear of recurrence of panic.  Patient screened for mila, there is a familial history of bipolar disorder(son), and bipolar disorder can sometime manifest in menopausal women, regalado, patient did not meet DSM-V criteria for bipolar affective disorder.  Her history led me to believe that there is a obsessive-compulsive component that she is dealing with.  She did not meet criteria for obsessive compulsive disorder per the DSM-V.  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 Patient stated multiple times that she does not like to take any medicine, so I was mindful of that, but wanted to educate patient on all options like benzodiazepines, buspirone, and hydroxyzine. After patient education, patient agreed to trial clonidine 0.1 mg po BID prn for panic attacks. R/B/E reviewed. I will begin clonidine for panic attacks; I have advised this agent has propensity to diminish blood pressure and may result in dizziness, sedation, xerostomia. Patient instructed to be mindful of orthostatic hypotension. Encouraged patient to obtain home BP cuff.     I have counseled the patient with regard to diagnoses and the recommended treatment regimen as documented below: I will assume prescriptive responsibility for clonidine 0.1 mg po twice daily prn, anxiety. Patient acknowledges the diagnoses per my rendered interpretation. Patient demonstrates awareness/understanding of viable alternatives for treatment as well as potential risks, benefits, and side effects associated with this regimen and is amenable to proceed in this fashion.     Recommended life additions: participate in meaningful movement such as yoga, or stretching as tolerated. Journal during panic attack via word dump or sketching.     Psychiatric History:  Diagnoses: PTSD, MISSAEL  Outpatient history: in [place] for therapy  Inpatient history: 3 days 2016  Medication trials: bupropion, sertraline, buspirone  Other treatment modalities: none  Presenting regimen: none  Self harm:excoriation, and picking  Suicide attempts: OD thoughts    Substance Abuse History:   Types/methods/frequency: none  Withdrawal history: n/a  Sobriety: n/a  Transtheoretical stage: n/a    Social History:  Residence: lives apartment with animals  Vocation: laura  Education: year of college  Pertinent developmental history: adopted, \"slow\"  Pertinent legal history: bad " checks-expunged  Hobbies/interests: animals, reading, nature  [place]: both   Exercise: no  Dietary habits: no pertinent issues  Sleep hygiene: not ideal, variable  Social habits: no pertinent issues  Sunlight: no concern for under-exposure  Caffeine intake: 24 oz coffee, 3 cans  Hydration habits:needs to drink more   history: yes    Social History     Socioeconomic History    Marital status: Single    Number of children: 3    Years of education: 12+    Highest education level: Some college, no degree   Tobacco Use    Smoking status: Some Days     Packs/day: 0.25     Years: 37.00     Additional pack years: 0.00     Total pack years: 9.25     Types: Cigarettes     Passive exposure: Current    Smokeless tobacco: Never    Tobacco comments:     1-2 CIGS PER DAY   Vaping Use    Vaping Use: Never used   Substance and Sexual Activity    Alcohol use: Not Currently    Drug use: Never    Sexual activity: Not Currently     Partners: Male     Birth control/protection: Abstinence, Tubal ligation, Post-menopausal, None     Access to Firearms: no    Tobacco use counseling/intervention: patient was counseled with regard to risks of tobacco use and encouraged to consider quitting, with or without the use of adjunctive medication, by first setting a prospective quit date. Currently pre-contemplation by transtheoretical model.     PHQ-9 Depression Screening  PHQ-9 Total Score: 23    Little interest or pleasure in doing things? 2-->more than half the days   Feeling down, depressed, or hopeless? 3-->nearly every day   Trouble falling or staying asleep, or sleeping too much? 3-->nearly every day   Feeling tired or having little energy? 3-->nearly every day   Poor appetite or overeating? 3-->nearly every day   Feeling bad about yourself - or that you are a failure or have let yourself or your family down? 3-->nearly every day   Trouble concentrating on things, such as reading the newspaper or watching television? 3-->nearly every  day   Moving or speaking so slowly that other people could have noticed? Or the opposite - being so fidgety or restless that you have been moving around a lot more than usual? 3-->nearly every day   Thoughts that you would be better off dead, or of hurting yourself in some way? 0-->not at all   PHQ-9 Total Score 23     MISSAEL-7  Feeling nervous, anxious or on edge: Nearly every day  Not being able to stop or control worrying: Nearly every day  Worrying too much about different things: Nearly every day  Trouble Relaxing: Nearly every day  Being so restless that it is hard to sit still: Nearly every day  Feeling afraid as if something awful might happen: Nearly every day  Becoming easily annoyed or irritable: Several days  MISSAEL 7 Total Score: 19  If you checked any problems, how difficult have these problems made it for you to do your work, take care of things at home, or get along with other people: Extremely difficult    Problem List:  Patient Active Problem List   Diagnosis    Hypokalemia    Annual physical exam    Calculus of gallbladder    Acute cholecystitis    Choledocholithiasis with acute cholecystitis    S/P laparoscopic cholecystectomy     Allergy:   No Known Allergies     Discontinued Medications:  There are no discontinued medications.    Current Medications:   Current Outpatient Medications   Medication Sig Dispense Refill    albuterol sulfate  (90 Base) MCG/ACT inhaler Inhale 2 puffs Every 4 (Four) Hours As Needed for Wheezing or Shortness of Air. 18 g 2    amLODIPine-benazepril (Lotrel) 10-20 MG per capsule Take 1 capsule by mouth Daily. 90 capsule 2    chlorthalidone (HYGROTON) 25 MG tablet Take 1 tablet by mouth Daily. 90 tablet 2    famotidine (PEPCID) 20 MG tablet Take 1 tablet by mouth 2 (Two) Times a Day. 180 tablet 2    gabapentin (NEURONTIN) 300 MG capsule Take 1 capsule by mouth 4 (Four) Times a Day. 120 capsule 2    potassium chloride (K-DUR,KLOR-CON) 10 MEQ CR tablet       traZODone  (DESYREL) 50 MG tablet TAKE 1 TABLET BY MOUTH EVERY NIGHT 90 tablet 1    buPROPion SR (Wellbutrin SR) 150 MG 12 hr tablet Take one tab by mouth daily for days 1-3 (150 mg daily).  Day 4 and after, take one tab by mouth twice daily (150 mg twice daily) (Patient not taking: Reported on 2023) 60 tablet 2    cloNIDine (Catapres) 0.1 MG tablet One tablet twice daily as needed for anxiety, be slow to rise from bed, or chair 60 tablet 1    dicyclomine (BENTYL) 20 MG tablet Take 1 p.o. 3 times daily as needed abdominal pain or cramping. (Patient not taking: Reported on 2023) 15 tablet 0    ibuprofen (ADVIL,MOTRIN) 800 MG tablet Take 1 tablet by mouth Every 8 (Eight) Hours As Needed. (Patient not taking: Reported on 2023)      nicotine (NICODERM CQ) 14 MG/24HR patch Place 1 patch on the skin as directed by provider Daily. (Patient not taking: Reported on 2023) 28 each 1    nicotine (NICODERM CQ) 7 MG/24HR patch Place 1 patch on the skin as directed by provider Daily. Do not start the 7 mg patch until finishing the entire course of 14 mg nicotine patches. (Patient not taking: Reported on 2023) 14 each 0     No current facility-administered medications for this visit.     Past Medical History:  Past Medical History:   Diagnosis Date    Anxiety     Calculus of gallbladder 2023    Endometriosis     Hypertension     Kidney stone     Other insomnia     PTSD (post-traumatic stress disorder)      Past Surgical History:  Past Surgical History:   Procedure Laterality Date     SECTION      x3    CHOLECYSTECTOMY N/A 2023    Procedure: CHOLECYSTECTOMY LAPAROSCOPIC, INTRAOPERATIVE CHOLANGIOGRAM;  Surgeon: Marbella Razo MD;  Location: [facility] MAIN OR;  Service: General;  Laterality: N/A;    ERCP N/A 2023    Procedure: ENDOSCOPIC RETROGRADE CHOLANGIOPANCREATOGRAPHY WITH BALLOON SWEEP, SPHINCTEROTOMY AND STENT PLACEMENT;  Surgeon: Rush Lee MD;  Location: [facility]  "ENDOSCOPY;  Service: Gastroenterology;  Laterality: N/A;  BILE DUCT STONE    ERCP N/A 08/08/2023    Procedure: ENDOSCOPIC RETROGRADE CHOLANGIOPANCREATOGRAPHY WITH STENT REMOVAL, BALLOON SWEEP AND BRUSHINGS;  Surgeon: Rush Lee MD;  Location: [facility] ENDOSCOPY;  Service: Gastroenterology;  Laterality: N/A;  DISTAL BILE DUCT STRICTURE    KIDNEY STONE SURGERY      OTHER SURGICAL HISTORY      Endometriosis     Family History:   Family History   Adopted: Yes   Family history unknown: Yes     negative for dementia, seizures, and substance dependence unless otherwise noted    Mental Status Exam:   Appearance: well-groomed, sits upright, age-appropriate, normal habitus  Behavior: nervous cooperative, appropriate in demeanor, appropriate eye-contact  Mood/affect: euthymic / mood-congruent and appropriate in both range and amplitude  Speech: within expected variance; appropriate rate, appropriate rhythm, appropriate tone; non-pressured  Thought Process: linear, goal-directed; no FOI or CHANDLER; abstraction intact  Thought Content: coherent, devoid of overt delusions/perceptual disturbances  SI/HI: denies both SI and HI; exhibits future-orientation, self-advocates appropriately, no regular self-harm, no appreciable intent  Memory: no overt deficits  Orientation: oriented to person/place/time/situation  Concentration: appropriate during interview  Intellectual capacity: presumptively average  Insight: fair by given history/exam  Judgment: appropriate by given history/exam  Psychomotor: no appreciable latency/retardation/agitation/tremor  Gait: WNL    Review of Systems:  Review of Systems     Vital Signs:   /79   Pulse 87   Ht 165.1 cm (65\")   Wt 85.7 kg (189 lb)   BMI 31.45 kg/m²    Lab Results:   Office Visit on 11/30/2023   Component Date Value Ref Range Status    Amphetamine Screen, Urine 11/30/2023 Negative  Negative Final    AMP INTERNAL CONTROL 11/30/2023 Passed  Passed Final    Barbiturates Screen, Urine " 11/30/2023 Negative  Negative Final    BARBITURATE INTERNAL CONTROL 11/30/2023 Passed  Passed Final    Buprenorphine, Screen, Urine 11/30/2023 Negative  Negative Final    BUPRENORPHINE INTERNAL CONTROL 11/30/2023 Passed  Passed Final    Benzodiazepine Screen, Urine 11/30/2023 Negative  Negative Final    BENZODIAZEPINE INTERNAL CONTROL 11/30/2023 Passed  Passed Final    Cocaine Screen, Urine 11/30/2023 Negative  Negative Final    COCAINE INTERNAL CONTROL 11/30/2023 Passed  Passed Final    MDMA (ECSTASY) 11/30/2023 Negative  Negative Final    MDMA (ECSTASY) INTERNAL CONTROL 11/30/2023 Passed  Passed Final    Methamphetamine, Ur 11/30/2023 Negative  Negative Final    METHAMPHETAMINE INTERNAL CONTROL 11/30/2023 Passed  Passed Final    Methadone Screen, Urine 11/30/2023 Negative  Negative Final    METHADONE INTERNAL CONTROL 11/30/2023 Passed  Passed Final    Opiate Screen 11/30/2023 Negative  Negative Final    OPIATES INTERNAL CONTROL 11/30/2023 Passed  Passed Final    Oxycodone Screen, Urine 11/30/2023 Negative  Negative Final    OXYCODONE INTERNAL CONTROL 11/30/2023 Passed  Passed Final    Phencyclidine (PCP), Urine 11/30/2023 Negative  Negative Final    PHENCYCLIDINE INTERNAL CONTROL 11/30/2023 Passed  Passed Final    THC, Screen, Urine 11/30/2023 Negative  Negative Final    THC INTERNAL CONTROL 11/30/2023 Passed  Passed Final    Lot Number 11/30/2023 -   Final    Expiration Date 11/30/2023 -   Final   Office Visit on 09/21/2023   Component Date Value Ref Range Status    Rapid Influenza A Ag 09/21/2023 Negative  Negative Final    Rapid Influenza B Ag 09/21/2023 Negative  Negative Final    Internal Control 09/21/2023 Passed  Passed Final    Lot Number 09/21/2023 -   Final    Expiration Date 09/21/2023 -   Final    SARS Antigen 09/21/2023 Not Detected  Not Detected, Presumptive Negative Final    Influenza A Antigen CHASE 09/21/2023 Not Detected  Not Detected Final    Influenza B Antigen CHASE 09/21/2023 Not Detected  Not  Detected Final    Internal Control 09/21/2023 Passed  Passed Final    Lot Number 09/21/2023 -   Final    Expiration Date 09/21/2023 -   Final    COVID19 09/21/2023 Not Detected  Not Detected - Ref. Range Final   Office Visit on 08/09/2023   Component Date Value Ref Range Status    Glucose 08/09/2023 78  65 - 99 mg/dL Final    BUN 08/09/2023 22 (H)  6 - 20 mg/dL Final    Creatinine 08/09/2023 0.95  0.57 - 1.00 mg/dL Final    Sodium 08/09/2023 139  136 - 145 mmol/L Final    Potassium 08/09/2023 3.7  3.5 - 5.2 mmol/L Final    Chloride 08/09/2023 102  98 - 107 mmol/L Final    CO2 08/09/2023 23.0  22.0 - 29.0 mmol/L Final    Calcium 08/09/2023 10.4  8.6 - 10.5 mg/dL Final    Total Protein 08/09/2023 7.9  6.0 - 8.5 g/dL Final    Albumin 08/09/2023 4.8  3.5 - 5.2 g/dL Final    ALT (SGPT) 08/09/2023 35 (H)  1 - 33 U/L Final    AST (SGOT) 08/09/2023 28  1 - 32 U/L Final    Alkaline Phosphatase 08/09/2023 121 (H)  39 - 117 U/L Final    Total Bilirubin 08/09/2023 0.7  0.0 - 1.2 mg/dL Final    Globulin 08/09/2023 3.1  gm/dL Final    A/G Ratio 08/09/2023 1.5  g/dL Final    BUN/Creatinine Ratio 08/09/2023 23.2  7.0 - 25.0 Final    Anion Gap 08/09/2023 14.0  5.0 - 15.0 mmol/L Final    eGFR 08/09/2023 70.5  >60.0 mL/min/1.73 Final    Magnesium 08/09/2023 2.1  1.6 - 2.6 mg/dL Final   Admission on 08/08/2023, Discharged on 08/08/2023   Component Date Value Ref Range Status    Case Report 08/08/2023    Final                    Value:Medical Cytology Report                           Case: [id number]                                  Authorizing Provider:  Rush Lee MD    Collected:           08/08/2023 08:59 AM          [facility] Received:            08/09/2023 09:18 AM                                 [facility]                                                                       Pathologist:           Rj Man MD                                                            Specimen:     Common Bile Duct, BILE DUCT BRUSHINGS                                                      Final Diagnosis 08/08/2023    Final                    Value:This result contains rich text formatting which cannot be displayed here.    Clinical Information 08/08/2023    Final                    Value:This result contains rich text formatting which cannot be displayed here.    Gross Description 08/08/2023    Final                    Value:This result contains rich text formatting which cannot be displayed here.    Microscopic Description 08/08/2023    Final                    Value:This result contains rich text formatting which cannot be displayed here.   Admission on 07/31/2023, Discharged on 07/31/2023   Component Date Value Ref Range Status    Glucose 07/31/2023 86  65 - 99 mg/dL Final    BUN 07/31/2023 10  6 - 20 mg/dL Final    Creatinine 07/31/2023 0.74  0.57 - 1.00 mg/dL Final    Sodium 07/31/2023 138  136 - 145 mmol/L Final    Potassium 07/31/2023 3.4 (L)  3.5 - 5.2 mmol/L Final    Chloride 07/31/2023 104  98 - 107 mmol/L Final    CO2 07/31/2023 24.4  22.0 - 29.0 mmol/L Final    Calcium 07/31/2023 9.4  8.6 - 10.5 mg/dL Final    Total Protein 07/31/2023 7.1  6.0 - 8.5 g/dL Final    Albumin 07/31/2023 4.1  3.5 - 5.2 g/dL Final    ALT (SGPT) 07/31/2023 33  1 - 33 U/L Final    AST (SGOT) 07/31/2023 20  1 - 32 U/L Final    Alkaline Phosphatase 07/31/2023 114  39 - 117 U/L Final    Total Bilirubin 07/31/2023 0.4  0.0 - 1.2 mg/dL Final    Globulin 07/31/2023 3.0  gm/dL Final    A/G Ratio 07/31/2023 1.4  g/dL Final    BUN/Creatinine Ratio 07/31/2023 13.5  7.0 - 25.0 Final    Anion Gap 07/31/2023 9.6  5.0 - 15.0 mmol/L Final    eGFR 07/31/2023 95.1  >60.0 mL/min/1.73 Final    Lipase 07/31/2023 93 (H)  13 - 60 U/L Final    Color, UA 07/31/2023 Yellow  Yellow, Straw Final    Appearance,  07/31/2023 Clear  Clear Final    pH,  07/31/2023 7.0  5.0 - 8.0 Final    Specific Gravity,  07/31/2023 1.011  1.005 - 1.030 Final     Glucose, UA 07/31/2023 Negative  Negative Final    Ketones, UA 07/31/2023 Negative  Negative Final    Bilirubin, UA 07/31/2023 Negative  Negative Final    Blood, UA 07/31/2023 Negative  Negative Final    Protein, UA 07/31/2023 Negative  Negative Final    Leuk Esterase, UA 07/31/2023 Trace (A)  Negative Final    Nitrite, UA 07/31/2023 Negative  Negative Final    Urobilinogen, UA 07/31/2023 0.2 E.U./dL  0.2 - 1.0 E.U./dL Final    Lactate 07/31/2023 1.1  0.5 - 2.0 mmol/L Final    Extra Tube 07/31/2023 Hold for add-ons.   Final    Auto resulted.    Extra Tube 07/31/2023 hold for add-on   Final    Auto resulted    Extra Tube 07/31/2023 Hold for add-ons.   Final    Auto resulted.    Extra Tube 07/31/2023 Hold for add-ons.   Final    Auto resulted    WBC 07/31/2023 10.46  3.40 - 10.80 10*3/mm3 Final    RBC 07/31/2023 3.93  3.77 - 5.28 10*6/mm3 Final    Hemoglobin 07/31/2023 11.9 (L)  12.0 - 15.9 g/dL Final    Hematocrit 07/31/2023 36.0  34.0 - 46.6 % Final    MCV 07/31/2023 91.6  79.0 - 97.0 fL Final    MCH 07/31/2023 30.3  26.6 - 33.0 pg Final    MCHC 07/31/2023 33.1  31.5 - 35.7 g/dL Final    RDW 07/31/2023 13.2  12.3 - 15.4 % Final    RDW-SD 07/31/2023 43.4  37.0 - 54.0 fl Final    MPV 07/31/2023 10.2  6.0 - 12.0 fL Final    Platelets 07/31/2023 354  140 - 450 10*3/mm3 Final    Neutrophil % 07/31/2023 54.6  42.7 - 76.0 % Final    Lymphocyte % 07/31/2023 34.3  19.6 - 45.3 % Final    Monocyte % 07/31/2023 5.4  5.0 - 12.0 % Final    Eosinophil % 07/31/2023 4.3  0.3 - 6.2 % Final    Basophil % 07/31/2023 1.1  0.0 - 1.5 % Final    Immature Grans % 07/31/2023 0.3  0.0 - 0.5 % Final    Neutrophils, Absolute 07/31/2023 5.72  1.70 - 7.00 10*3/mm3 Final    Lymphocytes, Absolute 07/31/2023 3.59 (H)  0.70 - 3.10 10*3/mm3 Final    Monocytes, Absolute 07/31/2023 0.56  0.10 - 0.90 10*3/mm3 Final    Eosinophils, Absolute 07/31/2023 0.45 (H)  0.00 - 0.40 10*3/mm3 Final    Basophils, Absolute 07/31/2023 0.11  0.00 - 0.20 10*3/mm3 Final     Immature Grans, Absolute 07/31/2023 0.03  0.00 - 0.05 10*3/mm3 Final    nRBC 07/31/2023 0.0  0.0 - 0.2 /100 WBC Final    RBC, UA 07/31/2023 3-5 (A)  None Seen /HPF Final    WBC, UA 07/31/2023 3-5 (A)  None Seen /HPF Final    Bacteria, UA 07/31/2023 None Seen  None Seen /HPF Final    Squamous Epithelial Cells, UA 07/31/2023 3-6 (A)  None Seen, 0-2 /HPF Final    Hyaline Casts, UA 07/31/2023 0-2  None Seen /LPF Final    Methodology 07/31/2023 Automated Microscopy   Final   Lab on 07/25/2023   Component Date Value Ref Range Status    Glucose 07/25/2023 101 (H)  65 - 99 mg/dL Final    BUN 07/25/2023 9  6 - 20 mg/dL Final    Creatinine 07/25/2023 0.76  0.57 - 1.00 mg/dL Final    Sodium 07/25/2023 137  136 - 145 mmol/L Final    Potassium 07/25/2023 3.1 (L)  3.5 - 5.2 mmol/L Final    Chloride 07/25/2023 99  98 - 107 mmol/L Final    CO2 07/25/2023 23.9  22.0 - 29.0 mmol/L Final    Calcium 07/25/2023 9.8  8.6 - 10.5 mg/dL Final    Total Protein 07/25/2023 7.7  6.0 - 8.5 g/dL Final    Albumin 07/25/2023 4.2  3.5 - 5.2 g/dL Final    ALT (SGPT) 07/25/2023 66 (H)  1 - 33 U/L Final    AST (SGOT) 07/25/2023 24  1 - 32 U/L Final    Alkaline Phosphatase 07/25/2023 128 (H)  39 - 117 U/L Final    Total Bilirubin 07/25/2023 0.5  0.0 - 1.2 mg/dL Final    Globulin 07/25/2023 3.5  gm/dL Final    A/G Ratio 07/25/2023 1.2  g/dL Final    BUN/Creatinine Ratio 07/25/2023 11.8  7.0 - 25.0 Final    Anion Gap 07/25/2023 14.1  5.0 - 15.0 mmol/L Final    eGFR 07/25/2023 92.1  >60.0 mL/min/1.73 Final   Admission on 07/17/2023, Discharged on 07/19/2023   Component Date Value Ref Range Status    Glucose 07/17/2023 120 (H)  65 - 99 mg/dL Final    BUN 07/17/2023 14  6 - 20 mg/dL Final    Creatinine 07/17/2023 0.80  0.57 - 1.00 mg/dL Final    Sodium 07/17/2023 143  136 - 145 mmol/L Final    Potassium 07/17/2023 3.5  3.5 - 5.2 mmol/L Final    Chloride 07/17/2023 107  98 - 107 mmol/L Final    CO2 07/17/2023 24.6  22.0 - 29.0 mmol/L Final    Calcium  07/17/2023 9.8  8.6 - 10.5 mg/dL Final    Total Protein 07/17/2023 7.2  6.0 - 8.5 g/dL Final    Albumin 07/17/2023 4.4  3.5 - 5.2 g/dL Final    ALT (SGPT) 07/17/2023 16  1 - 33 U/L Final    AST (SGOT) 07/17/2023 18  1 - 32 U/L Final    Alkaline Phosphatase 07/17/2023 87  39 - 117 U/L Final    Total Bilirubin 07/17/2023 0.3  0.0 - 1.2 mg/dL Final    Globulin 07/17/2023 2.8  gm/dL Final    A/G Ratio 07/17/2023 1.6  g/dL Final    BUN/Creatinine Ratio 07/17/2023 17.5  7.0 - 25.0 Final    Anion Gap 07/17/2023 11.4  5.0 - 15.0 mmol/L Final    eGFR 07/17/2023 86.6  >60.0 mL/min/1.73 Final    Lipase 07/17/2023 27  13 - 60 U/L Final    Color, UA 07/17/2023 Yellow  Yellow, Straw Final    Appearance, UA 07/17/2023 Clear  Clear Final    pH, UA 07/17/2023 5.5  5.0 - 8.0 Final    Specific Gravity, UA 07/17/2023 1.016  1.005 - 1.030 Final    Glucose, UA 07/17/2023 Negative  Negative Final    Ketones, UA 07/17/2023 Negative  Negative Final    Bilirubin, UA 07/17/2023 Negative  Negative Final    Blood, UA 07/17/2023 Negative  Negative Final    Protein, UA 07/17/2023 Negative  Negative Final    Leuk Esterase, UA 07/17/2023 Negative  Negative Final    Nitrite, UA 07/17/2023 Negative  Negative Final    Urobilinogen, UA 07/17/2023 1.0 E.U./dL  0.2 - 1.0 E.U./dL Final    Lactate 07/17/2023 1.8  0.5 - 2.0 mmol/L Final    Extra Tube 07/17/2023 Hold for add-ons.   Final    Auto resulted.    Extra Tube 07/17/2023 hold for add-on   Final    Auto resulted    Extra Tube 07/17/2023 Hold for add-ons.   Final    Auto resulted.    Extra Tube 07/17/2023 Hold for add-ons.   Final    Auto resulted    WBC 07/17/2023 8.49  3.40 - 10.80 10*3/mm3 Final    RBC 07/17/2023 4.58  3.77 - 5.28 10*6/mm3 Final    Hemoglobin 07/17/2023 13.9  12.0 - 15.9 g/dL Final    Hematocrit 07/17/2023 40.7  34.0 - 46.6 % Final    MCV 07/17/2023 88.9  79.0 - 97.0 fL Final    MCH 07/17/2023 30.3  26.6 - 33.0 pg Final    MCHC 07/17/2023 34.2  31.5 - 35.7 g/dL Final    RDW  07/17/2023 12.8  12.3 - 15.4 % Final    RDW-SD 07/17/2023 41.7  37.0 - 54.0 fl Final    MPV 07/17/2023 11.4  6.0 - 12.0 fL Final    Platelets 07/17/2023 224  140 - 450 10*3/mm3 Final    Neutrophil % 07/17/2023 30.4 (L)  42.7 - 76.0 % Final    Lymphocyte % 07/17/2023 58.8 (H)  19.6 - 45.3 % Final    Monocyte % 07/17/2023 6.6  5.0 - 12.0 % Final    Eosinophil % 07/17/2023 3.2  0.3 - 6.2 % Final    Basophil % 07/17/2023 0.8  0.0 - 1.5 % Final    Immature Grans % 07/17/2023 0.2  0.0 - 0.5 % Final    Neutrophils, Absolute 07/17/2023 2.58  1.70 - 7.00 10*3/mm3 Final    Lymphocytes, Absolute 07/17/2023 4.99 (H)  0.70 - 3.10 10*3/mm3 Final    Monocytes, Absolute 07/17/2023 0.56  0.10 - 0.90 10*3/mm3 Final    Eosinophils, Absolute 07/17/2023 0.27  0.00 - 0.40 10*3/mm3 Final    Basophils, Absolute 07/17/2023 0.07  0.00 - 0.20 10*3/mm3 Final    Immature Grans, Absolute 07/17/2023 0.02  0.00 - 0.05 10*3/mm3 Final    nRBC 07/17/2023 0.0  0.0 - 0.2 /100 WBC Final    Blood Culture 07/17/2023 No growth at 5 days   Final    Blood Culture 07/17/2023 No growth at 5 days   Final    Case Report 07/17/2023    Final                    Value:Surgical Pathology Report                         Case: [id number]                                  Authorizing Provider:  Marbella Razo MD    Collected:           07/17/2023 05:51 PM          Ordering Location:     [facility] Received:            07/18/2023 08:24 AM                                 OR                                                                           Pathologist:           Vianca Morfin DO                                                       Specimen:    Gallbladder, gallbladder and contents                                                      Clinical Information 07/17/2023    Final                    Value:This result contains rich text formatting which cannot be displayed here.    Final Diagnosis 07/17/2023    Final                    Value:This  result contains rich text formatting which cannot be displayed here.    Gross Description 07/17/2023    Final                    Value:This result contains rich text formatting which cannot be displayed here.    Microscopic Description 07/17/2023    Final                    Value:This result contains rich text formatting which cannot be displayed here.    WBC 07/18/2023 8.33  3.40 - 10.80 10*3/mm3 Final    RBC 07/18/2023 4.30  3.77 - 5.28 10*6/mm3 Final    Hemoglobin 07/18/2023 13.2  12.0 - 15.9 g/dL Final    Hematocrit 07/18/2023 39.4  34.0 - 46.6 % Final    MCV 07/18/2023 91.6  79.0 - 97.0 fL Final    MCH 07/18/2023 30.7  26.6 - 33.0 pg Final    MCHC 07/18/2023 33.5  31.5 - 35.7 g/dL Final    RDW 07/18/2023 12.9  12.3 - 15.4 % Final    RDW-SD 07/18/2023 43.0  37.0 - 54.0 fl Final    MPV 07/18/2023 11.4  6.0 - 12.0 fL Final    Platelets 07/18/2023 215  140 - 450 10*3/mm3 Final    Neutrophil % 07/18/2023 88.7 (H)  42.7 - 76.0 % Final    Lymphocyte % 07/18/2023 8.6 (L)  19.6 - 45.3 % Final    Monocyte % 07/18/2023 2.3 (L)  5.0 - 12.0 % Final    Eosinophil % 07/18/2023 0.0 (L)  0.3 - 6.2 % Final    Basophil % 07/18/2023 0.2  0.0 - 1.5 % Final    Immature Grans % 07/18/2023 0.2  0.0 - 0.5 % Final    Neutrophils, Absolute 07/18/2023 7.38 (H)  1.70 - 7.00 10*3/mm3 Final    Lymphocytes, Absolute 07/18/2023 0.72  0.70 - 3.10 10*3/mm3 Final    Monocytes, Absolute 07/18/2023 0.19  0.10 - 0.90 10*3/mm3 Final    Eosinophils, Absolute 07/18/2023 0.00  0.00 - 0.40 10*3/mm3 Final    Basophils, Absolute 07/18/2023 0.02  0.00 - 0.20 10*3/mm3 Final    Immature Grans, Absolute 07/18/2023 0.02  0.00 - 0.05 10*3/mm3 Final    nRBC 07/18/2023 0.0  0.0 - 0.2 /100 WBC Final    Glucose 07/18/2023 126 (H)  65 - 99 mg/dL Final    BUN 07/18/2023 13  6 - 20 mg/dL Final    Creatinine 07/18/2023 0.89  0.57 - 1.00 mg/dL Final    Sodium 07/18/2023 140  136 - 145 mmol/L Final    Potassium 07/18/2023 3.7  3.5 - 5.2 mmol/L Final    Chloride 07/18/2023  105  98 - 107 mmol/L Final    CO2 07/18/2023 24.5  22.0 - 29.0 mmol/L Final    Calcium 07/18/2023 9.2  8.6 - 10.5 mg/dL Final    Total Protein 07/18/2023 6.8  6.0 - 8.5 g/dL Final    Albumin 07/18/2023 4.0  3.5 - 5.2 g/dL Final    ALT (SGPT) 07/18/2023 526 (H)  1 - 33 U/L Final    AST (SGOT) 07/18/2023 505 (H)  1 - 32 U/L Final    Alkaline Phosphatase 07/18/2023 133 (H)  39 - 117 U/L Final    Total Bilirubin 07/18/2023 0.9  0.0 - 1.2 mg/dL Final    Globulin 07/18/2023 2.8  gm/dL Final    A/G Ratio 07/18/2023 1.4  g/dL Final    BUN/Creatinine Ratio 07/18/2023 14.6  7.0 - 25.0 Final    Anion Gap 07/18/2023 10.5  5.0 - 15.0 mmol/L Final    eGFR 07/18/2023 76.2  >60.0 mL/min/1.73 Final    Magnesium 07/18/2023 1.7  1.6 - 2.6 mg/dL Final    Protime 07/18/2023 13.6  11.8 - 14.9 Seconds Final    INR 07/18/2023 1.03  0.86 - 1.15 Final    Glucose 07/19/2023 91  65 - 99 mg/dL Final    BUN 07/19/2023 15  6 - 20 mg/dL Final    Creatinine 07/19/2023 0.70  0.57 - 1.00 mg/dL Final    Sodium 07/19/2023 142  136 - 145 mmol/L Final    Potassium 07/19/2023 2.9 (L)  3.5 - 5.2 mmol/L Final    Chloride 07/19/2023 108 (H)  98 - 107 mmol/L Final    CO2 07/19/2023 25.8  22.0 - 29.0 mmol/L Final    Calcium 07/19/2023 8.3 (L)  8.6 - 10.5 mg/dL Final    Total Protein 07/19/2023 5.5 (L)  6.0 - 8.5 g/dL Final    Albumin 07/19/2023 3.2 (L)  3.5 - 5.2 g/dL Final    ALT (SGPT) 07/19/2023 270 (H)  1 - 33 U/L Final    AST (SGOT) 07/19/2023 128 (H)  1 - 32 U/L Final    Alkaline Phosphatase 07/19/2023 95  39 - 117 U/L Final    Total Bilirubin 07/19/2023 0.5  0.0 - 1.2 mg/dL Final    Globulin 07/19/2023 2.3  gm/dL Final    A/G Ratio 07/19/2023 1.4  g/dL Final    BUN/Creatinine Ratio 07/19/2023 21.4  7.0 - 25.0 Final    Anion Gap 07/19/2023 8.2  5.0 - 15.0 mmol/L Final    eGFR 07/19/2023 101.6  >60.0 mL/min/1.73 Final     EKG Results:  No orders to display     Imaging Results:  No Images in the past 120 days found..  ASSESSMENT AND PLAN:    ICD-10-CM  ICD-9-CM   1. Current mild episode of major depressive disorder without prior episode  F32.0 296.21   2. Panic attacks  F41.0 300.01   3. Post traumatic stress disorder (PTSD)  F43.10 309.81       58 y.o. female who presents today for initial evaluation regarding panic attacks. We have discussed the history and interpreted diagnoses as above as well as the treatment plan below, including potential R/B/SE of the recommended regimen of which the patient demonstrates understanding. Patient is agreeable to call 911 or go to the nearest ER should she become concerned for her own safety and/or the safety of those around her. There are no overt indices of acute mila/psychosis on evaluation today.     Medication regimen: clonidine 0.1 mg po BID prn; patient is advised not to misuse prescribed medications or to use any exogenous substances that aren't disclosed to this provider as they may interact with the regimen to her detriment.   Risk Assessment: protracted risk is moderate, imminent risk is moderate.  Risk factors include: anxiety disorder, mood disorder, and recent/ongoing psychosocial stressors. Protective factors include: no known family history of suicidality, intact reality testing, no substance use disorder, no access to firearms, no present SI, no stated history of suicide attempts or self-harm, patient's exhibited future-orientation, strong social support, and patient's cooperation with care. Do note that this is subject to change with the Judaism of new stressors, treatment non-adherence, use of substances, and/or new medical ails.  Monitoring: reviewed labs/imaging as populated above; PHQ-9 today is 23/27, MISSAEL-7 today is 17/21  Therapy: referral to [facility] [phone]  Follow-up: 4 weeks, 1/29/2024  Communications: N/A    TREATMENT PLAN/GOALS: challenge patterns of living conducive to symptom burden, implement recommended regimen as above with augmentative, intermittent supportive psychotherapy  to reduce symptom burden. Patient acknowledged and verbally consented to begin treatment as above. The importance of adherence to the recommended treatment and interval follow-up appointments was emphasized today. Patient was today advised to limit daily caffeine intake, hydrate appropriately, eat healthy and nutritious foods, engage sleep hygiene measures, engage appropriate exposure to sunlight, engage with hobbies in balance with life necessities, and exercise appropriate to their capacity to do so.     Billing: I have seen the patient today and considered her psychiatric complaints, rendered a diagnosis, and discussed treatment with the patient as above with which she consents.    Parts of this note are electronic transcriptions/translations of spoken language to printed text using the Dragon Dictation system.    Electronically signed by TIFFANY Serrano, 12/29/23,

## 2023-12-29 NOTE — PROGRESS NOTES
Review of Systems   Constitutional:  Positive for unexpected weight change. Negative for activity change and appetite change.   HENT:  Negative for drooling.    Eyes:  Positive for visual disturbance.   Respiratory:  Positive for chest tightness and shortness of breath.    Cardiovascular:  Negative for chest pain and palpitations.   Gastrointestinal:  Positive for abdominal pain. Negative for diarrhea, nausea and vomiting.   Endocrine: Negative for cold intolerance and heat intolerance.   Genitourinary:  Negative for difficulty urinating and frequency.   Musculoskeletal:  Positive for myalgias and neck stiffness.   Skin:  Negative for rash.   Neurological:  Positive for dizziness and light-headedness. Negative for tremors, seizures and headaches.

## 2023-12-29 NOTE — TREATMENT PLAN
Multi-Disciplinary Problems (from Behavioral Health Treatment Plan)      Active Problems       Problem: Anxiety  Start Date: 12/29/23      Problem Details: The patient self-scales this problem as a 4 with 10 being the worst.          Goal Priority Start Date Expected End Date End Date    Patient will develop and implement behavioral and cognitive strategies to reduce anxiety and irrational fears. -- 12/29/23 -- --    Goal Details: Progress toward goal:  The patient self-scales their progress related to this goal as a 3 with 10 being the worst.          Goal Intervention Frequency Start Date End Date    Help patient explore past emotional issues in relation to present anxiety. Weekly 12/29/23 --    Intervention Details: Duration of treatment until until remission of symptoms.          Goal Intervention Frequency Start Date End Date    Help patient develop an awareness of their cognitive and physical responses to anxiety. Weekly 12/29/23 --    Intervention Details: Duration of treatment until until remission of symptoms.                          Reviewed By       Livia Gallego APRN 12/29/23 [id number]                     I have discussed and reviewed this treatment plan with the patient.  It has been printed for signatures.

## 2024-02-09 ENCOUNTER — TELEPHONE (OUTPATIENT)
Dept: GASTROENTEROLOGY | Facility: CLINIC | Age: 59
End: 2024-02-09
Payer: COMMERCIAL

## 2024-02-09 NOTE — TELEPHONE ENCOUNTER
Sent pt a Certified and regular letter. Was unable to reach pt to schedule MRI.     Certified Number: [id number]

## 2024-02-20 ENCOUNTER — OFFICE VISIT (OUTPATIENT)
Dept: PSYCHIATRY | Facility: CLINIC | Age: 59
End: 2024-02-20
Payer: COMMERCIAL

## 2024-02-20 VITALS
HEIGHT: 64 IN | SYSTOLIC BLOOD PRESSURE: 164 MMHG | WEIGHT: 193.4 LBS | HEART RATE: 85 BPM | BODY MASS INDEX: 33.02 KG/M2 | DIASTOLIC BLOOD PRESSURE: 61 MMHG

## 2024-02-20 DIAGNOSIS — F41.0 PANIC ATTACKS: Primary | ICD-10-CM

## 2024-02-20 DIAGNOSIS — G89.29 CHRONIC RIGHT-SIDED LOW BACK PAIN WITH RIGHT-SIDED SCIATICA: ICD-10-CM

## 2024-02-20 DIAGNOSIS — F32.0 CURRENT MILD EPISODE OF MAJOR DEPRESSIVE DISORDER WITHOUT PRIOR EPISODE: ICD-10-CM

## 2024-02-20 DIAGNOSIS — F43.10 POST TRAUMATIC STRESS DISORDER (PTSD): ICD-10-CM

## 2024-02-20 DIAGNOSIS — F32.A MOOD DISORDER OF DEPRESSED TYPE: ICD-10-CM

## 2024-02-20 DIAGNOSIS — M54.41 CHRONIC RIGHT-SIDED LOW BACK PAIN WITH RIGHT-SIDED SCIATICA: ICD-10-CM

## 2024-02-20 RX ORDER — VENLAFAXINE HYDROCHLORIDE 37.5 MG/1
37.5 CAPSULE, EXTENDED RELEASE ORAL DAILY
Qty: 30 CAPSULE | Refills: 2 | Status: SHIPPED | OUTPATIENT
Start: 2024-02-20 | End: 2024-05-20

## 2024-02-20 NOTE — TELEPHONE ENCOUNTER
Refill request for controlled substance.      Date of request: 2/20/2024    Medication requested: Gabapentin  Last OV: 11/30/23  Last UDS: 11/30/23  Contract signed: yes    Date:6/16/23  Next office visit: not scheduled     Gloria Block

## 2024-02-20 NOTE — PROGRESS NOTES
"    Chief Complaint:  \"I am having the worst anxiety\"      History of Present Illness: Vita Cuevas is a vivacious 58 y.o. female who presents today for initial evaluation regarding anxiety, and panic attacks.  Ms. Cuevas presents unaccompanied in no anxious distress and engages with me appropriately. Psychotropic regimen with which patient presents is described as nothing.      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  She believes that her anxiety, brain fog, and worsening anxiety is related to menopause. Psychiatric screening is negative for pathognomonic history of: TBI, mila, psychosis, violence, and suicidality.   Recently, patient complains of worsening panic that is affecting her work.  She stated that she struggled over at her oldest son's during Pop.Recurrent episodes of intense, unprovoked anxiety with chest pain, feelings of doom, dizziness, avoidance 2/2 fear of recurrence of panic.  Patient screened for mila, there is a familial history of bipolar disorder(son), and bipolar disorder can sometime manifest in menopausal women, regalado, patient did not meet DSM-V criteria for bipolar affective disorder.  Her history led me to believe that there is a obsessive-compulsive component that she is dealing with.  She did not meet criteria for obsessive compulsive disorder per the DSM-V.  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 Patient stated multiple times that she does not like " "to take any medicine, so I was mindful of that, but wanted to educate patient on all options like benzodiazepines, buspirone, and hydroxyzine. After patient education, patient agreed to trial clonidine 0.1 mg po BID prn for panic attacks. R/B/E reviewed. I will begin clonidine for panic attacks; I have advised this agent has propensity to diminish blood pressure and may result in dizziness, sedation, xerostomia. Patient instructed to be mindful of orthostatic hypotension. Encouraged patient to obtain home BP cuff.      I have counseled the patient with regard to diagnoses and the recommended treatment regimen as documented below: I will assume prescriptive responsibility for clonidine 0.1 mg po twice daily prn, anxiety. Patient acknowledges the diagnoses per my rendered interpretation. Patient demonstrates awareness/understanding of viable alternatives for treatment as well as potential risks, benefits, and side effects associated with this regimen and is amenable to proceed in this fashion.      Today: \"No energy and low mood. No medications work for me.\" Patient keen on finding a medication that can remove her symptoms of anxiety, and give her energy. Patient has been on a lot of pharmaceutical agents throughout the past years and has grown to distrust their efficacy. Patient complains of vasomotor symptoms of menopause such as flushing and hot flashes. I will be prescribing venlafaxine XR 37.5 mg daily by mouth for anxiety, mood, and vasomotor symptoms.  Patient has been advised that SRIs may take up to 4-8 weeks for full effect and have a possibility of exacerbating mood/anxiety issues for which they are prescribed to the degree that, in some cases, their use may lead to acute suicidality. Patient contracts for safety appropriately. More common SE - sexual dysfunction, GI upset, tremors - were also reviewed. Patient was advised of potential for development of serotonin syndrome (as well as warning signs for " onset) with concomitant use of multiple serotonergic agents and to be sure their health providers are aware this medication is being taken to mitigate this risk. Patient was advised that there is evidence to suggest use of SRIs in pregnancy have been associated with persistent pulmonary hypertension in newborns. Patient signed CSA, and UDS ordered for possible benzodiazepine order TBD. We discussed FDA warning in regard to benzodiazepine use with opioid use and the risk decreased respiratory drive.  Patient has been advised that long-term use of this agent can foster tachyphylaxis, dependence, and severe/life-threatening withdrawal with abrupt discontinuation - this agent will be used sparingly and as a rescue during periods of severe anxiety to augment more regular treatment options. Patient has been advised that this agent can contribute to falls, confusion, sedation, and stunted psychological convalescence. Patient has been made aware of the significant diminishment to respiratory drive when this agent is used in combination with opioids.  I've advised to refrain from using this medication prior to driving or operating machinery. I have specifically reviewed issues related to misuse and diversion with the patient today.       Medical Record Review: yes      Problem List:  Patient Active Problem List   Diagnosis    Hypokalemia    Annual physical exam    Calculus of gallbladder    Acute cholecystitis    Choledocholithiasis with acute cholecystitis    S/P laparoscopic cholecystectomy       Current Medications:   Current Outpatient Medications   Medication Sig Dispense Refill    albuterol sulfate  (90 Base) MCG/ACT inhaler Inhale 2 puffs Every 4 (Four) Hours As Needed for Wheezing or Shortness of Air. 18 g 2    amLODIPine-benazepril (Lotrel) 10-20 MG per capsule Take 1 capsule by mouth Daily. 90 capsule 2    chlorthalidone (HYGROTON) 25 MG tablet Take 1 tablet by mouth Daily. 90 tablet 2    cloNIDine  (Catapres) 0.1 MG tablet One tablet twice daily as needed for anxiety, be slow to rise from bed, or chair 60 tablet 1    gabapentin (NEURONTIN) 300 MG capsule Take 1 capsule by mouth 4 (Four) Times a Day. 120 capsule 2    traZODone (DESYREL) 50 MG tablet TAKE 1 TABLET BY MOUTH EVERY NIGHT 90 tablet 1    venlafaxine XR (Effexor XR) 37.5 MG 24 hr capsule Take 1 capsule by mouth Daily for 90 days. 30 capsule 2     No current facility-administered medications for this visit.       Discontinued Medications:  Medications Discontinued During This Encounter   Medication Reason    ibuprofen (ADVIL,MOTRIN) 800 MG tablet Historical Med - Therapy completed    dicyclomine (BENTYL) 20 MG tablet Patient Reported Not Taking    famotidine (PEPCID) 20 MG tablet Patient Reported Not Taking    nicotine (NICODERM CQ) 14 MG/24HR patch Historical Med - Therapy completed    nicotine (NICODERM CQ) 7 MG/24HR patch *Therapy completed    potassium chloride (K-DUR,KLOR-CON) 10 MEQ CR tablet *Therapy completed       Allergy:   No Known Allergies     Past Medical History:  Past Medical History:   Diagnosis Date    Anxiety     Calculus of gallbladder 2023    Endometriosis     Hypertension     Kidney stone     Other insomnia     PTSD (post-traumatic stress disorder)        Past Surgical History:  Past Surgical History:   Procedure Laterality Date     SECTION      x3    CHOLECYSTECTOMY N/A 2023    Procedure: CHOLECYSTECTOMY LAPAROSCOPIC, INTRAOPERATIVE CHOLANGIOGRAM;  Surgeon: Marbella Razo MD;  Location: [facility] MAIN OR;  Service: General;  Laterality: N/A;    ERCP N/A 2023    Procedure: ENDOSCOPIC RETROGRADE CHOLANGIOPANCREATOGRAPHY WITH BALLOON SWEEP, SPHINCTEROTOMY AND STENT PLACEMENT;  Surgeon: Rush Lee MD;  Location: [facility] ENDOSCOPY;  Service: Gastroenterology;  Laterality: N/A;  BILE DUCT STONE    ERCP N/A 2023    Procedure: ENDOSCOPIC RETROGRADE CHOLANGIOPANCREATOGRAPHY WITH STENT REMOVAL,  BALLOON SWEEP AND BRUSHINGS;  Surgeon: Rush Lee MD;  Location: [facility] ENDOSCOPY;  Service: Gastroenterology;  Laterality: N/A;  DISTAL BILE DUCT STRICTURE    KIDNEY STONE SURGERY      OTHER SURGICAL HISTORY      Endometriosis       Family History   Adopted: Yes   Family history unknown: Yes       Social History     Socioeconomic History    Marital status: Single    Number of children: 3    Years of education: 12+    Highest education level: Some college, no degree   Tobacco Use    Smoking status: Some Days     Packs/day: 0.25     Years: 37.00     Additional pack years: 0.00     Total pack years: 9.25     Types: Cigarettes     Passive exposure: Current    Smokeless tobacco: Never    Tobacco comments:     1-2 CIGS PER DAY   Vaping Use    Vaping Use: Never used   Substance and Sexual Activity    Alcohol use: Not Currently    Drug use: Never    Sexual activity: Not Currently     Partners: Male     Birth control/protection: Abstinence, Tubal ligation, Post-menopausal, None         Physical Exam:      Appearance: Well-groomed with adequate hygiene, appears to be of stated age. Casually and neatly dressed, maintains good eye contact.   Behavior: Appropriate, cooperative. No acute distress.  Motor: No abnormal movements, tics or tremors are noted. No psychomotor agitation or retardation.  Speech: Coherent, spontaneous, appropriate with normal rate, volume, rhythm, and tone. Normal reaction time to questions. No hyperverbal or pressured speech.   Mood: dysphoric  Affect: Full range, appropriate, congruent with spontaneous emotional reactivity. Normal intensity. No emotional blunting.   Thought content: Negative suicidal ideations, negative homicidal ideations. Patient denies any obsession, compulsion, or phobia. No evidence of delusions.  Perceptions: Negative auditory hallucinations, negative visual hallucinations. Pt does not appear to be actively responding to internal stimuli.   Thought process: Logical,  "goal-directed, coherent, and linear with no evidence of flight of ideas, looseness of associations, thought blocking, circumstantiality, or tangentiality.   Insight/Judgement: Fair/fair  Cognition: Alert and oriented to person, place, and date. Memory intact for recent and remote events. Attention and concentration intact.     Vital Signs:   /61   Pulse 85   Ht 162.6 cm (64\")   Wt 87.7 kg (193 lb 6.4 oz)   BMI 33.20 kg/m²      Lab Results:   Office Visit on 11/30/2023   Component Date Value Ref Range Status    Amphetamine Screen, Urine 11/30/2023 Negative  Negative Final    AMP INTERNAL CONTROL 11/30/2023 Passed  Passed Final    Barbiturates Screen, Urine 11/30/2023 Negative  Negative Final    BARBITURATE INTERNAL CONTROL 11/30/2023 Passed  Passed Final    Buprenorphine, Screen, Urine 11/30/2023 Negative  Negative Final    BUPRENORPHINE INTERNAL CONTROL 11/30/2023 Passed  Passed Final    Benzodiazepine Screen, Urine 11/30/2023 Negative  Negative Final    BENZODIAZEPINE INTERNAL CONTROL 11/30/2023 Passed  Passed Final    Cocaine Screen, Urine 11/30/2023 Negative  Negative Final    COCAINE INTERNAL CONTROL 11/30/2023 Passed  Passed Final    MDMA (ECSTASY) 11/30/2023 Negative  Negative Final    MDMA (ECSTASY) INTERNAL CONTROL 11/30/2023 Passed  Passed Final    Methamphetamine, Ur 11/30/2023 Negative  Negative Final    METHAMPHETAMINE INTERNAL CONTROL 11/30/2023 Passed  Passed Final    Methadone Screen, Urine 11/30/2023 Negative  Negative Final    METHADONE INTERNAL CONTROL 11/30/2023 Passed  Passed Final    Opiate Screen 11/30/2023 Negative  Negative Final    OPIATES INTERNAL CONTROL 11/30/2023 Passed  Passed Final    Oxycodone Screen, Urine 11/30/2023 Negative  Negative Final    OXYCODONE INTERNAL CONTROL 11/30/2023 Passed  Passed Final    Phencyclidine (PCP), Urine 11/30/2023 Negative  Negative Final    PHENCYCLIDINE INTERNAL CONTROL 11/30/2023 Passed  Passed Final    THC, Screen, Urine 11/30/2023 Negative  " Negative Final    THC INTERNAL CONTROL 11/30/2023 Passed  Passed Final    Lot Number 11/30/2023 -   Final    Expiration Date 11/30/2023 -   Final   Office Visit on 09/21/2023   Component Date Value Ref Range Status    Rapid Influenza A Ag 09/21/2023 Negative  Negative Final    Rapid Influenza B Ag 09/21/2023 Negative  Negative Final    Internal Control 09/21/2023 Passed  Passed Final    Lot Number 09/21/2023 -   Final    Expiration Date 09/21/2023 -   Final    SARS Antigen 09/21/2023 Not Detected  Not Detected, Presumptive Negative Final    Influenza A Antigen CHASE 09/21/2023 Not Detected  Not Detected Final    Influenza B Antigen CHASE 09/21/2023 Not Detected  Not Detected Final    Internal Control 09/21/2023 Passed  Passed Final    Lot Number 09/21/2023 -   Final    Expiration Date 09/21/2023 -   Final    COVID19 09/21/2023 Not Detected  Not Detected - Ref. Range Final   Office Visit on 08/09/2023   Component Date Value Ref Range Status    Glucose 08/09/2023 78  65 - 99 mg/dL Final    BUN 08/09/2023 22 (H)  6 - 20 mg/dL Final    Creatinine 08/09/2023 0.95  0.57 - 1.00 mg/dL Final    Sodium 08/09/2023 139  136 - 145 mmol/L Final    Potassium 08/09/2023 3.7  3.5 - 5.2 mmol/L Final    Chloride 08/09/2023 102  98 - 107 mmol/L Final    CO2 08/09/2023 23.0  22.0 - 29.0 mmol/L Final    Calcium 08/09/2023 10.4  8.6 - 10.5 mg/dL Final    Total Protein 08/09/2023 7.9  6.0 - 8.5 g/dL Final    Albumin 08/09/2023 4.8  3.5 - 5.2 g/dL Final    ALT (SGPT) 08/09/2023 35 (H)  1 - 33 U/L Final    AST (SGOT) 08/09/2023 28  1 - 32 U/L Final    Alkaline Phosphatase 08/09/2023 121 (H)  39 - 117 U/L Final    Total Bilirubin 08/09/2023 0.7  0.0 - 1.2 mg/dL Final    Globulin 08/09/2023 3.1  gm/dL Final    A/G Ratio 08/09/2023 1.5  g/dL Final    BUN/Creatinine Ratio 08/09/2023 23.2  7.0 - 25.0 Final    Anion Gap 08/09/2023 14.0  5.0 - 15.0 mmol/L Final    eGFR 08/09/2023 70.5  >60.0 mL/min/1.73 Final    Magnesium 08/09/2023 2.1  1.6 - 2.6  mg/dL Final   Admission on 08/08/2023, Discharged on 08/08/2023   Component Date Value Ref Range Status    Case Report 08/08/2023    Final                    Value:Medical Cytology Report                           Case: [id number]                                  Authorizing Provider:  Rush Lee MD    Collected:           08/08/2023 08:59 AM          Ordering Location:     [facility] Received:            08/09/2023 09:18 AM                                 SUITES                                                                       Pathologist:           Rj Man MD                                                            Specimen:    Common Bile Duct, BILE DUCT BRUSHINGS                                                      Final Diagnosis 08/08/2023    Final                    Value:This result contains rich text formatting which cannot be displayed here.    Clinical Information 08/08/2023    Final                    Value:This result contains rich text formatting which cannot be displayed here.    Gross Description 08/08/2023    Final                    Value:This result contains rich text formatting which cannot be displayed here.    Microscopic Description 08/08/2023    Final                    Value:This result contains rich text formatting which cannot be displayed here.   Admission on 07/31/2023, Discharged on 07/31/2023   Component Date Value Ref Range Status    Glucose 07/31/2023 86  65 - 99 mg/dL Final    BUN 07/31/2023 10  6 - 20 mg/dL Final    Creatinine 07/31/2023 0.74  0.57 - 1.00 mg/dL Final    Sodium 07/31/2023 138  136 - 145 mmol/L Final    Potassium 07/31/2023 3.4 (L)  3.5 - 5.2 mmol/L Final    Chloride 07/31/2023 104  98 - 107 mmol/L Final    CO2 07/31/2023 24.4  22.0 - 29.0 mmol/L Final    Calcium 07/31/2023 9.4  8.6 - 10.5 mg/dL Final    Total Protein 07/31/2023 7.1  6.0 - 8.5 g/dL Final    Albumin 07/31/2023 4.1  3.5 - 5.2 g/dL Final    ALT (SGPT) 07/31/2023 33   1 - 33 U/L Final    AST (SGOT) 07/31/2023 20  1 - 32 U/L Final    Alkaline Phosphatase 07/31/2023 114  39 - 117 U/L Final    Total Bilirubin 07/31/2023 0.4  0.0 - 1.2 mg/dL Final    Globulin 07/31/2023 3.0  gm/dL Final    A/G Ratio 07/31/2023 1.4  g/dL Final    BUN/Creatinine Ratio 07/31/2023 13.5  7.0 - 25.0 Final    Anion Gap 07/31/2023 9.6  5.0 - 15.0 mmol/L Final    eGFR 07/31/2023 95.1  >60.0 mL/min/1.73 Final    Lipase 07/31/2023 93 (H)  13 - 60 U/L Final    Color, UA 07/31/2023 Yellow  Yellow, Straw Final    Appearance, UA 07/31/2023 Clear  Clear Final    pH, UA 07/31/2023 7.0  5.0 - 8.0 Final    Specific Gravity, UA 07/31/2023 1.011  1.005 - 1.030 Final    Glucose, UA 07/31/2023 Negative  Negative Final    Ketones, UA 07/31/2023 Negative  Negative Final    Bilirubin, UA 07/31/2023 Negative  Negative Final    Blood, UA 07/31/2023 Negative  Negative Final    Protein, UA 07/31/2023 Negative  Negative Final    Leuk Esterase, UA 07/31/2023 Trace (A)  Negative Final    Nitrite, UA 07/31/2023 Negative  Negative Final    Urobilinogen, UA 07/31/2023 0.2 E.U./dL  0.2 - 1.0 E.U./dL Final    Lactate 07/31/2023 1.1  0.5 - 2.0 mmol/L Final    Extra Tube 07/31/2023 Hold for add-ons.   Final    Auto resulted.    Extra Tube 07/31/2023 hold for add-on   Final    Auto resulted    Extra Tube 07/31/2023 Hold for add-ons.   Final    Auto resulted.    Extra Tube 07/31/2023 Hold for add-ons.   Final    Auto resulted    WBC 07/31/2023 10.46  3.40 - 10.80 10*3/mm3 Final    RBC 07/31/2023 3.93  3.77 - 5.28 10*6/mm3 Final    Hemoglobin 07/31/2023 11.9 (L)  12.0 - 15.9 g/dL Final    Hematocrit 07/31/2023 36.0  34.0 - 46.6 % Final    MCV 07/31/2023 91.6  79.0 - 97.0 fL Final    MCH 07/31/2023 30.3  26.6 - 33.0 pg Final    MCHC 07/31/2023 33.1  31.5 - 35.7 g/dL Final    RDW 07/31/2023 13.2  12.3 - 15.4 % Final    RDW-SD 07/31/2023 43.4  37.0 - 54.0 fl Final    MPV 07/31/2023 10.2  6.0 - 12.0 fL Final    Platelets 07/31/2023 354  140 -  450 10*3/mm3 Final    Neutrophil % 07/31/2023 54.6  42.7 - 76.0 % Final    Lymphocyte % 07/31/2023 34.3  19.6 - 45.3 % Final    Monocyte % 07/31/2023 5.4  5.0 - 12.0 % Final    Eosinophil % 07/31/2023 4.3  0.3 - 6.2 % Final    Basophil % 07/31/2023 1.1  0.0 - 1.5 % Final    Immature Grans % 07/31/2023 0.3  0.0 - 0.5 % Final    Neutrophils, Absolute 07/31/2023 5.72  1.70 - 7.00 10*3/mm3 Final    Lymphocytes, Absolute 07/31/2023 3.59 (H)  0.70 - 3.10 10*3/mm3 Final    Monocytes, Absolute 07/31/2023 0.56  0.10 - 0.90 10*3/mm3 Final    Eosinophils, Absolute 07/31/2023 0.45 (H)  0.00 - 0.40 10*3/mm3 Final    Basophils, Absolute 07/31/2023 0.11  0.00 - 0.20 10*3/mm3 Final    Immature Grans, Absolute 07/31/2023 0.03  0.00 - 0.05 10*3/mm3 Final    nRBC 07/31/2023 0.0  0.0 - 0.2 /100 WBC Final    RBC, UA 07/31/2023 3-5 (A)  None Seen /HPF Final    WBC, UA 07/31/2023 3-5 (A)  None Seen /HPF Final    Bacteria, UA 07/31/2023 None Seen  None Seen /HPF Final    Squamous Epithelial Cells, UA 07/31/2023 3-6 (A)  None Seen, 0-2 /HPF Final    Hyaline Casts, UA 07/31/2023 0-2  None Seen /LPF Final    Methodology 07/31/2023 Automated Microscopy   Final   Lab on 07/25/2023   Component Date Value Ref Range Status    Glucose 07/25/2023 101 (H)  65 - 99 mg/dL Final    BUN 07/25/2023 9  6 - 20 mg/dL Final    Creatinine 07/25/2023 0.76  0.57 - 1.00 mg/dL Final    Sodium 07/25/2023 137  136 - 145 mmol/L Final    Potassium 07/25/2023 3.1 (L)  3.5 - 5.2 mmol/L Final    Chloride 07/25/2023 99  98 - 107 mmol/L Final    CO2 07/25/2023 23.9  22.0 - 29.0 mmol/L Final    Calcium 07/25/2023 9.8  8.6 - 10.5 mg/dL Final    Total Protein 07/25/2023 7.7  6.0 - 8.5 g/dL Final    Albumin 07/25/2023 4.2  3.5 - 5.2 g/dL Final    ALT (SGPT) 07/25/2023 66 (H)  1 - 33 U/L Final    AST (SGOT) 07/25/2023 24  1 - 32 U/L Final    Alkaline Phosphatase 07/25/2023 128 (H)  39 - 117 U/L Final    Total Bilirubin 07/25/2023 0.5  0.0 - 1.2 mg/dL Final    Globulin  07/25/2023 3.5  gm/dL Final    A/G Ratio 07/25/2023 1.2  g/dL Final    BUN/Creatinine Ratio 07/25/2023 11.8  7.0 - 25.0 Final    Anion Gap 07/25/2023 14.1  5.0 - 15.0 mmol/L Final    eGFR 07/25/2023 92.1  >60.0 mL/min/1.73 Final   Admission on 07/17/2023, Discharged on 07/19/2023   Component Date Value Ref Range Status    Glucose 07/17/2023 120 (H)  65 - 99 mg/dL Final    BUN 07/17/2023 14  6 - 20 mg/dL Final    Creatinine 07/17/2023 0.80  0.57 - 1.00 mg/dL Final    Sodium 07/17/2023 143  136 - 145 mmol/L Final    Potassium 07/17/2023 3.5  3.5 - 5.2 mmol/L Final    Chloride 07/17/2023 107  98 - 107 mmol/L Final    CO2 07/17/2023 24.6  22.0 - 29.0 mmol/L Final    Calcium 07/17/2023 9.8  8.6 - 10.5 mg/dL Final    Total Protein 07/17/2023 7.2  6.0 - 8.5 g/dL Final    Albumin 07/17/2023 4.4  3.5 - 5.2 g/dL Final    ALT (SGPT) 07/17/2023 16  1 - 33 U/L Final    AST (SGOT) 07/17/2023 18  1 - 32 U/L Final    Alkaline Phosphatase 07/17/2023 87  39 - 117 U/L Final    Total Bilirubin 07/17/2023 0.3  0.0 - 1.2 mg/dL Final    Globulin 07/17/2023 2.8  gm/dL Final    A/G Ratio 07/17/2023 1.6  g/dL Final    BUN/Creatinine Ratio 07/17/2023 17.5  7.0 - 25.0 Final    Anion Gap 07/17/2023 11.4  5.0 - 15.0 mmol/L Final    eGFR 07/17/2023 86.6  >60.0 mL/min/1.73 Final    Lipase 07/17/2023 27  13 - 60 U/L Final    Color, UA 07/17/2023 Yellow  Yellow, Straw Final    Appearance, UA 07/17/2023 Clear  Clear Final    pH, UA 07/17/2023 5.5  5.0 - 8.0 Final    Specific Gravity, UA 07/17/2023 1.016  1.005 - 1.030 Final    Glucose, UA 07/17/2023 Negative  Negative Final    Ketones, UA 07/17/2023 Negative  Negative Final    Bilirubin, UA 07/17/2023 Negative  Negative Final    Blood, UA 07/17/2023 Negative  Negative Final    Protein, UA 07/17/2023 Negative  Negative Final    Leuk Esterase, UA 07/17/2023 Negative  Negative Final    Nitrite, UA 07/17/2023 Negative  Negative Final    Urobilinogen, UA 07/17/2023 1.0 E.U./dL  0.2 - 1.0 E.U./dL Final     Lactate 07/17/2023 1.8  0.5 - 2.0 mmol/L Final    Extra Tube 07/17/2023 Hold for add-ons.   Final    Auto resulted.    Extra Tube 07/17/2023 hold for add-on   Final    Auto resulted    Extra Tube 07/17/2023 Hold for add-ons.   Final    Auto resulted.    Extra Tube 07/17/2023 Hold for add-ons.   Final    Auto resulted    WBC 07/17/2023 8.49  3.40 - 10.80 10*3/mm3 Final    RBC 07/17/2023 4.58  3.77 - 5.28 10*6/mm3 Final    Hemoglobin 07/17/2023 13.9  12.0 - 15.9 g/dL Final    Hematocrit 07/17/2023 40.7  34.0 - 46.6 % Final    MCV 07/17/2023 88.9  79.0 - 97.0 fL Final    MCH 07/17/2023 30.3  26.6 - 33.0 pg Final    MCHC 07/17/2023 34.2  31.5 - 35.7 g/dL Final    RDW 07/17/2023 12.8  12.3 - 15.4 % Final    RDW-SD 07/17/2023 41.7  37.0 - 54.0 fl Final    MPV 07/17/2023 11.4  6.0 - 12.0 fL Final    Platelets 07/17/2023 224  140 - 450 10*3/mm3 Final    Neutrophil % 07/17/2023 30.4 (L)  42.7 - 76.0 % Final    Lymphocyte % 07/17/2023 58.8 (H)  19.6 - 45.3 % Final    Monocyte % 07/17/2023 6.6  5.0 - 12.0 % Final    Eosinophil % 07/17/2023 3.2  0.3 - 6.2 % Final    Basophil % 07/17/2023 0.8  0.0 - 1.5 % Final    Immature Grans % 07/17/2023 0.2  0.0 - 0.5 % Final    Neutrophils, Absolute 07/17/2023 2.58  1.70 - 7.00 10*3/mm3 Final    Lymphocytes, Absolute 07/17/2023 4.99 (H)  0.70 - 3.10 10*3/mm3 Final    Monocytes, Absolute 07/17/2023 0.56  0.10 - 0.90 10*3/mm3 Final    Eosinophils, Absolute 07/17/2023 0.27  0.00 - 0.40 10*3/mm3 Final    Basophils, Absolute 07/17/2023 0.07  0.00 - 0.20 10*3/mm3 Final    Immature Grans, Absolute 07/17/2023 0.02  0.00 - 0.05 10*3/mm3 Final    nRBC 07/17/2023 0.0  0.0 - 0.2 /100 WBC Final    Blood Culture 07/17/2023 No growth at 5 days   Final    Blood Culture 07/17/2023 No growth at 5 days   Final    Case Report 07/17/2023    Final                    Value:Surgical Pathology Report                         Case: [id number]                                  Authorizing Provider:  Marbella Razo  MD ELOISE    Collected:           07/17/2023 05:51 PM          Ordering Location:     [facility] MAIN Received:            07/18/2023 08:24 AM                                 OR                                                                           Pathologist:           Vianca Morfin DO                                                       Specimen:    Gallbladder, gallbladder and contents                                                      Clinical Information 07/17/2023    Final                    Value:This result contains rich text formatting which cannot be displayed here.    Final Diagnosis 07/17/2023    Final                    Value:This result contains rich text formatting which cannot be displayed here.    Gross Description 07/17/2023    Final                    Value:This result contains rich text formatting which cannot be displayed here.    Microscopic Description 07/17/2023    Final                    Value:This result contains rich text formatting which cannot be displayed here.    WBC 07/18/2023 8.33  3.40 - 10.80 10*3/mm3 Final    RBC 07/18/2023 4.30  3.77 - 5.28 10*6/mm3 Final    Hemoglobin 07/18/2023 13.2  12.0 - 15.9 g/dL Final    Hematocrit 07/18/2023 39.4  34.0 - 46.6 % Final    MCV 07/18/2023 91.6  79.0 - 97.0 fL Final    MCH 07/18/2023 30.7  26.6 - 33.0 pg Final    MCHC 07/18/2023 33.5  31.5 - 35.7 g/dL Final    RDW 07/18/2023 12.9  12.3 - 15.4 % Final    RDW-SD 07/18/2023 43.0  37.0 - 54.0 fl Final    MPV 07/18/2023 11.4  6.0 - 12.0 fL Final    Platelets 07/18/2023 215  140 - 450 10*3/mm3 Final    Neutrophil % 07/18/2023 88.7 (H)  42.7 - 76.0 % Final    Lymphocyte % 07/18/2023 8.6 (L)  19.6 - 45.3 % Final    Monocyte % 07/18/2023 2.3 (L)  5.0 - 12.0 % Final    Eosinophil % 07/18/2023 0.0 (L)  0.3 - 6.2 % Final    Basophil % 07/18/2023 0.2  0.0 - 1.5 % Final    Immature Grans % 07/18/2023 0.2  0.0 - 0.5 % Final    Neutrophils, Absolute 07/18/2023 7.38 (H)  1.70 - 7.00  10*3/mm3 Final    Lymphocytes, Absolute 07/18/2023 0.72  0.70 - 3.10 10*3/mm3 Final    Monocytes, Absolute 07/18/2023 0.19  0.10 - 0.90 10*3/mm3 Final    Eosinophils, Absolute 07/18/2023 0.00  0.00 - 0.40 10*3/mm3 Final    Basophils, Absolute 07/18/2023 0.02  0.00 - 0.20 10*3/mm3 Final    Immature Grans, Absolute 07/18/2023 0.02  0.00 - 0.05 10*3/mm3 Final    nRBC 07/18/2023 0.0  0.0 - 0.2 /100 WBC Final    Glucose 07/18/2023 126 (H)  65 - 99 mg/dL Final    BUN 07/18/2023 13  6 - 20 mg/dL Final    Creatinine 07/18/2023 0.89  0.57 - 1.00 mg/dL Final    Sodium 07/18/2023 140  136 - 145 mmol/L Final    Potassium 07/18/2023 3.7  3.5 - 5.2 mmol/L Final    Chloride 07/18/2023 105  98 - 107 mmol/L Final    CO2 07/18/2023 24.5  22.0 - 29.0 mmol/L Final    Calcium 07/18/2023 9.2  8.6 - 10.5 mg/dL Final    Total Protein 07/18/2023 6.8  6.0 - 8.5 g/dL Final    Albumin 07/18/2023 4.0  3.5 - 5.2 g/dL Final    ALT (SGPT) 07/18/2023 526 (H)  1 - 33 U/L Final    AST (SGOT) 07/18/2023 505 (H)  1 - 32 U/L Final    Alkaline Phosphatase 07/18/2023 133 (H)  39 - 117 U/L Final    Total Bilirubin 07/18/2023 0.9  0.0 - 1.2 mg/dL Final    Globulin 07/18/2023 2.8  gm/dL Final    A/G Ratio 07/18/2023 1.4  g/dL Final    BUN/Creatinine Ratio 07/18/2023 14.6  7.0 - 25.0 Final    Anion Gap 07/18/2023 10.5  5.0 - 15.0 mmol/L Final    eGFR 07/18/2023 76.2  >60.0 mL/min/1.73 Final    Magnesium 07/18/2023 1.7  1.6 - 2.6 mg/dL Final    Protime 07/18/2023 13.6  11.8 - 14.9 Seconds Final    INR 07/18/2023 1.03  0.86 - 1.15 Final    Glucose 07/19/2023 91  65 - 99 mg/dL Final    BUN 07/19/2023 15  6 - 20 mg/dL Final    Creatinine 07/19/2023 0.70  0.57 - 1.00 mg/dL Final    Sodium 07/19/2023 142  136 - 145 mmol/L Final    Potassium 07/19/2023 2.9 (L)  3.5 - 5.2 mmol/L Final    Chloride 07/19/2023 108 (H)  98 - 107 mmol/L Final    CO2 07/19/2023 25.8  22.0 - 29.0 mmol/L Final    Calcium 07/19/2023 8.3 (L)  8.6 - 10.5 mg/dL Final    Total Protein 07/19/2023  5.5 (L)  6.0 - 8.5 g/dL Final    Albumin 07/19/2023 3.2 (L)  3.5 - 5.2 g/dL Final    ALT (SGPT) 07/19/2023 270 (H)  1 - 33 U/L Final    AST (SGOT) 07/19/2023 128 (H)  1 - 32 U/L Final    Alkaline Phosphatase 07/19/2023 95  39 - 117 U/L Final    Total Bilirubin 07/19/2023 0.5  0.0 - 1.2 mg/dL Final    Globulin 07/19/2023 2.3  gm/dL Final    A/G Ratio 07/19/2023 1.4  g/dL Final    BUN/Creatinine Ratio 07/19/2023 21.4  7.0 - 25.0 Final    Anion Gap 07/19/2023 8.2  5.0 - 15.0 mmol/L Final    eGFR 07/19/2023 101.6  >60.0 mL/min/1.73 Final   Office Visit on 06/16/2023   Component Date Value Ref Range Status    Amphetamine Screen, Urine 06/16/2023 Negative  Negative Final    Barbiturates Screen, Urine 06/16/2023 Negative  Negative Final    Buprenorphine, Screen, Urine 06/16/2023 Negative  Negative Final    Benzodiazepine Screen, Urine 06/16/2023 Negative  Negative Final    Cocaine Screen, Urine 06/16/2023 Negative  Negative Final    MDMA (ECSTASY) 06/16/2023 Negative  Negative Final    Methamphetamine, Ur 06/16/2023 Negative  Negative Final    Methadone Screen, Urine 06/16/2023 Negative  Negative Final    Opiate Screen 06/16/2023 Negative  Negative Final    Oxycodone Screen, Urine 06/16/2023 Negative  Negative Final    Phencyclidine (PCP), Urine 06/16/2023 Negative  Negative Final    THC, Screen, Urine 06/16/2023 Negative  Negative Final    Lot Number 06/16/2023 [id number]   Final    Expiration Date 06/16/2023 3/31/24   Final    Glucose 06/16/2023 75  65 - 99 mg/dL Final    BUN 06/16/2023 20  6 - 20 mg/dL Final    Creatinine 06/16/2023 0.72  0.57 - 1.00 mg/dL Final    Sodium 06/16/2023 143  136 - 145 mmol/L Final    Potassium 06/16/2023 3.5  3.5 - 5.2 mmol/L Final    Chloride 06/16/2023 108 (H)  98 - 107 mmol/L Final    CO2 06/16/2023 22.4  22.0 - 29.0 mmol/L Final    Calcium 06/16/2023 9.6  8.6 - 10.5 mg/dL Final    Total Protein 06/16/2023 7.3  6.0 - 8.5 g/dL Final    Albumin 06/16/2023 4.3  3.5 - 5.2 g/dL Final    ALT  (SGPT) 06/16/2023 19  1 - 33 U/L Final    AST (SGOT) 06/16/2023 23  1 - 32 U/L Final    Alkaline Phosphatase 06/16/2023 84  39 - 117 U/L Final    Total Bilirubin 06/16/2023 0.4  0.0 - 1.2 mg/dL Final    Globulin 06/16/2023 3.0  gm/dL Final    A/G Ratio 06/16/2023 1.4  g/dL Final    BUN/Creatinine Ratio 06/16/2023 27.8 (H)  7.0 - 25.0 Final    Anion Gap 06/16/2023 12.6  5.0 - 15.0 mmol/L Final    eGFR 06/16/2023 98.3  >60.0 mL/min/1.73 Final    TSH 06/16/2023 1.190  0.270 - 4.200 uIU/mL Final    Total Cholesterol 06/16/2023 211 (H)  0 - 200 mg/dL Final    Triglycerides 06/16/2023 70  0 - 150 mg/dL Final    HDL Cholesterol 06/16/2023 52  40 - 60 mg/dL Final    LDL Cholesterol  06/16/2023 147 (H)  0 - 100 mg/dL Final    VLDL Cholesterol 06/16/2023 12  5 - 40 mg/dL Final    LDL/HDL Ratio 06/16/2023 2.79   Final    WBC 06/16/2023 5.25  3.40 - 10.80 10*3/mm3 Final    RBC 06/16/2023 4.69  3.77 - 5.28 10*6/mm3 Final    Hemoglobin 06/16/2023 14.0  12.0 - 15.9 g/dL Final    Hematocrit 06/16/2023 42.6  34.0 - 46.6 % Final    MCV 06/16/2023 90.8  79.0 - 97.0 fL Final    MCH 06/16/2023 29.9  26.6 - 33.0 pg Final    MCHC 06/16/2023 32.9  31.5 - 35.7 g/dL Final    RDW 06/16/2023 12.7  12.3 - 15.4 % Final    RDW-SD 06/16/2023 41.6  37.0 - 54.0 fl Final    MPV 06/16/2023 12.3 (H)  6.0 - 12.0 fL Final    Platelets 06/16/2023 211  140 - 450 10*3/mm3 Final    Neutrophil % 06/16/2023 45.5  42.7 - 76.0 % Final    Lymphocyte % 06/16/2023 44.4  19.6 - 45.3 % Final    Monocyte % 06/16/2023 6.9  5.0 - 12.0 % Final    Eosinophil % 06/16/2023 1.5  0.3 - 6.2 % Final    Basophil % 06/16/2023 1.3  0.0 - 1.5 % Final    Immature Grans % 06/16/2023 0.4  0.0 - 0.5 % Final    Neutrophils, Absolute 06/16/2023 2.39  1.70 - 7.00 10*3/mm3 Final    Lymphocytes, Absolute 06/16/2023 2.33  0.70 - 3.10 10*3/mm3 Final    Monocytes, Absolute 06/16/2023 0.36  0.10 - 0.90 10*3/mm3 Final    Eosinophils, Absolute 06/16/2023 0.08  0.00 - 0.40 10*3/mm3 Final     Basophils, Absolute 06/16/2023 0.07  0.00 - 0.20 10*3/mm3 Final    Immature Grans, Absolute 06/16/2023 0.02  0.00 - 0.05 10*3/mm3 Final    nRBC 06/16/2023 0.0  0.0 - 0.2 /100 WBC Final   Office Visit on 03/10/2023   Component Date Value Ref Range Status    Glucose 03/10/2023 77  65 - 99 mg/dL Final    BUN 03/10/2023 20  6 - 20 mg/dL Final    Creatinine 03/10/2023 0.80  0.57 - 1.00 mg/dL Final    Sodium 03/10/2023 139  136 - 145 mmol/L Final    Potassium 03/10/2023 3.6  3.5 - 5.2 mmol/L Final    Chloride 03/10/2023 102  98 - 107 mmol/L Final    CO2 03/10/2023 27.0  22.0 - 29.0 mmol/L Final    Calcium 03/10/2023 9.1  8.6 - 10.5 mg/dL Final    BUN/Creatinine Ratio 03/10/2023 25.0  7.0 - 25.0 Final    Anion Gap 03/10/2023 10.0  5.0 - 15.0 mmol/L Final    eGFR 03/10/2023 86.6  >60.0 mL/min/1.73 Final    Magnesium 03/10/2023 2.2  1.6 - 2.6 mg/dL Final    SARS Antigen 03/10/2023 Not Detected  Not Detected, Presumptive Negative Final    Influenza A Antigen CHASE 03/10/2023 Not Detected  Not Detected Final    Influenza B Antigen CHASE 03/10/2023 Not Detected  Not Detected Final    Internal Control 03/10/2023 Passed  Passed Final    Lot Number 03/10/2023 [id number]   Final    Expiration Date 03/10/2023 12/09/2023   Final    COVID19 03/10/2023 Not Detected  Not Detected - Ref. Range Final       EKG Results:  No orders to display       Imaging Results:  No Images in the past 120 days found..      Assessment & Plan   Problems Addressed this Visit    None  Visit Diagnoses       Panic attacks    -  Primary    Relevant Orders    Urine Drug Screen - Urine, Clean Catch    Current mild episode of major depressive disorder without prior episode        Relevant Medications    venlafaxine XR (Effexor XR) 37.5 MG 24 hr capsule    Post traumatic stress disorder (PTSD)        Relevant Medications    venlafaxine XR (Effexor XR) 37.5 MG 24 hr capsule    Mood disorder of depressed type        Relevant Medications    venlafaxine XR (Effexor XR) 37.5  MG 24 hr capsule          Diagnoses         Codes Comments    Panic attacks    -  Primary ICD-10-CM: F41.0  ICD-9-CM: 300.01     Current mild episode of major depressive disorder without prior episode     ICD-10-CM: F32.0  ICD-9-CM: 296.21     Post traumatic stress disorder (PTSD)     ICD-10-CM: F43.10  ICD-9-CM: 309.81     Mood disorder of depressed type     ICD-10-CM: F32.A  ICD-9-CM: 311             Visit Diagnoses:    ICD-10-CM ICD-9-CM   1. Panic attacks  F41.0 300.01   2. Current mild episode of major depressive disorder without prior episode  F32.0 296.21   3. Post traumatic stress disorder (PTSD)  F43.10 309.81   4. Mood disorder of depressed type  F32.A 311       PLAN:  Safety: Patient denies SI, HI and AVH.   Therapy: patient to call Jayde Guerra  Risk Assessment: Risk of self-harm acutely is moderate.  Risk factors include anxiety disorder, mood disorder, and recent psychosocial stressors (menopause). Protective factors include no family history, denies access to guns/weapons, no present SI, no history of suicide attempts or self-harm in the past, minimal AODA, healthcare seeking, future orientation, willingness to engage in care.  Risk of self-harm chronically is also moderate, but could be further elevated in the event of treatment noncompliance and/or AODA.  Labs/Diagnostics Ordered:   Orders Placed This Encounter   Procedures    Urine Drug Screen - Urine, Clean Catch     Medications:   New Medications Ordered This Visit   Medications    venlafaxine XR (Effexor XR) 37.5 MG 24 hr capsule     Sig: Take 1 capsule by mouth Daily for 90 days.     Dispense:  30 capsule     Refill:  2     Follow up:   No follow-ups on file.      TREATMENT PLAN/GOALS: Continue supportive psychotherapy efforts and medications as indicated. Treatment and medication options discussed during today's visit. Patient ackowledged and verbally consented to continue with current treatment plan and was educated on the importance of  compliance with treatment and follow-up appointments.    MEDICATION ISSUES:  DANNIE reviewed as expected.  Discussed medication options and treatment plan of prescribed medication as well as the risks, benefits, and side effects including potential falls, possible impaired driving and metabolic adversities among others. Patient is agreeable to call the office with any worsening of symptoms or onset of side effects. Patient is agreeable to call 911 or go to the nearest ER should he/she begin having SI/HI. No medication side effects or related complaints today.            This document has been electronically signed by TIFFANY Serrano  February 20, 2024 11:31 EST      Part of this note may be an electronic transcription/translation of spoken language to printed text using the Dragon Dictation System.

## 2024-02-21 RX ORDER — GABAPENTIN 300 MG/1
300 CAPSULE ORAL 4 TIMES DAILY
Qty: 120 CAPSULE | Refills: 2 | Status: SHIPPED | OUTPATIENT
Start: 2024-02-21

## 2024-02-22 ENCOUNTER — LAB (OUTPATIENT)
Dept: LAB | Facility: HOSPITAL | Age: 59
End: 2024-02-22
Payer: COMMERCIAL

## 2024-02-22 DIAGNOSIS — F41.0 PANIC ATTACKS: ICD-10-CM

## 2024-02-22 LAB
AMPHET+METHAMPHET UR QL: NEGATIVE
BARBITURATES UR QL SCN: NEGATIVE
BENZODIAZ UR QL SCN: NEGATIVE
CANNABINOIDS SERPL QL: NEGATIVE
COCAINE UR QL: NEGATIVE
FENTANYL UR-MCNC: NEGATIVE NG/ML
METHADONE UR QL SCN: NEGATIVE
OPIATES UR QL: NEGATIVE
OXYCODONE UR QL SCN: NEGATIVE

## 2024-02-22 PROCEDURE — 80307 DRUG TEST PRSMV CHEM ANLYZR: CPT

## 2024-02-26 DIAGNOSIS — F41.0 PANIC ATTACKS: Primary | ICD-10-CM

## 2024-02-26 DIAGNOSIS — F32.A MOOD DISORDER OF DEPRESSED TYPE: ICD-10-CM

## 2024-02-26 DIAGNOSIS — F43.10 POST TRAUMATIC STRESS DISORDER (PTSD): ICD-10-CM

## 2024-02-26 RX ORDER — LORAZEPAM 0.5 MG/1
TABLET ORAL
Qty: 10 TABLET | Refills: 0 | Status: SHIPPED | OUTPATIENT
Start: 2024-02-26

## 2024-03-25 DIAGNOSIS — F41.0 PANIC ATTACKS: ICD-10-CM

## 2024-03-25 RX ORDER — CLONIDINE HYDROCHLORIDE 0.1 MG/1
TABLET ORAL
Qty: 60 TABLET | Refills: 1 | OUTPATIENT
Start: 2024-03-25

## 2024-03-25 NOTE — TELEPHONE ENCOUNTER
I ATTEMPTED TO CONTACT PT BY TELEPHONE TO OBTAIN THIS INFORMATION.    PT DID NOT ANSWER.  I LEFT A VOICEMAIL REQUESTING THAT PT CALL OUR OFFICE BACK.

## 2024-03-25 NOTE — TELEPHONE ENCOUNTER
REFILL REQUEST FROM THE PHARMACY FOR PTS CLONIDINE 0.1 MG TABLETS.    cloNIDine (Catapres) 0.1 MG tablet (12/29/2023)    -THIS MEDICATION WAS DISCONTINUED ON 02/20/2024 BY PROVIDER.    FOLLOW UP APPT ON 04/03/2024.  PT LAST SEEN ON 02/20/2024.

## 2024-03-26 NOTE — TELEPHONE ENCOUNTER
ATTEMPTED TO CONTACT PT(PATIENT) PER PROVIDER'S INSTRUCTIONS      NO ANSWER      LEFT VOICEMAIL WITH INSTRUCTIONS TO RETURN CALL TO OFFICE AT [phone]

## 2024-03-27 NOTE — TELEPHONE ENCOUNTER
Patient can reach back out should she desire to restart medication. No further action needed at this time. EMN

## 2024-03-27 NOTE — TELEPHONE ENCOUNTER
ATTEMPTED TO CONTACT PT(PATIENT) PER PROVIDER'S INSTRUCTIONS      NO ANSWER      LEFT VOICEMAIL WITH INSTRUCTIONS TO RETURN CALL TO OFFICE AT [phone]

## 2024-03-28 NOTE — TELEPHONE ENCOUNTER
PT(PATIENT) VERIFIED     CONTACTED PT(PATIENT) PER PROVIDER'S INSTRUCTIONS     PT(PATIENT) CONFIRMED THAT SHE HAS STOPPED THE cloNIDine (Catapres) 0.1 MG tablet (2023)     PT(PATIENT) VERBALIZED UNDERSTANDING AND HAD NO FURTHER QUESTIONS AT THIS TIME

## 2024-04-04 DIAGNOSIS — Z12.31 SCREENING MAMMOGRAM FOR BREAST CANCER: Primary | ICD-10-CM

## 2024-04-04 DIAGNOSIS — Z12.31 ENCOUNTER FOR SCREENING MAMMOGRAM FOR MALIGNANT NEOPLASM OF BREAST: ICD-10-CM

## 2024-04-26 ENCOUNTER — TELEPHONE (OUTPATIENT)
Dept: PSYCHIATRY | Facility: CLINIC | Age: 59
End: 2024-04-26
Payer: COMMERCIAL

## 2024-04-26 NOTE — TELEPHONE ENCOUNTER
Patient was referred out for psychotherapy on 12/29/2023, several attempts made to follow up on referral order including mailing a contact letter with no success, closing out referral.

## 2024-05-06 DIAGNOSIS — I10 ESSENTIAL HYPERTENSION: ICD-10-CM

## 2024-05-07 RX ORDER — CHLORTHALIDONE 25 MG/1
25 TABLET ORAL DAILY
Qty: 90 TABLET | Refills: 2 | Status: SHIPPED | OUTPATIENT
Start: 2024-05-07

## 2024-05-07 RX ORDER — AMLODIPINE BESYLATE AND BENAZEPRIL HYDROCHLORIDE 10; 20 MG/1; MG/1
1 CAPSULE ORAL DAILY
Qty: 90 CAPSULE | Refills: 2 | Status: SHIPPED | OUTPATIENT
Start: 2024-05-07

## 2024-06-07 DIAGNOSIS — G47.00 INSOMNIA, UNSPECIFIED TYPE: ICD-10-CM

## 2024-06-07 RX ORDER — TRAZODONE HYDROCHLORIDE 50 MG/1
50 TABLET ORAL NIGHTLY
Qty: 90 TABLET | Refills: 1 | Status: SHIPPED | OUTPATIENT
Start: 2024-06-07

## 2024-06-20 ENCOUNTER — TELEPHONE (OUTPATIENT)
Dept: INTERNAL MEDICINE | Facility: CLINIC | Age: 59
End: 2024-06-20
Payer: COMMERCIAL

## 2024-06-20 DIAGNOSIS — I10 ESSENTIAL HYPERTENSION: Primary | ICD-10-CM

## 2024-06-20 RX ORDER — AMLODIPINE AND BENAZEPRIL HYDROCHLORIDE 10; 40 MG/1; MG/1
1 CAPSULE ORAL DAILY
Qty: 90 CAPSULE | Refills: 2 | Status: SHIPPED | OUTPATIENT
Start: 2024-06-20

## 2024-06-20 NOTE — TELEPHONE ENCOUNTER
Spoke with patient. Verified .     Patient made aware of Dr. Li recommendations and patient refused ER.   Patient has been taking lotrel and so I explained Dr. Li has sent an increased dose of this to the pharmacy and she should NOT take another dose of the Chlorthalidone     Patient verbalized understanding.

## 2024-06-20 NOTE — TELEPHONE ENCOUNTER
Spoke with patient. Transferred from [facility].  Patient reports her blood pressure is 171/95 and she is having some shortness of breath. Patient denies chest pain at the time of the call but reports this morning she had some on her way to work.  I advised patient go to the ER and she refused. Patient states she lives paycheck to [employer] and can not miss a day of work.   Patient wants to know if she can take a second dose of the Chlorthalidone. Patient is planning to  the amlodipine-benazepril tomorrow morning at [facility] when they open.

## 2024-06-20 NOTE — TELEPHONE ENCOUNTER
I'm afraid that I also recommend evaluation in the ER for her chest pain.    In terms of her blood pressure, I'm unclear on whether or not she's taking her lotrel.  If she is, I'd like to send a higher dose of this.    I wouldn't recommend taking more than one pill daily of chlorthalidone.

## 2024-06-24 ENCOUNTER — HOSPITAL ENCOUNTER (EMERGENCY)
Facility: HOSPITAL | Age: 59
Discharge: HOME | End: 2024-06-24
Attending: EMERGENCY MEDICINE | Admitting: EMERGENCY MEDICINE
Payer: COMMERCIAL

## 2024-06-24 ENCOUNTER — APPOINTMENT (OUTPATIENT)
Dept: GENERAL RADIOLOGY | Facility: HOSPITAL | Age: 59
End: 2024-06-24
Payer: COMMERCIAL

## 2024-06-24 VITALS
HEIGHT: 64 IN | WEIGHT: 206.13 LBS | HEART RATE: 76 BPM | TEMPERATURE: 98 F | RESPIRATION RATE: 14 BRPM | DIASTOLIC BLOOD PRESSURE: 57 MMHG | OXYGEN SATURATION: 96 % | SYSTOLIC BLOOD PRESSURE: 107 MMHG | BODY MASS INDEX: 35.19 KG/M2

## 2024-06-24 DIAGNOSIS — K04.7 DENTAL ABSCESS: Primary | ICD-10-CM

## 2024-06-24 LAB
ALBUMIN SERPL-MCNC: 3.9 G/DL (ref 3.5–5.2)
ALBUMIN/GLOB SERPL: 1.4 G/DL
ALP SERPL-CCNC: 92 U/L (ref 39–117)
ALT SERPL W P-5'-P-CCNC: 31 U/L (ref 1–33)
ANION GAP SERPL CALCULATED.3IONS-SCNC: 11.8 MMOL/L (ref 5–15)
AST SERPL-CCNC: 32 U/L (ref 1–32)
BASOPHILS # BLD AUTO: 0.07 10*3/MM3 (ref 0–0.2)
BASOPHILS NFR BLD AUTO: 0.7 % (ref 0–1.5)
BILIRUB SERPL-MCNC: 0.2 MG/DL (ref 0–1.2)
BUN SERPL-MCNC: 15 MG/DL (ref 6–20)
BUN/CREAT SERPL: 17.4 (ref 7–25)
CALCIUM SPEC-SCNC: 8.8 MG/DL (ref 8.6–10.5)
CHLORIDE SERPL-SCNC: 104 MMOL/L (ref 98–107)
CO2 SERPL-SCNC: 25.2 MMOL/L (ref 22–29)
CREAT SERPL-MCNC: 0.86 MG/DL (ref 0.57–1)
DEPRECATED RDW RBC AUTO: 45 FL (ref 37–54)
EGFRCR SERPLBLD CKD-EPI 2021: 78.9 ML/MIN/1.73
EOSINOPHIL # BLD AUTO: 0.11 10*3/MM3 (ref 0–0.4)
EOSINOPHIL NFR BLD AUTO: 1.1 % (ref 0.3–6.2)
ERYTHROCYTE [DISTWIDTH] IN BLOOD BY AUTOMATED COUNT: 13.9 % (ref 12.3–15.4)
GEN 5 2HR TROPONIN T REFLEX: 7 NG/L
GLOBULIN UR ELPH-MCNC: 2.7 GM/DL
GLUCOSE SERPL-MCNC: 102 MG/DL (ref 65–99)
HCT VFR BLD AUTO: 35.3 % (ref 34–46.6)
HGB BLD-MCNC: 11.9 G/DL (ref 12–15.9)
HOLD SPECIMEN: NORMAL
HOLD SPECIMEN: NORMAL
IMM GRANULOCYTES # BLD AUTO: 0.03 10*3/MM3 (ref 0–0.05)
IMM GRANULOCYTES NFR BLD AUTO: 0.3 % (ref 0–0.5)
LIPASE SERPL-CCNC: 23 U/L (ref 13–60)
LYMPHOCYTES # BLD AUTO: 3.36 10*3/MM3 (ref 0.7–3.1)
LYMPHOCYTES NFR BLD AUTO: 33.1 % (ref 19.6–45.3)
MAGNESIUM SERPL-MCNC: 1.7 MG/DL (ref 1.6–2.6)
MCH RBC QN AUTO: 30.3 PG (ref 26.6–33)
MCHC RBC AUTO-ENTMCNC: 33.7 G/DL (ref 31.5–35.7)
MCV RBC AUTO: 89.8 FL (ref 79–97)
MONOCYTES # BLD AUTO: 0.78 10*3/MM3 (ref 0.1–0.9)
MONOCYTES NFR BLD AUTO: 7.7 % (ref 5–12)
NEUTROPHILS NFR BLD AUTO: 5.81 10*3/MM3 (ref 1.7–7)
NEUTROPHILS NFR BLD AUTO: 57.1 % (ref 42.7–76)
NRBC BLD AUTO-RTO: 0 /100 WBC (ref 0–0.2)
NT-PROBNP SERPL-MCNC: 438.9 PG/ML (ref 0–900)
PLATELET # BLD AUTO: 205 10*3/MM3 (ref 140–450)
PMV BLD AUTO: 11.3 FL (ref 6–12)
POTASSIUM SERPL-SCNC: 3.6 MMOL/L (ref 3.5–5.2)
PROT SERPL-MCNC: 6.6 G/DL (ref 6–8.5)
QT INTERVAL: 362 MS
QTC INTERVAL: 436 MS
RBC # BLD AUTO: 3.93 10*6/MM3 (ref 3.77–5.28)
SODIUM SERPL-SCNC: 141 MMOL/L (ref 136–145)
TROPONIN T DELTA: 0 NG/L
TROPONIN T SERPL HS-MCNC: 7 NG/L
WBC NRBC COR # BLD AUTO: 10.16 10*3/MM3 (ref 3.4–10.8)
WHOLE BLOOD HOLD COAG: NORMAL
WHOLE BLOOD HOLD SPECIMEN: NORMAL

## 2024-06-24 PROCEDURE — 93005 ELECTROCARDIOGRAM TRACING: CPT | Performed by: EMERGENCY MEDICINE

## 2024-06-24 PROCEDURE — 84484 ASSAY OF TROPONIN QUANT: CPT

## 2024-06-24 PROCEDURE — 93005 ELECTROCARDIOGRAM TRACING: CPT

## 2024-06-24 PROCEDURE — 36415 COLL VENOUS BLD VENIPUNCTURE: CPT

## 2024-06-24 PROCEDURE — 83735 ASSAY OF MAGNESIUM: CPT

## 2024-06-24 PROCEDURE — 83690 ASSAY OF LIPASE: CPT

## 2024-06-24 PROCEDURE — 80053 COMPREHEN METABOLIC PANEL: CPT

## 2024-06-24 PROCEDURE — 85025 COMPLETE CBC W/AUTO DIFF WBC: CPT

## 2024-06-24 PROCEDURE — 71045 X-RAY EXAM CHEST 1 VIEW: CPT

## 2024-06-24 PROCEDURE — 83880 ASSAY OF NATRIURETIC PEPTIDE: CPT

## 2024-06-24 PROCEDURE — 99284 EMERGENCY DEPT VISIT MOD MDM: CPT

## 2024-06-24 PROCEDURE — 84484 ASSAY OF TROPONIN QUANT: CPT | Performed by: EMERGENCY MEDICINE

## 2024-06-24 RX ORDER — CLINDAMYCIN HYDROCHLORIDE 300 MG/1
300 CAPSULE ORAL EVERY 6 HOURS
Qty: 28 CAPSULE | Refills: 0 | Status: SHIPPED | OUTPATIENT
Start: 2024-06-24 | End: 2024-07-01

## 2024-06-24 RX ORDER — ASPIRIN 81 MG/1
324 TABLET, CHEWABLE ORAL ONCE
Status: COMPLETED | OUTPATIENT
Start: 2024-06-24 | End: 2024-06-24

## 2024-06-24 RX ORDER — HYDROCODONE BITARTRATE AND ACETAMINOPHEN 5; 325 MG/1; MG/1
1 TABLET ORAL EVERY 6 HOURS PRN
Qty: 12 TABLET | Refills: 0 | Status: SHIPPED | OUTPATIENT
Start: 2024-06-24

## 2024-06-24 RX ORDER — SODIUM CHLORIDE 0.9 % (FLUSH) 0.9 %
10 SYRINGE (ML) INJECTION AS NEEDED
Status: DISCONTINUED | OUTPATIENT
Start: 2024-06-24 | End: 2024-06-25 | Stop reason: HOSPADM

## 2024-06-24 RX ADMIN — CLINDAMYCIN HYDROCHLORIDE 450 MG: 300 CAPSULE ORAL at 23:57

## 2024-06-24 RX ADMIN — BENZOCAINE 1 APPLICATION: 200 GEL DENTAL; ORAL; PERIODONTAL at 23:01

## 2024-06-24 RX ADMIN — ASPIRIN 324 MG: 81 TABLET, CHEWABLE ORAL at 20:40

## 2024-06-24 NOTE — ED TRIAGE NOTES
PT PRESENTS TO ED WITH LEFT LOWER DENTAL PAIN THAT STARTED THIS MORNING AND IS NOW HAVING LEFT ANTERIOR CP THAT RADIATES DOWN LEFT ARM AND SOA.

## 2024-06-24 NOTE — Clinical Note
[facility] EMERGENCY ROOM  [address] [place] [address]  Phone: [phone]  Fax: [phone]    Vita Cuevas was seen and treated in our emergency department on 6/24/2024.  She may return to work on 06/26/2024.         Thank you for choosing [facility].    Dm Pino MD

## 2024-06-25 NOTE — ED PROVIDER NOTES
Time: 11:39 PM EDT  Date of encounter:  2024  Independent Historian/Clinical History and Information was obtained by:   Patient    History is limited by: N/A    Chief Complaint: dental pain      History of Present Illness:  Patient is a 58 y.o. year old female who presents to the emergency department for evaluation of Dental pain and swelling.  This has been getting progressively worse over the last few days.  She also has some mild chest discomfort.  No fever or chills.  No other complaints.    HPI    Patient Care Team  Primary Care Provider: Thierno Li MD    Past Medical History:     No Known Allergies  Past Medical History:   Diagnosis Date    Anxiety     Calculus of gallbladder 2023    Endometriosis     Hypertension     Kidney stone     Other insomnia     PTSD (post-traumatic stress disorder)      Past Surgical History:   Procedure Laterality Date     SECTION      x3    CHOLECYSTECTOMY N/A 2023    Procedure: CHOLECYSTECTOMY LAPAROSCOPIC, INTRAOPERATIVE CHOLANGIOGRAM;  Surgeon: Marblela Razo MD;  Location: [facility] MAIN OR;  Service: General;  Laterality: N/A;    ERCP N/A 2023    Procedure: ENDOSCOPIC RETROGRADE CHOLANGIOPANCREATOGRAPHY WITH BALLOON SWEEP, SPHINCTEROTOMY AND STENT PLACEMENT;  Surgeon: Rush Lee MD;  Location: [facility] ENDOSCOPY;  Service: Gastroenterology;  Laterality: N/A;  BILE DUCT STONE    ERCP N/A 2023    Procedure: ENDOSCOPIC RETROGRADE CHOLANGIOPANCREATOGRAPHY WITH STENT REMOVAL, BALLOON SWEEP AND BRUSHINGS;  Surgeon: Rush Lee MD;  Location: [facility] ENDOSCOPY;  Service: Gastroenterology;  Laterality: N/A;  DISTAL BILE DUCT STRICTURE    KIDNEY STONE SURGERY      OTHER SURGICAL HISTORY      Endometriosis     Family History   Adopted: Yes   Family history unknown: Yes       Home Medications:  Prior to Admission medications    Medication Sig Start Date End Date Taking? Authorizing Provider   albuterol sulfate  (90  Base) MCG/ACT inhaler Inhale 2 puffs Every 4 (Four) Hours As Needed for Wheezing or Shortness of Air. 3/10/23   Thierno Li MD   amLODIPine-benazepril (Lotrel) 10-40 MG per capsule Take 1 capsule by mouth Daily. 6/20/24   Thierno Li MD   chlorthalidone (HYGROTON) 25 MG tablet TAKE 1 TABLET BY MOUTH EVERY DAY 5/7/24   Thierno Li MD   clindamycin (CLEOCIN) 300 MG capsule Take 1 capsule by mouth Every 6 (Six) Hours for 7 days. 6/24/24 7/1/24  Dm Pino MD   gabapentin (NEURONTIN) 300 MG capsule Take 1 capsule by mouth 4 (Four) Times a Day. 2/21/24   Thierno Li MD   HYDROcodone-acetaminophen (NORCO) 5-325 MG per tablet Take 1 tablet by mouth Every 6 (Six) Hours As Needed for Moderate Pain. 6/24/24   Dm Pino MD   traZODone (DESYREL) 50 MG tablet TAKE 1 TABLET BY MOUTH EVERY NIGHT 6/7/24   Thierno Li MD   cloNIDine (CATAPRES) 0.1 MG tablet TAKE 1 TABLET BY MOUTH TWICE DAILY AS NEEDED FOR ANXIETY. BE SLOW TO RISE FROM BEDTIME/CHAIR 2/20/24 6/24/24  Chantal Munoz MD   LORazepam (Ativan) 0.5 MG tablet Take one 0.5 mg tablet daily as needed for panic/anxiety that is severe. USE SPARINGLY #10 for 30 days 2/26/24 6/24/24  Javier Wilkerson MD   nicotine (NICODERM CQ) 7 MG/24HR patch PLACE 1 PATCH ON THE SKIN AS DIRECTED BY PROVIDER DAILY. DO NOT START THE 7 MG UNTIL FINISHING THE ENTIRE COURSE OF 14 MG PATCHES 2/24/24 6/24/24  Chantal Munoz MD   venlafaxine XR (Effexor XR) 37.5 MG 24 hr capsule Take 1 capsule by mouth Daily for 90 days. 2/20/24 6/24/24  Livia Gallego APRN        Social History:   Social History     Tobacco Use    Smoking status: Every Day     Current packs/day: 0.50     Average packs/day: 0.3 packs/day for 37.5 years (9.5 ttl pk-yrs)     Types: Cigarettes     Start date: 2024     Passive exposure: Current    Smokeless tobacco: Never    Tobacco comments:     1-2 CIGS PER DAY   Vaping Use    Vaping status: Never Used   Substance  "Use Topics    Alcohol use: Not Currently    Drug use: Never         Review of Systems:  Review of Systems   Constitutional:  Negative for chills and fever.   HENT:  Positive for dental problem. Negative for congestion, ear pain and sore throat.    Eyes:  Negative for pain.   Respiratory:  Negative for cough, chest tightness and shortness of breath.    Cardiovascular:  Positive for chest pain.   Gastrointestinal:  Negative for abdominal pain, diarrhea, nausea and vomiting.   Genitourinary:  Negative for flank pain and hematuria.   Musculoskeletal:  Negative for joint swelling.   Skin:  Negative for pallor.   Neurological:  Negative for seizures and headaches.   All other systems reviewed and are negative.       Physical Exam:  /57 (BP Location: Left arm, Patient Position: Sitting)   Pulse 76   Temp 98 °F (36.7 °C) (Oral)   Resp 14   Ht 162.6 cm (64\")   Wt 93.5 kg (206 lb 2.1 oz)   SpO2 96%   BMI 35.38 kg/m²     Physical Exam  Constitutional:       Appearance: Normal appearance.   HENT:      Head: Normocephalic and atraumatic.      Nose: Nose normal.      Mouth/Throat:      Mouth: Mucous membranes are moist.      Dentition: Dental caries and dental abscesses (left lower) present.      Pharynx: Oropharynx is clear. Uvula midline.   Eyes:      Extraocular Movements: Extraocular movements intact.      Conjunctiva/sclera: Conjunctivae normal.      Pupils: Pupils are equal, round, and reactive to light.   Cardiovascular:      Rate and Rhythm: Normal rate and regular rhythm.      Pulses: Normal pulses.      Heart sounds: Normal heart sounds.   Pulmonary:      Effort: Pulmonary effort is normal.      Breath sounds: Normal breath sounds.   Abdominal:      General: There is no distension.      Palpations: Abdomen is soft.      Tenderness: There is no abdominal tenderness.   Musculoskeletal:         General: Normal range of motion.      Cervical back: Normal range of motion.   Skin:     General: Skin is warm and " dry.      Capillary Refill: Capillary refill takes less than 2 seconds.   Neurological:      General: No focal deficit present.      Mental Status: She is alert and oriented to person, place, and time. Mental status is at baseline.   Psychiatric:         Mood and Affect: Mood normal.         Behavior: Behavior normal.                  Procedures:  Procedures      Medical Decision Making:      Comorbidities that affect care:    Anxiety, Hypertension    External Notes reviewed:    Previous Clinic Note: Pt seen by behavioral health on 2/20/2024 for panic attacks, depression, mood disorder, PTSD.      The following orders were placed and all results were independently analyzed by me:  Orders Placed This Encounter   Procedures    XR Chest 1 View    Sebec Draw    High Sensitivity Troponin T    Comprehensive Metabolic Panel    Lipase    BNP    Magnesium    CBC Auto Differential    High Sensitivity Troponin T 2Hr    NPO Diet NPO Type: Strict NPO    Undress & Gown    Continuous Pulse Oximetry    Oxygen Therapy- Nasal Cannula; Titrate 1-6 LPM Per SpO2; 90 - 95%    Insert Peripheral IV    CBC & Differential    Green Top (Gel)    Lavender Top    Gold Top - SST    Light Blue Top       Medications Given in the Emergency Department:  Medications   sodium chloride 0.9 % flush 10 mL (has no administration in time range)   benzocaine (HURRICAINE/ORAJEL) 20 % jelly (1 Application Mouth/Throat Given 6/24/24 2301)   clindamycin (CLEOCIN) capsule 450 mg (has no administration in time range)   aspirin chewable tablet 324 mg (324 mg Oral Given 6/24/24 2040)        ED Course:    ED Course as of 06/24/24 2346   Mon Jun 24, 2024   2344 ECG 12 Lead ED Triage Standing Order; Chest Pain  Normal sinus rhythm with rate of 87. QRS normal. WA interval normal. QTc interval is normal. No ST elevation or depression. No T wave abnormalities. This EKG was interpreted by me.    [LD]      ED Course User Index  [LD] Dm Pino MD        Labs:    Lab Results (last 24 hours)       Procedure Component Value Units Date/Time    High Sensitivity Troponin T [[id number]]  (Normal) Collected: 06/24/24 1941    Specimen: Blood from Arm, Right Updated: 06/24/24 2023     HS Troponin T 7 ng/L     Narrative:      High Sensitive Troponin T Reference Range:  <14.0 ng/L- Negative Female for AMI  <22.0 ng/L- Negative Male for AMI  >=14 - Abnormal Female indicating possible myocardial injury.  >=22 - Abnormal Male indicating possible myocardial injury.   Clinicians would have to utilize clinical acumen, EKG, Troponin, and serial changes to determine if it is an Acute Myocardial Infarction or myocardial injury due to an underlying chronic condition.         CBC & Differential [[id number]]  (Abnormal) Collected: 06/24/24 1941    Specimen: Blood from Arm, Right Updated: 06/24/24 1955    Narrative:      The following orders were created for panel order CBC & Differential.  Procedure                               Abnormality         Status                     ---------                               -----------         ------                     CBC Auto Differential[[id number]]        Abnormal            Final result                 Please view results for these tests on the individual orders.    Comprehensive Metabolic Panel [[id number]]  (Abnormal) Collected: 06/24/24 1941    Specimen: Blood from Arm, Right Updated: 06/24/24 2023     Glucose 102 mg/dL      BUN 15 mg/dL      Creatinine 0.86 mg/dL      Sodium 141 mmol/L      Potassium 3.6 mmol/L      Chloride 104 mmol/L      CO2 25.2 mmol/L      Calcium 8.8 mg/dL      Total Protein 6.6 g/dL      Albumin 3.9 g/dL      ALT (SGPT) 31 U/L      AST (SGOT) 32 U/L      Alkaline Phosphatase 92 U/L      Total Bilirubin 0.2 mg/dL      Globulin 2.7 gm/dL      A/G Ratio 1.4 g/dL      BUN/Creatinine Ratio 17.4     Anion Gap 11.8 mmol/L      eGFR 78.9 mL/min/1.73     Narrative:      GFR Normal >60  Chronic Kidney Disease <60  Kidney  Failure <15      Lipase [[id number]]  (Normal) Collected: 06/24/24 1941    Specimen: Blood from Arm, Right Updated: 06/24/24 2023     Lipase 23 U/L     BNP [[id number]]  (Normal) Collected: 06/24/24 1941    Specimen: Blood from Arm, Right Updated: 06/24/24 2017     proBNP 438.9 pg/mL     Narrative:      This assay is used as an aid in the diagnosis of individuals suspected of having heart failure. It can be used as an aid in the diagnosis of acute decompensated heart failure (ADHF) in patients presenting with signs and symptoms of ADHF to the emergency department (ED). In addition, NT-proBNP of <300 pg/mL indicates ADHF is not likely.    Age Range Result Interpretation  NT-proBNP Concentration (pg/mL:      <50             Positive            >450                   Gray                 300-450                    Negative             <300    50-75           Positive            >900                  Gray                300-900                  Negative            <300      >75             Positive            >1800                  Gray                300-1800                  Negative            <300    Magnesium [[id number]]  (Normal) Collected: 06/24/24 1941    Specimen: Blood from Arm, Right Updated: 06/24/24 2023     Magnesium 1.7 mg/dL     CBC Auto Differential [[id number]]  (Abnormal) Collected: 06/24/24 1941    Specimen: Blood from Arm, Right Updated: 06/24/24 1955     WBC 10.16 10*3/mm3      RBC 3.93 10*6/mm3      Hemoglobin 11.9 g/dL      Hematocrit 35.3 %      MCV 89.8 fL      MCH 30.3 pg      MCHC 33.7 g/dL      RDW 13.9 %      RDW-SD 45.0 fl      MPV 11.3 fL      Platelets 205 10*3/mm3      Neutrophil % 57.1 %      Lymphocyte % 33.1 %      Monocyte % 7.7 %      Eosinophil % 1.1 %      Basophil % 0.7 %      Immature Grans % 0.3 %      Neutrophils, Absolute 5.81 10*3/mm3      Lymphocytes, Absolute 3.36 10*3/mm3      Monocytes, Absolute 0.78 10*3/mm3      Eosinophils, Absolute 0.11 10*3/mm3      Basophils,  Absolute 0.07 10*3/mm3      Immature Grans, Absolute 0.03 10*3/mm3      nRBC 0.0 /100 WBC     High Sensitivity Troponin T 2Hr [[id number]]  (Normal) Collected: 06/24/24 2145    Specimen: Blood from Arm, Left Updated: 06/24/24 2207     HS Troponin T 7 ng/L      Troponin T Delta 0 ng/L     Narrative:      High Sensitive Troponin T Reference Range:  <14.0 ng/L- Negative Female for AMI  <22.0 ng/L- Negative Male for AMI  >=14 - Abnormal Female indicating possible myocardial injury.  >=22 - Abnormal Male indicating possible myocardial injury.   Clinicians would have to utilize clinical acumen, EKG, Troponin, and serial changes to determine if it is an Acute Myocardial Infarction or myocardial injury due to an underlying chronic condition.                  Imaging:    XR Chest 1 View    Result Date: 6/24/2024  XR CHEST 1 VW Date of Exam: 6/24/2024 7:43 PM EDT Indication: Chest Pain Triage Protocol left tooth pain radiating down left neck and into chest Comparison: Single view chest radiograph from 7/17/2023 Findings: The lungs are clear. Cardiac, hilar, and mediastinal silhouettes are within normal limits. No pneumothorax or pleural effusions. The trachea is midline. Pulmonary vascularity is normal. Visualized bony structures are intact.     Impression: No active cardiopulmonary disease. Electronically Signed: Garo Shankar DO  6/24/2024 7:56 PM EDT  Workstation ID: [id number]       Differential Diagnosis and Discussion:    Chest Pain:  Based on the patient's signs and symptoms, I considered aortic dissection, myocardial infaction, pulmonary embolism, cardiac tamponade, pericarditis, pneumothorax, musculoskeletal chest pain and other differential diagnosis as an etiology of the patient's chest pain.   Dental Pain: Differential diagnosis includes but is not limited to dental caries, periodontitis, pericoronitis, peridental abscess, gingival abscess, apthous stomatitis, allergic stomatitis, acute necrotizing ulcerative  gingivitis, herpetic stomatitis.    All labs were reviewed and interpreted by me.  All X-rays impressions were independently interpreted by me.  EKG was interpreted by me.    MDM  Number of Diagnoses or Management Options  Dental abscess  Diagnosis management comments: On arrival patient is afebrile nontoxic-appearing.  Vital signs stable.  Patient has a fluctuating area to left lower jaw concerning for abscess.  This was drained in the emergency department.  Patient also reported chest pain.  EKG shows sinus rhythm no acute ST elevation.  Troponin was negative.  Labs did not show any other significant abnormality.  Recommend close follow-up with a dentist.  Patient given dose of antibiotics.  Also sent prescription for pain medication.  Discussed return precautions, discharge instructions and answered all questions.       Amount and/or Complexity of Data Reviewed  Clinical lab tests: reviewed  Tests in the radiology section of CPT®: reviewed  Review and summarize past medical records: yes  Independent visualization of images, tracings, or specimens: yes    Risk of Complications, Morbidity, and/or Mortality  Presenting problems: moderate  Management options: moderate                 Patient Care Considerations:    SEPSIS was considered but is NOT present in the emergency department as SIRS criteria is not present.      Consultants/Shared Management Plan:    None    Social Determinants of Health:    Patient is independent, reliable, and has access to care.       Disposition and Care Coordination:    Discharged: The patient is suitable and stable for discharge with no need for consideration of admission.    I have explained the patient´s condition, diagnoses and treatment plan based on the information available to me at this time. I have answered questions and addressed any concerns. The patient has a good  understanding of the patient´s diagnosis, condition, and treatment plan as can be expected at this point. The  vital signs have been stable. The patient´s condition is stable and appropriate for discharge from the emergency department.      The patient will pursue further outpatient evaluation with the primary care physician or other designated or consulting physician as outlined in the discharge instructions. They are agreeable to this plan of care and follow-up instructions have been explained in detail. The patient has received these instructions in written format and has expressed an understanding of the discharge instructions. The patient is aware that any significant change in condition or worsening of symptoms should prompt an immediate return to this or the closest emergency department or call to 1.  I have explained discharge medications and the need for follow up with the patient/caretakers. This was also printed in the discharge instructions. Patient was discharged with the following medications and follow up:      Medication List        New Prescriptions      clindamycin 300 MG capsule  Commonly known as: CLEOCIN  Take 1 capsule by mouth Every 6 (Six) Hours for 7 days.     HYDROcodone-acetaminophen 5-325 MG per tablet  Commonly known as: NORCO  Take 1 tablet by mouth Every 6 (Six) Hours As Needed for Moderate Pain.               Where to Get Your Medications        These medications were sent to [id number] - [place], [place] - [address] AT [facility], [facility] [phone]  - [phone] FX  [address] [place] [address]      Phone: [phone]   clindamycin 300 MG capsule  HYDROcodone-acetaminophen 5-325 MG per tablet      Thierno Li MD  [address] [facility]  [address]  [facility] [address]  [phone]    In 2 days      Bernarda Rico, QUIRINO  [address] [facility] [address]  [phone]    Schedule an appointment as soon as possible for a visit          Final diagnoses:   Dental abscess        ED Disposition       ED Disposition   Discharge     Condition   Stable    Comment   --               This medical record created using voice recognition software.             Dm Pino MD  06/24/24 3815

## 2024-06-26 ENCOUNTER — TELEPHONE (OUTPATIENT)
Dept: INTERNAL MEDICINE | Facility: CLINIC | Age: 59
End: 2024-06-26
Payer: COMMERCIAL

## 2024-06-26 NOTE — TELEPHONE ENCOUNTER
Caller: Vita Cuevas    Relationship to patient: Self    Best call back number: [phone]     Chief complaint: MEDICATION REVIEW, SWELLING LEGS/FEET    Type of visit: OFFICE VISIT    Requested date: 7/1/24-7/5/24    If rescheduling, when is the original appointment: 7/19/24    Additional notes: PATIENT WOULD LIKE TO BE SEEN SOONER AS SHE IS CONCERNED WITH THE SWELLING IN HER LEGS AND FEET

## 2024-07-02 NOTE — TELEPHONE ENCOUNTER
Patient has been contacted and appointment has been moved up.    No further questions or concerns noted.

## 2024-07-03 ENCOUNTER — LAB (OUTPATIENT)
Dept: LAB | Facility: HOSPITAL | Age: 59
End: 2024-07-03
Payer: COMMERCIAL

## 2024-07-03 ENCOUNTER — TELEPHONE (OUTPATIENT)
Dept: PSYCHIATRY | Facility: CLINIC | Age: 59
End: 2024-07-03

## 2024-07-03 ENCOUNTER — OFFICE VISIT (OUTPATIENT)
Dept: PSYCHIATRY | Facility: CLINIC | Age: 59
End: 2024-07-03
Payer: COMMERCIAL

## 2024-07-03 VITALS
BODY MASS INDEX: 33.39 KG/M2 | HEIGHT: 64 IN | HEART RATE: 78 BPM | SYSTOLIC BLOOD PRESSURE: 134 MMHG | WEIGHT: 195.6 LBS | DIASTOLIC BLOOD PRESSURE: 77 MMHG

## 2024-07-03 DIAGNOSIS — F41.0 PANIC ATTACKS: ICD-10-CM

## 2024-07-03 DIAGNOSIS — F41.0 PANIC ATTACKS: Primary | ICD-10-CM

## 2024-07-03 DIAGNOSIS — R41.840 ATTENTION AND CONCENTRATION DEFICIT: ICD-10-CM

## 2024-07-03 DIAGNOSIS — F32.A MOOD DISORDER OF DEPRESSED TYPE: ICD-10-CM

## 2024-07-03 DIAGNOSIS — F40.10 SOCIAL ANXIETY DISORDER: ICD-10-CM

## 2024-07-03 DIAGNOSIS — F32.0 CURRENT MILD EPISODE OF MAJOR DEPRESSIVE DISORDER WITHOUT PRIOR EPISODE: ICD-10-CM

## 2024-07-03 DIAGNOSIS — F43.10 POST TRAUMATIC STRESS DISORDER (PTSD): ICD-10-CM

## 2024-07-03 PROCEDURE — 80307 DRUG TEST PRSMV CHEM ANLYZR: CPT

## 2024-07-03 PROCEDURE — 99214 OFFICE O/P EST MOD 30 MIN: CPT

## 2024-07-03 NOTE — PROGRESS NOTES
"[facility]  Follow-up Visit    Chief Complaint: \"I am having the worst anxiety\"      Initial History: Vita Cuevas is a vivacious 58 y.o. female who presents today for initial evaluation regarding anxiety, and panic attacks.  Ms. Cuevas presents unaccompanied in no anxious distress and engages with me appropriately. Psychotropic regimen with which patient presents is described as nothing.      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  She believes that her anxiety, brain fog, and worsening anxiety is related to menopause. Psychiatric screening is negative for pathognomonic history of: TBI, mila, psychosis, violence, and suicidality.   Recently, patient complains of worsening panic that is affecting her work.  She stated that she struggled over at her oldest son's during Pop.Recurrent episodes of intense, unprovoked anxiety with chest pain, feelings of doom, dizziness, avoidance 2/2 fear of recurrence of panic.  Patient screened for mila, there is a familial history of bipolar disorder(son), and bipolar disorder can sometime manifest in menopausal women, regalado, patient did not meet DSM-V criteria for bipolar affective disorder.  Her history led me to believe that there is a obsessive-compulsive component that she is dealing with.  She did not meet criteria for obsessive compulsive disorder per the DSM-V.  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 Patient stated multiple times that she does not like to take any medicine, so I was mindful of that, but wanted to educate patient on all options like benzodiazepines, buspirone, and hydroxyzine. After patient education, patient agreed to trial clonidine 0.1 mg po BID prn for panic attacks. R/B/E reviewed. I will begin clonidine for panic attacks; I have advised this agent has propensity to diminish blood pressure and may result in dizziness, sedation, xerostomia. Patient instructed to be mindful of orthostatic hypotension. Encouraged patient to obtain home BP cuff.      I have counseled the patient with regard to diagnoses and the recommended treatment regimen as documented below: I will assume prescriptive responsibility for clonidine 0.1 mg po twice daily prn, anxiety. Patient acknowledges the diagnoses per my rendered interpretation. Patient demonstrates awareness/understanding of viable alternatives for treatment as well as potential risks, benefits, and side effects associated with this regimen and is amenable to proceed in this fashion.        Interval History Vita is a 58 y.o. female who presents today for follow-up.  Vita presents unaccompanied in no acute distress and engages with me appropriately. LAST SEEN 2/20/2024.  Vita believes that her current regimen is working well for sleep, PCP prescribed trazodone 50 mg nightly, started .  Patient informed me that she took herself off of clonidine as needed for anxiety and panic as well as venlafaxine, because she did not have patience to see if it would help. She admits to trying bupropion in the past but only took for week. She says \"I did not want to be on something that makes me a zombie.\"   We discussed her anxiety and how it affects her daily life, she is able to go to work but is struggling to go to family functions, appointments, and psychotherapy.  Screening for bipolar disorder and symptoms of mila or hypomania, Vita denies  having " "symptoms of racing thoughts, abnormally upbeat/wired, exaggerated sense of well-being and self-confidence, unusual talkativeness, poor decision making, decreased need for sleep. Increased activity, energy, or agitation. She endorses having low energy, and motivation stemming from feelings of overwhelm related to the following symptoms trouble concentrating, trouble completing tasks, making errors in routine tasks, issues remembering obligations, trouble with organization, fidgeting, and associated irritability/anxiety with these deficits.   Current treatment regimen includes:   - trazodone 50 mg po q HS, prescribed by PCP  Side-effects per given history: sleeping well.      Today the patient feels frustrated. She wants to feel better but is unable to push through her anxiety. She is keen to try something for symptoms of ADHD. The patient denies SI/HI/AVH. There are not changes on exam today compared to most recent evaluation.  She admits to poor compliance with appointments, and medications.  She states that she is unable to be leave the house due to anxiety and fear. 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 Screening for ADHD, and cautioned patient that stimulants are NOT ideal for use when a patient has a history panic attacks, and MISSAEL. The goal of utilizing stimulant would be to reduce symptoms of anxiety by aiding in reduction of the following symptoms, trouble concentrating, trouble completing tasks, making errors in routine tasks, issues remembering obligations, trouble with organization, fidgeting, and associated irritability/anxiety with these deficits. We discussed utilizing non-stimulants such as atomoxetine, Wellbutrin (off label for ADHD sx) and Qelbree, and also using propranolol or clonidine (off label)  for the reduction of anxiety.  She stated again that \"she did not want to take anything " "that made her a zombie.\" Patient was keen to not discuss these medications at this time.   Thought process and content are devoid of overt aberration suggestive of acute mila/psychosis.   - sleep: no concerns  - appetite: moderately controlled    I have counseled the patient with regard to diagnoses and the recommended treatment regimen as documented below. We will trial an ADHD stimulant for attention deficit and hyperactivity disorder, With regard to ADHD: I am presumptively treating patient for this issue; history as provided today, presentation today, and positive family history would suggest a distinct possibility this is a contributing factor to patient's dysfunction in life roles and engagements irrespective to screening results. Patient has learned and successfully implemented compensatory coping skills that, with the Druze of layered stressors throughout adulthood, have begun to fail. Treating ADHD symptoms will likely be a concise and appropriate route to address anxiety and mood issues that are, at least to some degree, secondary to deficits related to traits of ADHD.     I will be prescribing ADHD symptoms-Adderall  IR 5 mg po q day, before shift. Patient has been made aware of the long-term risks of tachyphylaxis/dependence and uncomfortable withdrawal that may occur with abrupt discontinuation of this agent. I have advised taking medication holidays to mitigate risk of dependence and tolerance and have advised the patient with regard to common psychological dependence that can contribute to perceived diminishment in treatment effectiveness. Patient has been advised to monitor and limit caffeine intake while taking this agent. Patient has been made aware of common SE - diminished appetite, insomnia, hypertension - for which this agent has propensity. I have specifically reviewed issues related to misuse and diversion with the patient today. Patient acknowledges the diagnoses per my rendered " interpretation. Patient demonstrates understanding of potential risks/benefits/side effects associated with this regimen and is amenable to proceed in this fashion.   Note the patient verbally agrees to follow [facility] controlled substance agreement with regular and potentially irregular urine drug screens tests based on provider's discretion and in person visits at least every 3 months per provider's discretion.  Patient is aware that this prescription usage is on a trial basis.  Patient has also verbally agreed to follow through with trauma based psychotherapy as referral was made 2/26/2024.  Hope states she meets with Kaitlynn Guerra of 7/[facility].   Assignment: begin journaling instances of overwhelm, response thereto, ideal response to cultivate insight and begin breaking a pattern of stimulus/response.    Psychotherapy  - Time: 15 minutes  minutes  - interventions employed: the therapeutic alliance was strengthened to encourage the patient to express their thoughts and feelings freely. Esteem building was enhanced through praise, reassurance, normalizing/challenging, and encouragement as appropriate. General coping skills were enhanced to build distress tolerance skills and emotional regulation. Allowed patient to freely discuss issues without interruption or judgement with unconditional positive regard, active listening skills, and empathy. Provided a safe, confidential environment to facilitate the development of a positive therapeutic relationship and encourage open, honest communication. Assisted patient in identifying risk factors which would indicate the need for higher level of care including thoughts to harm self or others. Assisted patient in processing session content; acknowledged and normalized/addressed, as appropriate, patient’s thoughts, feelings, and concerns by utilizing a person-centered approach in efforts to build appropriate rapport and a positive therapeutic relationship.   -  Diagnoses: see assessment and plan below  - Symptoms: see subjective above  - Goals-encourage and challenge Hope to meet required and recommended medical appointments in the upcoming months   - challenge patterns of living contributing to symptom burden, strengthen defenses, promote problem solving skills, restore adaptive functioning, and provide symptom relief.  - Treatment plan: continue supportive psychotherapy in subsequent appointments to provide symptom relief; see assessment and plan below for additional details    Social History     Socioeconomic History    Marital status: Single    Number of children: 3    Years of education: 12+    Highest education level: Some college, no degree   Tobacco Use    Smoking status: Every Day     Current packs/day: 0.50     Average packs/day: 0.3 packs/day for 37.5 years (9.5 ttl pk-yrs)     Types: Cigarettes     Start date: 2024     Passive exposure: Current    Smokeless tobacco: Never    Tobacco comments:     1-2 CIGS PER DAY   Vaping Use    Vaping status: Never Used   Substance and Sexual Activity    Alcohol use: Not Currently    Drug use: Never    Sexual activity: Not Currently     Partners: Male     Birth control/protection: Abstinence, Tubal ligation, Post-menopausal, None       Tobacco use counseling/intervention: Patient smokes on occasion  Currently Precontemplation by transtheoretical model.     Problem List:  Patient Active Problem List   Diagnosis    Hypokalemia    Annual physical exam    Calculus of gallbladder    Acute cholecystitis    Choledocholithiasis with acute cholecystitis    S/P laparoscopic cholecystectomy     Allergy:   No Known Allergies     Discontinued Medications:  There are no discontinued medications.    Current Medications:   Current Outpatient Medications   Medication Sig Dispense Refill    albuterol sulfate  (90 Base) MCG/ACT inhaler Inhale 2 puffs Every 4 (Four) Hours As Needed for Wheezing or Shortness of Air. 18 g 2     amLODIPine-benazepril (Lotrel) 10-40 MG per capsule Take 1 capsule by mouth Daily. 90 capsule 2    chlorthalidone (HYGROTON) 25 MG tablet TAKE 1 TABLET BY MOUTH EVERY DAY 90 tablet 2    gabapentin (NEURONTIN) 300 MG capsule Take 1 capsule by mouth 4 (Four) Times a Day. 120 capsule 2    HYDROcodone-acetaminophen (NORCO) 5-325 MG per tablet Take 1 tablet by mouth Every 6 (Six) Hours As Needed for Moderate Pain. 12 tablet 0    traZODone (DESYREL) 50 MG tablet TAKE 1 TABLET BY MOUTH EVERY NIGHT 90 tablet 1     No current facility-administered medications for this visit.     Past Medical History:  Past Medical History:   Diagnosis Date    Anxiety     Calculus of gallbladder 2023    Endometriosis     Hypertension     Kidney stone     Other insomnia     PTSD (post-traumatic stress disorder)      Past Surgical History:  Past Surgical History:   Procedure Laterality Date     SECTION      x3    CHOLECYSTECTOMY N/A 2023    Procedure: CHOLECYSTECTOMY LAPAROSCOPIC, INTRAOPERATIVE CHOLANGIOGRAM;  Surgeon: Marbella Razo MD;  Location: [facility] MAIN OR;  Service: General;  Laterality: N/A;    ERCP N/A 2023    Procedure: ENDOSCOPIC RETROGRADE CHOLANGIOPANCREATOGRAPHY WITH BALLOON SWEEP, SPHINCTEROTOMY AND STENT PLACEMENT;  Surgeon: Rush Lee MD;  Location: [facility] ENDOSCOPY;  Service: Gastroenterology;  Laterality: N/A;  BILE DUCT STONE    ERCP N/A 2023    Procedure: ENDOSCOPIC RETROGRADE CHOLANGIOPANCREATOGRAPHY WITH STENT REMOVAL, BALLOON SWEEP AND BRUSHINGS;  Surgeon: Rush Lee MD;  Location: [facility] ENDOSCOPY;  Service: Gastroenterology;  Laterality: N/A;  DISTAL BILE DUCT STRICTURE    KIDNEY STONE SURGERY      OTHER SURGICAL HISTORY      Endometriosis       MENTAL STATUS EXAM   General Appearance:  Cleanly groomed and dressed and well developed  Eye Contact:  Good eye contact  Attitude:  Cooperative and polite  Motor Activity:  Fidgety and restless  Muscle Strength:   "Normal  Speech:  Normal rate, tone, volume  Language:  Spontaneous  Mood and affect:  Normal, pleasant  Hopelessness:  Denies  Loneliness: Denies  Thought Process:  Logical and goal-directed  Associations/ Thought Content:  No delusions  Hallucinations:  None and olfactory  Suicidal Ideations:  Not present  Homicidal Ideation:  Not present  Sensorium:  Alert and clear  Orientation:  Person, place, time and situation  Immediate Recall, Recent, and Remote Memory:  Intact  Attention Span/ Concentration:  Good  Fund of Knowledge:  Appropriate for age and educational level  Intellectual Functioning:  Above average  Insight:  Fair  Judgement:  Fair  Reliability:  Good  Impulse Control:  Good      Vital Signs:   /77   Pulse 78   Ht 162.6 cm (64.02\")   Wt 88.7 kg (195 lb 9.6 oz)   BMI 33.56 kg/m²    Lab Results:   Admission on 06/24/2024, Discharged on 06/24/2024   Component Date Value Ref Range Status    QT Interval 06/24/2024 362  ms Preliminary    QTC Interval 06/24/2024 436  ms Preliminary    HS Troponin T 06/24/2024 7  <14 ng/L Final    Glucose 06/24/2024 102 (H)  65 - 99 mg/dL Final    BUN 06/24/2024 15  6 - 20 mg/dL Final    Creatinine 06/24/2024 0.86  0.57 - 1.00 mg/dL Final    Sodium 06/24/2024 141  136 - 145 mmol/L Final    Potassium 06/24/2024 3.6  3.5 - 5.2 mmol/L Final    Chloride 06/24/2024 104  98 - 107 mmol/L Final    CO2 06/24/2024 25.2  22.0 - 29.0 mmol/L Final    Calcium 06/24/2024 8.8  8.6 - 10.5 mg/dL Final    Total Protein 06/24/2024 6.6  6.0 - 8.5 g/dL Final    Albumin 06/24/2024 3.9  3.5 - 5.2 g/dL Final    ALT (SGPT) 06/24/2024 31  1 - 33 U/L Final    AST (SGOT) 06/24/2024 32  1 - 32 U/L Final    Alkaline Phosphatase 06/24/2024 92  39 - 117 U/L Final    Total Bilirubin 06/24/2024 0.2  0.0 - 1.2 mg/dL Final    Globulin 06/24/2024 2.7  gm/dL Final    A/G Ratio 06/24/2024 1.4  g/dL Final    BUN/Creatinine Ratio 06/24/2024 17.4  7.0 - 25.0 Final    Anion Gap 06/24/2024 11.8  5.0 - 15.0 mmol/L " Final    eGFR 06/24/2024 78.9  >60.0 mL/min/1.73 Final    Lipase 06/24/2024 23  13 - 60 U/L Final    proBNP 06/24/2024 438.9  0.0 - 900.0 pg/mL Final    Magnesium 06/24/2024 1.7  1.6 - 2.6 mg/dL Final    Extra Tube 06/24/2024 Hold for add-ons.   Final    Auto resulted.    Extra Tube 06/24/2024 hold for add-on   Final    Auto resulted    Extra Tube 06/24/2024 Hold for add-ons.   Final    Auto resulted.    Extra Tube 06/24/2024 Hold for add-ons.   Final    Auto resulted    WBC 06/24/2024 10.16  3.40 - 10.80 10*3/mm3 Final    RBC 06/24/2024 3.93  3.77 - 5.28 10*6/mm3 Final    Hemoglobin 06/24/2024 11.9 (L)  12.0 - 15.9 g/dL Final    Hematocrit 06/24/2024 35.3  34.0 - 46.6 % Final    MCV 06/24/2024 89.8  79.0 - 97.0 fL Final    MCH 06/24/2024 30.3  26.6 - 33.0 pg Final    MCHC 06/24/2024 33.7  31.5 - 35.7 g/dL Final    RDW 06/24/2024 13.9  12.3 - 15.4 % Final    RDW-SD 06/24/2024 45.0  37.0 - 54.0 fl Final    MPV 06/24/2024 11.3  6.0 - 12.0 fL Final    Platelets 06/24/2024 205  140 - 450 10*3/mm3 Final    Neutrophil % 06/24/2024 57.1  42.7 - 76.0 % Final    Lymphocyte % 06/24/2024 33.1  19.6 - 45.3 % Final    Monocyte % 06/24/2024 7.7  5.0 - 12.0 % Final    Eosinophil % 06/24/2024 1.1  0.3 - 6.2 % Final    Basophil % 06/24/2024 0.7  0.0 - 1.5 % Final    Immature Grans % 06/24/2024 0.3  0.0 - 0.5 % Final    Neutrophils, Absolute 06/24/2024 5.81  1.70 - 7.00 10*3/mm3 Final    Lymphocytes, Absolute 06/24/2024 3.36 (H)  0.70 - 3.10 10*3/mm3 Final    Monocytes, Absolute 06/24/2024 0.78  0.10 - 0.90 10*3/mm3 Final    Eosinophils, Absolute 06/24/2024 0.11  0.00 - 0.40 10*3/mm3 Final    Basophils, Absolute 06/24/2024 0.07  0.00 - 0.20 10*3/mm3 Final    Immature Grans, Absolute 06/24/2024 0.03  0.00 - 0.05 10*3/mm3 Final    nRBC 06/24/2024 0.0  0.0 - 0.2 /100 WBC Final    HS Troponin T 06/24/2024 7  <14 ng/L Final    Troponin T Delta 06/24/2024 0  >=-4 - <+4 ng/L Final   Lab on 02/22/2024   Component Date Value Ref Range Status     Amphet/Methamphet, Screen 02/22/2024 Negative  Negative Final    Barbiturates Screen, Urine 02/22/2024 Negative  Negative Final    Benzodiazepine Screen, Urine 02/22/2024 Negative  Negative Final    Cocaine Screen, Urine 02/22/2024 Negative  Negative Final    Opiate Screen 02/22/2024 Negative  Negative Final    THC, Screen, Urine 02/22/2024 Negative  Negative Final    Methadone Screen, Urine 02/22/2024 Negative  Negative Final    Oxycodone Screen, Urine 02/22/2024 Negative  Negative Final    Fentanyl, Urine 02/22/2024 Negative  Negative Final       ASSESSMENT AND PLAN:    ICD-10-CM ICD-9-CM   1. Panic attacks  F41.0 300.01   2. Post traumatic stress disorder (PTSD)  F43.10 309.81   3. Mood disorder of depressed type  F32.A 311   4. Current mild episode of major depressive disorder without prior episode  F32.0 296.21   5. Attention and concentration deficit  R41.840 799.51   6. Social anxiety disorder  F40.10 300.23       Vita is a 58 y.o. female who presents today for follow-up regarding medication management. We have discussed the interval history and the treatment plan below, including potential R/B/SE of the recommended regimen of which the patient demonstrates understanding. Patient is agreeable to call 911 or go to the nearest ER should she become concerned for her own safety and/or the safety of those around her. There are are no overt indices of acute mila/psychosis on exam today. DANIEL reviewed and is as expected.    Medication regimen: Upon receipt of UDS in Daniel report begin amphetamine/dextroamphetamine IR 5 mg p.o. to be taken prior to shift, continue trazodone 50 mg p.o. nightly as prescribed by PCP; patient is advised not to misuse prescribed medications or to use them with any exogenous substances that aren't disclosed to this provider as they may interact with the regimen to the patient's detriment.   Risk Assessment: protracted risk is moderate, imminent risk is moderate.  do note that this  is subject to change with the Mandaeism of new stressors, treatment non-adherence, use of substances, and/or new medical ails.   Monitoring: reviewed labs/imaging as populated above; ordered  Therapy: per Jayde Guerra  Follow-up: one month, in person  Communications: N/A    TREATMENT PLAN/GOALS: challenge patterns of living conducive to symptom burden, implement recommended regimen as above with augmentative, intermittent supportive psychotherapy to reduce symptom burden. Patient acknowledged and verbally consented to continue treatment. The importance of adherence to the recommended treatment and interval follow-up appointments was again emphasized today: patient has poor treatment adherence per given history. Patient was today reminded to limit daily caffeine intake, hydrate appropriately, eat healthy and nutritious foods, engage sleep hygiene measures, engage appropriate exposure to sunlight, engage with hobbies in balance with life necessities, and exercise appropriate to their capacity to do so.     Billing:  Additionally, I provided 15 minutes minutes of dedicated psychotherapy to the patient as documented above.    Parts of this note are electronic transcriptions/translations of spoken language to printed text using the Dragon Dictation system.    Electronically signed by TIFFANY Serrano, 06/12/24

## 2024-07-03 NOTE — TELEPHONE ENCOUNTER
Livia Gallego APRN  [facility] Clinical [facility]    Sorry. Should be in now!

## 2024-07-03 NOTE — TELEPHONE ENCOUNTER
LAB CALLED IN.    PT PRESENT TO GET HER LABS COMPLETED THOUGH THEIR IS NO ORDER IN.    PROVIDER PLEASE ADVISE.

## 2024-07-04 LAB
QT INTERVAL: 362 MS
QTC INTERVAL: 436 MS

## 2024-07-05 DIAGNOSIS — R41.840 ATTENTION AND CONCENTRATION DEFICIT: Primary | ICD-10-CM

## 2024-07-05 RX ORDER — DEXTROAMPHETAMINE SACCHARATE, AMPHETAMINE ASPARTATE, DEXTROAMPHETAMINE SULFATE AND AMPHETAMINE SULFATE 1.25; 1.25; 1.25; 1.25 MG/1; MG/1; MG/1; MG/1
5 TABLET ORAL DAILY
Qty: 30 TABLET | Refills: 0 | Status: SHIPPED | OUTPATIENT
Start: 2024-07-05 | End: 2024-08-04

## 2024-07-05 NOTE — TELEPHONE ENCOUNTER
----- Message from Livia Gallego sent at 7/5/2024  7:53 AM EDT -----  Please place Daniel on chart. Thanks.

## 2024-07-09 NOTE — PROGRESS NOTES
"Chief Complaint  Lower Extremity Swelling    Subjective          Hope Serena Cuevas presents to [facility]  History of Present Illness    Seen in ER 6/24/24 for jaw/chest pain.  Had reassuring evaluation with no evidence of ACS.  Found to have dental abscess.  Given clindamycin and pain medicine.  Miss Cuevas subsequently completed antibiotic.  She called her dentist, but did not keep her appointment last Tuesday as she \"panicked.\"  She is feeling better now in terms of her jaw pain.  She is agreeable to calling dentist to reschedule.    She reports that after ER visit, had about a week of worsened lower extremity swelling.  She wonders if LE swelling could be due to the aspirin she received, or due to infection.  This swelling has improved to her baseline (I.e., while she has some LE edema, it is much better).  She reports that her LE edema is overall improved since starting chlorthalidone.    She also wonders what she can do that is OTC to support her bone health.    Current Outpatient Medications   Medication Instructions    albuterol sulfate  (90 Base) MCG/ACT inhaler 2 puffs, Inhalation, Every 4 Hours PRN    amLODIPine-benazepril (Lotrel) 10-40 MG per capsule 1 capsule, Oral, Daily    amphetamine-dextroamphetamine (Adderall) 5 MG tablet 5 mg, Oral, Daily    chlorthalidone (HYGROTON) 25 mg, Oral, Daily    gabapentin (NEURONTIN) 300 mg, Oral, 4 Times Daily    HYDROcodone-acetaminophen (NORCO) 5-325 MG per tablet 1 tablet, Oral, Every 6 Hours PRN    traZODone (DESYREL) 50 mg, Oral, Nightly       The following portions of the patient's history were reviewed and updated as appropriate: allergies, current medications, past family history, past medical history, past social history, past surgical history, and problem list.    Objective   Vital Signs:   /81 (BP Location: Left arm, Patient Position: Sitting, Cuff Size: Large Adult)   Pulse 87   Temp 97.9 " "°F (36.6 °C) (Temporal)   Ht 162.6 cm (64.02\")   Wt 89.6 kg (197 lb 9.6 oz)   SpO2 95%   BMI 33.90 kg/m²     BP Readings from Last 3 Encounters:   07/12/24 120/81   07/03/24 134/77   06/24/24 107/57     Wt Readings from Last 3 Encounters:   07/12/24 89.6 kg (197 lb 9.6 oz)   07/03/24 88.7 kg (195 lb 9.6 oz)   06/24/24 93.5 kg (206 lb 2.1 oz)           Physical Exam  Vitals reviewed.   Constitutional:       General: She is not in acute distress.     Appearance: Normal appearance. She is not ill-appearing, toxic-appearing or diaphoretic.   HENT:      Head: Normocephalic and atraumatic.      Right Ear: External ear normal.      Left Ear: External ear normal.   Eyes:      Conjunctiva/sclera: Conjunctivae normal.   Cardiovascular:      Rate and Rhythm: Normal rate and regular rhythm.      Pulses: Normal pulses.      Heart sounds: Normal heart sounds. No murmur heard.     No friction rub. No gallop.   Pulmonary:      Effort: Pulmonary effort is normal. No respiratory distress.      Breath sounds: Normal breath sounds. No stridor. No wheezing, rhonchi or rales.   Chest:      Chest wall: No tenderness.   Abdominal:      General: Abdomen is flat.      Palpations: Abdomen is soft. There is no mass.      Tenderness: There is no abdominal tenderness.   Musculoskeletal:      Right lower leg: Edema present.      Left lower leg: Edema present.      Comments: Trace LE edema bilaterally, non-pitting   Skin:     General: Skin is warm and dry.   Neurological:      General: No focal deficit present.      Mental Status: She is alert. Mental status is at baseline.   Psychiatric:         Behavior: Behavior normal.         Thought Content: Thought content normal.         Judgment: Judgment normal.        Result Review :   The following data was reviewed by: Thierno Li MD on 07/12/2024:  Common labs          7/31/2023    15:09 8/9/2023    12:31 6/24/2024    19:41   Common Labs   Glucose 86  78  102    BUN 10  22  15    Creatinine " 0.74  0.95  0.86    Sodium 138  139  141    Potassium 3.4  3.7  3.6    Chloride 104  102  104    Calcium 9.4  10.4  8.8    Albumin 4.1  4.8  3.9    Total Bilirubin 0.4  0.7  0.2    Alkaline Phosphatase 114  121  92    AST (SGOT) 20  28  32    ALT (SGPT) 33  35  31    WBC 10.46   10.16    Hemoglobin 11.9   11.9    Hematocrit 36.0   35.3    Platelets 354   205             Lab Results   Component Value Date    SARSANTIGEN Not Detected 09/21/2023    COVID19 Not Detected 09/21/2023    RAPFLUA Negative 09/21/2023    RAPFLUB Negative 09/21/2023    FLUAAG Not Detected 09/21/2023    FLUBAG Not Detected 09/21/2023    RAPSCRN Negative 02/17/2023    INR 1.03 07/18/2023    BILIRUBINUR Negative 07/31/2023       Procedures        Assessment and Plan    Diagnoses and all orders for this visit:    1. Dental abscess (Primary)    2. Essential hypertension    3. Swelling of both lower extremities  -     Duplex Lower Extremity Art / Grafts - Bilateral CAR; Future    4. Postmenopausal  -     DEXA Bone Density Axial      Dental abscess:  -s/p antibiotic, feeling improved  -agreeable to rescheduling with her dentist    HTN:  -BP at goal of < 130/80    LE swelling:  -will check U/S for evidence of DVTs  -overall, swelling is improved  -will continue chlorthalidone and monitor for now    Bone health:  -recommended 1,000 to 2,000 IU vitamin D daily as well as 500 mg calcium and regular intake of dairy  -DEXA ordered    There are no discontinued medications.       Follow Up   Return in about 3 months (around 10/12/2024) for Annual physical.  Patient was given instructions and counseling regarding her condition or for health maintenance advice. Please see specific information pulled into the AVS if appropriate.       Thierno Li MD  07/12/24  12:08 EDT

## 2024-07-12 ENCOUNTER — OFFICE VISIT (OUTPATIENT)
Dept: INTERNAL MEDICINE | Facility: CLINIC | Age: 59
End: 2024-07-12
Payer: COMMERCIAL

## 2024-07-12 VITALS
TEMPERATURE: 97.9 F | BODY MASS INDEX: 33.73 KG/M2 | DIASTOLIC BLOOD PRESSURE: 81 MMHG | SYSTOLIC BLOOD PRESSURE: 120 MMHG | WEIGHT: 197.6 LBS | HEART RATE: 87 BPM | HEIGHT: 64 IN | OXYGEN SATURATION: 95 %

## 2024-07-12 DIAGNOSIS — Z78.0 POSTMENOPAUSAL: ICD-10-CM

## 2024-07-12 DIAGNOSIS — M79.89 SWELLING OF BOTH LOWER EXTREMITIES: ICD-10-CM

## 2024-07-12 DIAGNOSIS — K04.7 DENTAL ABSCESS: Primary | ICD-10-CM

## 2024-07-12 DIAGNOSIS — I10 ESSENTIAL HYPERTENSION: ICD-10-CM

## 2024-07-12 PROCEDURE — 99214 OFFICE O/P EST MOD 30 MIN: CPT | Performed by: STUDENT IN AN ORGANIZED HEALTH CARE EDUCATION/TRAINING PROGRAM

## 2024-07-18 ENCOUNTER — TELEPHONE (OUTPATIENT)
Dept: INTERNAL MEDICINE | Facility: CLINIC | Age: 59
End: 2024-07-18

## 2024-07-18 NOTE — TELEPHONE ENCOUNTER
Caller: [place]O    Relationship to patient: [facility]    Best call back number: [phone]     Patient is needing: [facility] CALLED TO GET INFORMATION ON WHERE PATIENT IS SUPPOSED TO GO FOR DUPLEX LOWER EXTREMITY IMAGING AND WHICH PROVIDER SCHEDULES THIS FOR PATIENT. PLEASE CALL PATIENT TO DISCUSS.

## 2024-08-06 ENCOUNTER — OFFICE VISIT (OUTPATIENT)
Dept: PSYCHIATRY | Facility: CLINIC | Age: 59
End: 2024-08-06
Payer: COMMERCIAL

## 2024-08-06 VITALS
DIASTOLIC BLOOD PRESSURE: 77 MMHG | HEIGHT: 64 IN | BODY MASS INDEX: 32.44 KG/M2 | HEART RATE: 73 BPM | SYSTOLIC BLOOD PRESSURE: 129 MMHG | WEIGHT: 190 LBS

## 2024-08-06 DIAGNOSIS — F43.10 POST TRAUMATIC STRESS DISORDER (PTSD): ICD-10-CM

## 2024-08-06 DIAGNOSIS — F41.0 PANIC ATTACKS: ICD-10-CM

## 2024-08-06 DIAGNOSIS — R41.840 ATTENTION AND CONCENTRATION DEFICIT: Primary | ICD-10-CM

## 2024-08-06 DIAGNOSIS — F90.9 ADULT ADHD: ICD-10-CM

## 2024-08-06 DIAGNOSIS — F32.A MOOD DISORDER OF DEPRESSED TYPE: ICD-10-CM

## 2024-08-06 DIAGNOSIS — F40.10 SOCIAL ANXIETY DISORDER: ICD-10-CM

## 2024-08-06 DIAGNOSIS — F90.9 ADULT ADHD: Primary | ICD-10-CM

## 2024-08-06 DIAGNOSIS — F32.0 CURRENT MILD EPISODE OF MAJOR DEPRESSIVE DISORDER WITHOUT PRIOR EPISODE: ICD-10-CM

## 2024-08-06 PROCEDURE — 99214 OFFICE O/P EST MOD 30 MIN: CPT

## 2024-08-06 RX ORDER — DEXTROAMPHETAMINE SACCHARATE, AMPHETAMINE ASPARTATE, DEXTROAMPHETAMINE SULFATE AND AMPHETAMINE SULFATE 1.25; 1.25; 1.25; 1.25 MG/1; MG/1; MG/1; MG/1
5 TABLET ORAL DAILY
Qty: 30 TABLET | Refills: 0 | Status: SHIPPED | OUTPATIENT
Start: 2024-08-06 | End: 2025-08-06

## 2024-08-06 NOTE — PROGRESS NOTES
"[facility]  Follow-up Visit    Chief Complaint: \"I am having the worst anxiety\"      History of Present Illness: Vita Cuevas is a vivacious 58 y.o. female who presents today for initial evaluation regarding anxiety, and panic attacks.  Ms. Cuevas presents unaccompanied in no anxious distress and engages with me appropriately. Psychotropic regimen with which patient presents is described as nothing.      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  She believes that her anxiety, brain fog, and worsening anxiety is related to menopause. Psychiatric screening is negative for pathognomonic history of: TBI, mila, psychosis, violence, and suicidality.   Recently, patient complains of worsening panic that is affecting her work.  She stated that she struggled over at her oldest son's during [place].Recurrent episodes of intense, unprovoked anxiety with chest pain, feelings of doom, dizziness, avoidance 2/2 fear of recurrence of panic.  Patient screened for mila, there is a familial history of bipolar disorder(son), and bipolar disorder can sometime manifest in menopausal women, regalado, patient did not meet DSM-V criteria for bipolar affective disorder.  Her history led me to believe that there is a obsessive-compulsive component that she is dealing with.  She did not meet criteria for obsessive compulsive disorder per the DSM-V.  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 Patient stated multiple times that she does not like to take any medicine, so I was mindful of that, but wanted to educate patient on all options like benzodiazepines, buspirone, and hydroxyzine. After patient education, patient agreed to trial clonidine 0.1 mg po BID prn for panic attacks. R/B/E reviewed. I will begin clonidine for panic attacks; I have advised this agent has propensity to diminish blood pressure and may result in dizziness, sedation, xerostomia. Patient instructed to be mindful of orthostatic hypotension. Encouraged patient to obtain home BP cuff.      I have counseled the patient with regard to diagnoses and the recommended treatment regimen as documented below: I will assume prescriptive responsibility for clonidine 0.1 mg po twice daily prn, anxiety. Patient acknowledges the diagnoses per my rendered interpretation. Patient demonstrates awareness/understanding of viable alternatives for treatment as well as potential risks, benefits, and side effects associated with this regimen and is amenable to proceed in this fashion.        Interval History Vita is a 58 y.o. female who presents today for follow-up.Vita presents unaccompanied in no acute distress and engages with me appropriately.     Current treatment regimen includes:   - trazodone 50 mgpo q HS  Adderall 5 mg po q day  Side-effects per given history: none reported.      Today the patient feels happy. Overall doing well, Vita did report an increase of anxiety, but still would like to treat ADHD with stimulant at this time.  Offers were made for noncontrolled medication for anxiety patient declined. thought process and content are devoid of overt aberration suggestive of acute mila/psychosis. The patient denies SI/HI/AVH. There are not changes on exam today compared to most recent evaluation.    - sleep: no concerns  - appetite: moderately controlled    I have counseled the patient with regard to diagnoses and the  recommended treatment regimen as documented below. Patient acknowledges the diagnoses per my rendered interpretation. Patient demonstrates understanding of potential risks/benefits/side effects associated with this regimen and is amenable to proceed in this fashion.     Assignment: begin journaling instances of overwhelm, response thereto, ideal response to cultivate insight and begin breaking a pattern of stimulus/response.        Social History     Socioeconomic History    Marital status: Single    Number of children: 3    Years of education: 12+    Highest education level: Some college, no degree   Tobacco Use    Smoking status: Every Day     Current packs/day: 0.50     Average packs/day: 0.3 packs/day for 37.6 years (9.5 ttl pk-yrs)     Types: Cigarettes     Start date: 2024     Passive exposure: Current    Smokeless tobacco: Never    Tobacco comments:     1-2 CIGS PER DAY   Vaping Use    Vaping status: Never Used   Substance and Sexual Activity    Alcohol use: Not Currently    Drug use: Never    Sexual activity: Not Currently     Partners: Male     Birth control/protection: Abstinence, Tubal ligation, Post-menopausal, None       Tobacco use counseling/intervention: patient has been counseled with regard to risks of tobacco use and encouraged to consider quitting, with or without the use of adjunctive medication. Currently Precontemplation by transtheoretical model.     Problem List:  Patient Active Problem List   Diagnosis    Hypokalemia    Annual physical exam    Calculus of gallbladder    Acute cholecystitis    Choledocholithiasis with acute cholecystitis    S/P laparoscopic cholecystectomy     Allergy:   No Known Allergies     Discontinued Medications:  There are no discontinued medications.    Current Medications:   Current Outpatient Medications   Medication Sig Dispense Refill    albuterol sulfate  (90 Base) MCG/ACT inhaler Inhale 2 puffs Every 4 (Four) Hours As Needed for Wheezing or Shortness of  Air. 18 g 2    amLODIPine-benazepril (Lotrel) 10-40 MG per capsule Take 1 capsule by mouth Daily. 90 capsule 2    chlorthalidone (HYGROTON) 25 MG tablet TAKE 1 TABLET BY MOUTH EVERY DAY 90 tablet 2    gabapentin (NEURONTIN) 300 MG capsule Take 1 capsule by mouth 4 (Four) Times a Day. 120 capsule 2    traZODone (DESYREL) 50 MG tablet TAKE 1 TABLET BY MOUTH EVERY NIGHT 90 tablet 1    HYDROcodone-acetaminophen (NORCO) 5-325 MG per tablet Take 1 tablet by mouth Every 6 (Six) Hours As Needed for Moderate Pain. (Patient not taking: Reported on 2024) 12 tablet 0     No current facility-administered medications for this visit.     Past Medical History:  Past Medical History:   Diagnosis Date    Anxiety     Calculus of gallbladder 2023    Endometriosis     Hypertension     Kidney stone     Other insomnia     PTSD (post-traumatic stress disorder)      Past Surgical History:  Past Surgical History:   Procedure Laterality Date     SECTION      x3    CHOLECYSTECTOMY N/A 2023    Procedure: CHOLECYSTECTOMY LAPAROSCOPIC, INTRAOPERATIVE CHOLANGIOGRAM;  Surgeon: Marbella Razo MD;  Location: [facility] MAIN OR;  Service: General;  Laterality: N/A;    ERCP N/A 2023    Procedure: ENDOSCOPIC RETROGRADE CHOLANGIOPANCREATOGRAPHY WITH BALLOON SWEEP, SPHINCTEROTOMY AND STENT PLACEMENT;  Surgeon: Rush Lee MD;  Location: [facility] ENDOSCOPY;  Service: Gastroenterology;  Laterality: N/A;  BILE DUCT STONE    ERCP N/A 2023    Procedure: ENDOSCOPIC RETROGRADE CHOLANGIOPANCREATOGRAPHY WITH STENT REMOVAL, BALLOON SWEEP AND BRUSHINGS;  Surgeon: Rush Lee MD;  Location: [facility] ENDOSCOPY;  Service: Gastroenterology;  Laterality: N/A;  DISTAL BILE DUCT STRICTURE    KIDNEY STONE SURGERY      OTHER SURGICAL HISTORY      Endometriosis       MENTAL STATUS EXAM   General Appearance:  Cleanly groomed and dressed and well developed  Eye Contact:  Good eye contact  Attitude:  Cooperative, polite and  "candid  Motor Activity:  Normal gait, posture  Muscle Strength:  Normal  Speech:  Normal rate, tone, volume  Language:  Spontaneous  Mood and affect:  Normal, pleasant  Hopelessness:  Denies  Loneliness: Denies  Thought Process:  Logical  Associations/ Thought Content:  No delusions  Hallucinations:  None  Suicidal Ideations:  Not present  Homicidal Ideation:  Not present  Sensorium:  Alert  Orientation:  Person  Immediate Recall, Recent, and Remote Memory:  Intact  Attention Span/ Concentration:  Good  Fund of Knowledge:  Appropriate for age and educational level  Intellectual Functioning:  Average range  Insight:  Good  Judgement:  Good  Reliability:  Good  Impulse Control:  Good      Vital Signs:   /77   Pulse 73   Ht 162.6 cm (64.02\")   Wt 86.2 kg (190 lb)   BMI 32.60 kg/m²    Lab Results:   Lab on 07/03/2024   Component Date Value Ref Range Status    Amphet/Methamphet, Screen 07/03/2024 Negative  Negative Final    Barbiturates Screen, Urine 07/03/2024 Negative  Negative Final    Benzodiazepine Screen, Urine 07/03/2024 Negative  Negative Final    Cocaine Screen, Urine 07/03/2024 Negative  Negative Final    Opiate Screen 07/03/2024 Negative  Negative Final    THC, Screen, Urine 07/03/2024 Negative  Negative Final    Methadone Screen, Urine 07/03/2024 Negative  Negative Final    Oxycodone Screen, Urine 07/03/2024 Negative  Negative Final    Fentanyl, Urine 07/03/2024 Negative  Negative Final   Admission on 06/24/2024, Discharged on 06/24/2024   Component Date Value Ref Range Status    QT Interval 06/24/2024 362  ms Final    QTC Interval 06/24/2024 436  ms Final    HS Troponin T 06/24/2024 7  <14 ng/L Final    Glucose 06/24/2024 102 (H)  65 - 99 mg/dL Final    BUN 06/24/2024 15  6 - 20 mg/dL Final    Creatinine 06/24/2024 0.86  0.57 - 1.00 mg/dL Final    Sodium 06/24/2024 141  136 - 145 mmol/L Final    Potassium 06/24/2024 3.6  3.5 - 5.2 mmol/L Final    Chloride 06/24/2024 104  98 - 107 mmol/L Final    " CO2 06/24/2024 25.2  22.0 - 29.0 mmol/L Final    Calcium 06/24/2024 8.8  8.6 - 10.5 mg/dL Final    Total Protein 06/24/2024 6.6  6.0 - 8.5 g/dL Final    Albumin 06/24/2024 3.9  3.5 - 5.2 g/dL Final    ALT (SGPT) 06/24/2024 31  1 - 33 U/L Final    AST (SGOT) 06/24/2024 32  1 - 32 U/L Final    Alkaline Phosphatase 06/24/2024 92  39 - 117 U/L Final    Total Bilirubin 06/24/2024 0.2  0.0 - 1.2 mg/dL Final    Globulin 06/24/2024 2.7  gm/dL Final    A/G Ratio 06/24/2024 1.4  g/dL Final    BUN/Creatinine Ratio 06/24/2024 17.4  7.0 - 25.0 Final    Anion Gap 06/24/2024 11.8  5.0 - 15.0 mmol/L Final    eGFR 06/24/2024 78.9  >60.0 mL/min/1.73 Final    Lipase 06/24/2024 23  13 - 60 U/L Final    proBNP 06/24/2024 438.9  0.0 - 900.0 pg/mL Final    Magnesium 06/24/2024 1.7  1.6 - 2.6 mg/dL Final    Extra Tube 06/24/2024 Hold for add-ons.   Final    Auto resulted.    Extra Tube 06/24/2024 hold for add-on   Final    Auto resulted    Extra Tube 06/24/2024 Hold for add-ons.   Final    Auto resulted.    Extra Tube 06/24/2024 Hold for add-ons.   Final    Auto resulted    WBC 06/24/2024 10.16  3.40 - 10.80 10*3/mm3 Final    RBC 06/24/2024 3.93  3.77 - 5.28 10*6/mm3 Final    Hemoglobin 06/24/2024 11.9 (L)  12.0 - 15.9 g/dL Final    Hematocrit 06/24/2024 35.3  34.0 - 46.6 % Final    MCV 06/24/2024 89.8  79.0 - 97.0 fL Final    MCH 06/24/2024 30.3  26.6 - 33.0 pg Final    MCHC 06/24/2024 33.7  31.5 - 35.7 g/dL Final    RDW 06/24/2024 13.9  12.3 - 15.4 % Final    RDW-SD 06/24/2024 45.0  37.0 - 54.0 fl Final    MPV 06/24/2024 11.3  6.0 - 12.0 fL Final    Platelets 06/24/2024 205  140 - 450 10*3/mm3 Final    Neutrophil % 06/24/2024 57.1  42.7 - 76.0 % Final    Lymphocyte % 06/24/2024 33.1  19.6 - 45.3 % Final    Monocyte % 06/24/2024 7.7  5.0 - 12.0 % Final    Eosinophil % 06/24/2024 1.1  0.3 - 6.2 % Final    Basophil % 06/24/2024 0.7  0.0 - 1.5 % Final    Immature Grans % 06/24/2024 0.3  0.0 - 0.5 % Final    Neutrophils, Absolute 06/24/2024  5.81  1.70 - 7.00 10*3/mm3 Final    Lymphocytes, Absolute 06/24/2024 3.36 (H)  0.70 - 3.10 10*3/mm3 Final    Monocytes, Absolute 06/24/2024 0.78  0.10 - 0.90 10*3/mm3 Final    Eosinophils, Absolute 06/24/2024 0.11  0.00 - 0.40 10*3/mm3 Final    Basophils, Absolute 06/24/2024 0.07  0.00 - 0.20 10*3/mm3 Final    Immature Grans, Absolute 06/24/2024 0.03  0.00 - 0.05 10*3/mm3 Final    nRBC 06/24/2024 0.0  0.0 - 0.2 /100 WBC Final    HS Troponin T 06/24/2024 7  <14 ng/L Final    Troponin T Delta 06/24/2024 0  >=-4 - <+4 ng/L Final   Lab on 02/22/2024   Component Date Value Ref Range Status    Amphet/Methamphet, Screen 02/22/2024 Negative  Negative Final    Barbiturates Screen, Urine 02/22/2024 Negative  Negative Final    Benzodiazepine Screen, Urine 02/22/2024 Negative  Negative Final    Cocaine Screen, Urine 02/22/2024 Negative  Negative Final    Opiate Screen 02/22/2024 Negative  Negative Final    THC, Screen, Urine 02/22/2024 Negative  Negative Final    Methadone Screen, Urine 02/22/2024 Negative  Negative Final    Oxycodone Screen, Urine 02/22/2024 Negative  Negative Final    Fentanyl, Urine 02/22/2024 Negative  Negative Final       ASSESSMENT AND PLAN:    ICD-10-CM ICD-9-CM   1. Attention and concentration deficit  R41.840 799.51   2. Panic attacks  F41.0 300.01   3. Post traumatic stress disorder (PTSD)  F43.10 309.81   4. Mood disorder of depressed type  F32.A 311   5. Current mild episode of major depressive disorder without prior episode  F32.0 296.21   6. Social anxiety disorder  F40.10 300.23   7. Adult ADHD  F90.9 314.01       Vita is a 58 y.o. female who presents today for follow-up regarding patient management. We have discussed the interval history and the treatment plan below, including potential R/B/SE of the recommended regimen of which the patient demonstrates understanding. Patient is agreeable to call 911 or go to the nearest ER should she become concerned for her own safety and/or the safety of  those around her. There are are no overt indices of acute mila/psychosis on exam today. DANNIE reviewed and is as expected.    Medication regimen: Continue Adderall amphetamine/dextroamphetamine IR 5 mg p.o. a.m., continue trazodone 50 mg p.o. nightly; patient is advised not to misuse prescribed medications or to use them with any exogenous substances that aren't disclosed to this provider as they may interact with the regimen to the patient's detriment.   Risk Assessment: protracted risk is moderate, imminent risk is moderate do note that this is subject to change with the [employer] of new stressors, treatment non-adherence, use of substances, and/or new medical ails.   Monitoring: reviewed labs/imaging as populated above; ordered  Therapy: Declined  Follow-up: 1 to 3 months  Communications: N/A    TREATMENT PLAN/GOALS: challenge patterns of living conducive to symptom burden, implement recommended regimen as above with augmentative, intermittent supportive psychotherapy to reduce symptom burden. Patient acknowledged and verbally consented to continue treatment. The importance of adherence to the recommended treatment and interval follow-up appointments was again emphasized today: patient has good treatment adherence per given history. Patient was today reminded to limit daily caffeine intake, hydrate appropriately, eat healthy and nutritious foods, engage sleep hygiene measures, engage appropriate exposure to sunlight, engage with hobbies in balance with life necessities, and exercise appropriate to their capacity to do so.     Parts of this note are electronic transcriptions/translations of spoken language to printed text using the Dragon Dictation system.    Electronically signed by TIFFANY Serrano, 08/06/24

## 2024-08-09 DIAGNOSIS — G89.29 CHRONIC RIGHT-SIDED LOW BACK PAIN WITH RIGHT-SIDED SCIATICA: ICD-10-CM

## 2024-08-09 DIAGNOSIS — M54.41 CHRONIC RIGHT-SIDED LOW BACK PAIN WITH RIGHT-SIDED SCIATICA: ICD-10-CM

## 2024-08-12 RX ORDER — GABAPENTIN 300 MG/1
300 CAPSULE ORAL 4 TIMES DAILY
Qty: 120 CAPSULE | Refills: 2 | Status: SHIPPED | OUTPATIENT
Start: 2024-08-12

## 2024-08-20 DIAGNOSIS — M79.89 SWELLING OF BOTH LOWER EXTREMITIES: Primary | ICD-10-CM

## 2024-09-09 DIAGNOSIS — G47.00 INSOMNIA, UNSPECIFIED TYPE: ICD-10-CM

## 2024-09-09 DIAGNOSIS — F90.9 ADULT ADHD: ICD-10-CM

## 2024-09-09 RX ORDER — DEXTROAMPHETAMINE SACCHARATE, AMPHETAMINE ASPARTATE, DEXTROAMPHETAMINE SULFATE AND AMPHETAMINE SULFATE 1.25; 1.25; 1.25; 1.25 MG/1; MG/1; MG/1; MG/1
5 TABLET ORAL DAILY
Qty: 30 TABLET | Refills: 0 | Status: SHIPPED | OUTPATIENT
Start: 2024-09-09 | End: 2025-09-09

## 2024-09-09 NOTE — TELEPHONE ENCOUNTER
CONTROLLED MEDICATION REFILL REQUEST    STATE REGULATION APPT EVERY 3 MONTHS     UDS(URINE DRUG SCREEN) EVERY 6 MONTHS OR UP TO PROVIDER PREFERENCE     NEW NARC CONSENT EVERY YEAR      MEDICATION:   amphetamine-dextroamphetamine (Adderall) 5 MG tablet (08/06/2024)      NEXT OFFICE VISIT: Appointment with Livia Gallego APRN (09/26/2024)     LAST OFFICE VISIT: Office Visit with Livia Gallego APRN (08/06/2024)     NARC CONSENT: CONTROLLED SUBSTANCE AGREEMENT - SCAN - CONTROLLED SUSBTANCE AGREEMENT, BEHAVIORAL HEALTH, 07/03/2024 (07/03/2024)     URINE DRUG SCREEN(STANDING ORDER): Urine Drug Screen - Urine, Clean Catch (07/03/2024 12:54)     PROVIDER PLEASE ADVISE

## 2024-09-16 ENCOUNTER — TELEPHONE (OUTPATIENT)
Dept: INTERNAL MEDICINE | Facility: CLINIC | Age: 59
End: 2024-09-16
Payer: COMMERCIAL

## 2024-09-29 DIAGNOSIS — G47.00 INSOMNIA, UNSPECIFIED TYPE: ICD-10-CM

## 2024-09-30 RX ORDER — TRAZODONE HYDROCHLORIDE 50 MG/1
50 TABLET, FILM COATED ORAL NIGHTLY
Qty: 90 TABLET | Refills: 1 | Status: SHIPPED | OUTPATIENT
Start: 2024-09-30

## 2024-10-01 PROBLEM — R10.9 ABDOMINAL PAIN IN FEMALE: Status: ACTIVE | Noted: 2024-10-01

## 2024-10-02 ENCOUNTER — TELEPHONE (OUTPATIENT)
Dept: INTERNAL MEDICINE | Facility: CLINIC | Age: 59
End: 2024-10-02
Payer: COMMERCIAL

## 2024-10-15 NOTE — PROGRESS NOTES
Chief Complaint  Annual Exam, Rectal Pain (Pt states she has been having a sharp pain in her rectum at random times, not while using the toilet.), and family hstory (Pt recently found our her biological father has leukemia, and would like to know if that is something that can be genetic or passed down)    Subjective          Hope Serena Cuevas presents to [facility]  History of Present Illness    Having rectal pain at random times.  Denies hematochezia.  Denies jet black stools.  Does have loose stools at times.  Colonoscopy referral previously placed and active, but has been procrastinating on calling to schedule.  Miss Cuevas is agreeable to calling to schedule.    DEXA scheduled in near term.    Smoking 7 cigarettes a day, and has as a goal quitting.  Has nicotine patches, but hasn't been using them.  Started smoking age 19, and for most of her smoking history smoked 1/2 PPD.    Off adderral as it made her most anxious.  She denies SI.    Gabapentin is helping to alleviate her lower back pain.    PHQ-9 Depression Screening  Little interest or pleasure in doing things?     Feeling down, depressed, or hopeless?     PHQ-2 Total Score     Trouble falling or staying asleep, or sleeping too much?     Feeling tired or having little energy?     Poor appetite or overeating?     Feeling bad about yourself - or that you are a failure or have let yourself or your family down?     Trouble concentrating on things, such as reading the newspaper or watching television?     Moving or speaking so slowly that other people could have noticed? Or the opposite - being so fidgety or restless that you have been moving around a lot more than usual?     Thoughts that you would be better off dead, or of hurting yourself in some way?     PHQ-9 Total Score     If you checked off any problems, how difficult have these problems made it for you to do your work, take care of things at home, or  "get along with other people?           Current Outpatient Medications   Medication Instructions    albuterol sulfate  (90 Base) MCG/ACT inhaler 2 puffs, Inhalation, Every 4 Hours PRN    amLODIPine-benazepril (LOTREL) 10-20 MG per capsule     amphetamine-dextroamphetamine (Adderall) 5 MG tablet 5 mg, Oral, Daily    chlorthalidone (HYGROTON) 25 mg, Oral, Daily    dicyclomine (BENTYL) 20 mg, Oral, Every 6 Hours PRN    gabapentin (NEURONTIN) 300 mg, Oral, 4 Times Daily    traZODone (DESYREL) 50 mg, Oral, Nightly       The following portions of the patient's history were reviewed and updated as appropriate: allergies, current medications, past family history, past medical history, past social history, past surgical history, and problem list.    Objective   Vital Signs:   /81   Pulse 77   Temp 98.4 °F (36.9 °C)   Ht 162.6 cm (64\")   Wt 84.8 kg (187 lb)   SpO2 98%   BMI 32.10 kg/m²     BP Readings from Last 3 Encounters:   10/17/24 115/81   10/01/24 135/90   08/06/24 129/77     Wt Readings from Last 3 Encounters:   10/17/24 84.8 kg (187 lb)   10/01/24 86.2 kg (190 lb)   08/06/24 86.2 kg (190 lb)     BMI is >= 30 and <35. (Class 1 Obesity). The following options were offered after discussion;: weight loss educational material (shared in after visit summary), exercise counseling/recommendations, and information on healthy weight added to patient's after visit summary      Physical Exam  Vitals reviewed.   Constitutional:       General: She is not in acute distress.     Appearance: Normal appearance. She is not ill-appearing, toxic-appearing or diaphoretic.   HENT:      Head: Normocephalic and atraumatic.      Right Ear: External ear normal.      Left Ear: External ear normal.   Eyes:      Conjunctiva/sclera: Conjunctivae normal.   Cardiovascular:      Rate and Rhythm: Normal rate and regular rhythm.      Pulses: Normal pulses.      Heart sounds: Normal heart sounds. No murmur heard.     No friction rub. No " gallop.   Pulmonary:      Effort: Pulmonary effort is normal. No respiratory distress.      Breath sounds: Normal breath sounds. No stridor. No wheezing, rhonchi or rales.   Chest:      Chest wall: No tenderness.   Abdominal:      General: Abdomen is flat.      Palpations: Abdomen is soft. There is no mass.      Tenderness: There is no abdominal tenderness.   Musculoskeletal:      Right lower leg: No edema.      Left lower leg: No edema.   Skin:     General: Skin is warm and dry.   Neurological:      General: No focal deficit present.      Mental Status: She is alert. Mental status is at baseline.   Psychiatric:         Behavior: Behavior normal.         Thought Content: Thought content normal.         Judgment: Judgment normal.          Result Review :   The following data was reviewed by: Thierno Li MD on 10/17/2024:  Common labs          6/24/2024    19:41   Common Labs   Glucose 102    BUN 15    Creatinine 0.86    Sodium 141    Potassium 3.6    Chloride 104    Calcium 8.8    Albumin 3.9    Total Bilirubin 0.2    Alkaline Phosphatase 92    AST (SGOT) 32    ALT (SGPT) 31    WBC 10.16    Hemoglobin 11.9    Hematocrit 35.3    Platelets 205             Lab Results   Component Value Date    SARSANTIGEN Not Detected 09/21/2023    COVID19 Not Detected 09/21/2023    RAPFLUA Negative 09/21/2023    RAPFLUB Negative 09/21/2023    FLUAAG Not Detected 09/21/2023    FLUBAG Not Detected 09/21/2023    RAPSCRN Negative 02/17/2023    INR 1.03 07/18/2023    BILIRUBINUR Negative 10/01/2024            Assessment and Plan    Diagnoses and all orders for this visit:    1. Annual physical exam (Primary)  -     TSH Rfx On Abnormal To Free T4  -     Lipid Panel  -     Comprehensive Metabolic Panel  -     CBC & Differential    2. Insomnia, unspecified type    3. Essential hypertension    4. Gastroesophageal reflux disease without esophagitis    5. Cigarette nicotine dependence without complication    6. Adult ADHD    7. Class 1  obesity due to excess calories with serious comorbidity and body mass index (BMI) of 32.0 to 32.9 in adult    8. Chronic right-sided low back pain with right-sided sciatica    9. Encounter for screening mammogram for malignant neoplasm of breast  -     Mammo Screening Digital Tomosynthesis Bilateral With CAD; Future    10. Cervical cancer screening  -     Ambulatory Referral to Obstetrics / Gynecology      Tobacco cessation:  -counseled today on the health risks of tobacco use  -strongly counseled today on the importance of tobacco cessation  -counseling today on medical and non-medical strategies for smoking cessation  -non-medical strategies discussed today include the following: identifying your motivations for smoking cessation, setting a quit date, identifying your usual patterns and triggers for smoking, coming up with strategies ahead of time for managing your usual smoking times and usual triggers (such as making it as inconvenient as possible to complete the act, coming up with short substitute activities to engage in in lieu of tobacco use)  -encouraged to start using her nicotine patches    HTN:  -stable  -will continue lotrel and chlorthalidone    Insomnia:  -on trazodone    LBP:  -well controlled on gabapentin    Health Maintenance:  -nutritional counseling on the components of a heart healthy diet  -exercise counseling, recommending at least 2.5 hours of moderate exercise weekly      There are no discontinued medications.       Follow Up   Return in about 3 months (around 1/17/2025) for LBP.  Patient was given instructions and counseling regarding her condition or for health maintenance advice. Please see specific information pulled into the AVS if appropriate.       Thierno Li MD  10/17/24  11:53 EDT

## 2024-10-17 ENCOUNTER — OFFICE VISIT (OUTPATIENT)
Dept: INTERNAL MEDICINE | Facility: CLINIC | Age: 59
End: 2024-10-17
Payer: COMMERCIAL

## 2024-10-17 VITALS
WEIGHT: 187 LBS | SYSTOLIC BLOOD PRESSURE: 115 MMHG | OXYGEN SATURATION: 98 % | HEIGHT: 64 IN | DIASTOLIC BLOOD PRESSURE: 81 MMHG | BODY MASS INDEX: 31.92 KG/M2 | TEMPERATURE: 98.4 F | HEART RATE: 77 BPM

## 2024-10-17 DIAGNOSIS — Z12.4 CERVICAL CANCER SCREENING: ICD-10-CM

## 2024-10-17 DIAGNOSIS — E66.09 CLASS 1 OBESITY DUE TO EXCESS CALORIES WITH SERIOUS COMORBIDITY AND BODY MASS INDEX (BMI) OF 32.0 TO 32.9 IN ADULT: ICD-10-CM

## 2024-10-17 DIAGNOSIS — K21.9 GASTROESOPHAGEAL REFLUX DISEASE WITHOUT ESOPHAGITIS: ICD-10-CM

## 2024-10-17 DIAGNOSIS — E66.811 CLASS 1 OBESITY DUE TO EXCESS CALORIES WITH SERIOUS COMORBIDITY AND BODY MASS INDEX (BMI) OF 32.0 TO 32.9 IN ADULT: ICD-10-CM

## 2024-10-17 DIAGNOSIS — Z00.00 ANNUAL PHYSICAL EXAM: Primary | ICD-10-CM

## 2024-10-17 DIAGNOSIS — Z12.31 ENCOUNTER FOR SCREENING MAMMOGRAM FOR MALIGNANT NEOPLASM OF BREAST: ICD-10-CM

## 2024-10-17 DIAGNOSIS — G47.00 INSOMNIA, UNSPECIFIED TYPE: ICD-10-CM

## 2024-10-17 DIAGNOSIS — F90.9 ADULT ADHD: ICD-10-CM

## 2024-10-17 DIAGNOSIS — G89.29 CHRONIC RIGHT-SIDED LOW BACK PAIN WITH RIGHT-SIDED SCIATICA: ICD-10-CM

## 2024-10-17 DIAGNOSIS — M54.41 CHRONIC RIGHT-SIDED LOW BACK PAIN WITH RIGHT-SIDED SCIATICA: ICD-10-CM

## 2024-10-17 DIAGNOSIS — F17.210 CIGARETTE NICOTINE DEPENDENCE WITHOUT COMPLICATION: ICD-10-CM

## 2024-10-17 DIAGNOSIS — I10 ESSENTIAL HYPERTENSION: ICD-10-CM

## 2024-10-17 LAB
ALBUMIN SERPL-MCNC: 4.1 G/DL (ref 3.5–5.2)
ALBUMIN/GLOB SERPL: 1.5 G/DL
ALP SERPL-CCNC: 84 U/L (ref 39–117)
ALT SERPL W P-5'-P-CCNC: 21 U/L (ref 1–33)
ANION GAP SERPL CALCULATED.3IONS-SCNC: 8.6 MMOL/L (ref 5–15)
AST SERPL-CCNC: 32 U/L (ref 1–32)
BASOPHILS # BLD AUTO: 0.07 10*3/MM3 (ref 0–0.2)
BASOPHILS NFR BLD AUTO: 1.3 % (ref 0–1.5)
BILIRUB SERPL-MCNC: 0.3 MG/DL (ref 0–1.2)
BUN SERPL-MCNC: 13 MG/DL (ref 6–20)
BUN/CREAT SERPL: 15.9 (ref 7–25)
CALCIUM SPEC-SCNC: 9.5 MG/DL (ref 8.6–10.5)
CHLORIDE SERPL-SCNC: 104 MMOL/L (ref 98–107)
CHOLEST SERPL-MCNC: 200 MG/DL (ref 0–200)
CO2 SERPL-SCNC: 26.4 MMOL/L (ref 22–29)
CREAT SERPL-MCNC: 0.82 MG/DL (ref 0.57–1)
DEPRECATED RDW RBC AUTO: 44.6 FL (ref 37–54)
EGFRCR SERPLBLD CKD-EPI 2021: 83 ML/MIN/1.73
EOSINOPHIL # BLD AUTO: 0.08 10*3/MM3 (ref 0–0.4)
EOSINOPHIL NFR BLD AUTO: 1.5 % (ref 0.3–6.2)
ERYTHROCYTE [DISTWIDTH] IN BLOOD BY AUTOMATED COUNT: 13 % (ref 12.3–15.4)
GLOBULIN UR ELPH-MCNC: 2.7 GM/DL
GLUCOSE SERPL-MCNC: 75 MG/DL (ref 65–99)
HCT VFR BLD AUTO: 40.1 % (ref 34–46.6)
HDLC SERPL-MCNC: 47 MG/DL (ref 40–60)
HGB BLD-MCNC: 13 G/DL (ref 12–15.9)
IMM GRANULOCYTES # BLD AUTO: 0 10*3/MM3 (ref 0–0.05)
IMM GRANULOCYTES NFR BLD AUTO: 0 % (ref 0–0.5)
LDLC SERPL CALC-MCNC: 137 MG/DL (ref 0–100)
LDLC/HDLC SERPL: 2.89 {RATIO}
LYMPHOCYTES # BLD AUTO: 2.39 10*3/MM3 (ref 0.7–3.1)
LYMPHOCYTES NFR BLD AUTO: 44.9 % (ref 19.6–45.3)
MCH RBC QN AUTO: 30.7 PG (ref 26.6–33)
MCHC RBC AUTO-ENTMCNC: 32.4 G/DL (ref 31.5–35.7)
MCV RBC AUTO: 94.8 FL (ref 79–97)
MONOCYTES # BLD AUTO: 0.36 10*3/MM3 (ref 0.1–0.9)
MONOCYTES NFR BLD AUTO: 6.8 % (ref 5–12)
NEUTROPHILS NFR BLD AUTO: 2.42 10*3/MM3 (ref 1.7–7)
NEUTROPHILS NFR BLD AUTO: 45.5 % (ref 42.7–76)
NRBC BLD AUTO-RTO: 0 /100 WBC (ref 0–0.2)
PLATELET # BLD AUTO: 225 10*3/MM3 (ref 140–450)
PMV BLD AUTO: 11.6 FL (ref 6–12)
POTASSIUM SERPL-SCNC: 3.9 MMOL/L (ref 3.5–5.2)
PROT SERPL-MCNC: 6.8 G/DL (ref 6–8.5)
RBC # BLD AUTO: 4.23 10*6/MM3 (ref 3.77–5.28)
SODIUM SERPL-SCNC: 139 MMOL/L (ref 136–145)
TRIGL SERPL-MCNC: 87 MG/DL (ref 0–150)
TSH SERPL DL<=0.05 MIU/L-ACNC: 0.99 UIU/ML (ref 0.27–4.2)
VLDLC SERPL-MCNC: 16 MG/DL (ref 5–40)
WBC NRBC COR # BLD AUTO: 5.32 10*3/MM3 (ref 3.4–10.8)

## 2024-10-17 PROCEDURE — 80050 GENERAL HEALTH PANEL: CPT | Performed by: STUDENT IN AN ORGANIZED HEALTH CARE EDUCATION/TRAINING PROGRAM

## 2024-10-17 PROCEDURE — 99214 OFFICE O/P EST MOD 30 MIN: CPT | Performed by: STUDENT IN AN ORGANIZED HEALTH CARE EDUCATION/TRAINING PROGRAM

## 2024-10-17 PROCEDURE — 80061 LIPID PANEL: CPT | Performed by: STUDENT IN AN ORGANIZED HEALTH CARE EDUCATION/TRAINING PROGRAM

## 2024-10-17 PROCEDURE — 99396 PREV VISIT EST AGE 40-64: CPT | Performed by: STUDENT IN AN ORGANIZED HEALTH CARE EDUCATION/TRAINING PROGRAM

## 2024-11-06 DIAGNOSIS — M54.41 CHRONIC RIGHT-SIDED LOW BACK PAIN WITH RIGHT-SIDED SCIATICA: ICD-10-CM

## 2024-11-06 DIAGNOSIS — G89.29 CHRONIC RIGHT-SIDED LOW BACK PAIN WITH RIGHT-SIDED SCIATICA: ICD-10-CM

## 2024-11-06 NOTE — TELEPHONE ENCOUNTER
Refill Request for Controlled Substance    Date of Request: 11/6/2024  Medication Requested: Gabapentin   Last UDS: 11/03/2023  Last Consent: 07/03/2024  Last OV: 10/17/2024  Next OV: 01/17/2025

## 2024-11-08 RX ORDER — GABAPENTIN 300 MG/1
300 CAPSULE ORAL 4 TIMES DAILY
Qty: 120 CAPSULE | Refills: 2 | Status: SHIPPED | OUTPATIENT
Start: 2024-11-08

## 2025-01-16 NOTE — PROGRESS NOTES
Chief Complaint  Hypertension, Shortness of Breath (For about a month ), Chills, Sore Throat, and Obesity (Patient states that she does not understand how she is not losing weight, she states she walks 10 miles a day at work and is not eating bad, she states she would like to have her thyroid checked. )    Subjective          Hope Serena Cuevas presents to [facility]  History of Present Illness    Elevated BP noted today.  Hasn't taken meds today.  Discussed importance of taking meds consistently    Reports gabapentin helping to alleviate low back pain.    Having increased shortness of breath for about a month.  Associated symptoms include nonproductive cough, sore throat, chills.  Had diarrhea once or twice.  No vomiting, no fever.  Using albuterol twice a day during this period.  Has not been smoking or vaping in the interim.    Also concerned about her weight gain.  Requests thyroid check.  Has changed her diet.  Now eating more salads (with ranch) and more avocados.    PHQ-9 Depression Screening  Little interest or pleasure in doing things? Not at all   Feeling down, depressed, or hopeless? Several days   PHQ-2 Total Score 1   Trouble falling or staying asleep, or sleeping too much?     Feeling tired or having little energy?     Poor appetite or overeating?     Feeling bad about yourself - or that you are a failure or have let yourself or your family down?     Trouble concentrating on things, such as reading the newspaper or watching television?     Moving or speaking so slowly that other people could have noticed? Or the opposite - being so fidgety or restless that you have been moving around a lot more than usual?     Thoughts that you would be better off dead, or of hurting yourself in some way?     PHQ-9 Total Score     If you checked off any problems, how difficult have these problems made it for you to do your work, take care of things at home, or get  "along with other people? Not difficult at all         Current Outpatient Medications   Medication Instructions    albuterol sulfate  (90 Base) MCG/ACT inhaler 2 puffs, Inhalation, Every 4 Hours PRN    amLODIPine-benazepril (LOTREL) 10-20 MG per capsule     amphetamine-dextroamphetamine (Adderall) 5 MG tablet 5 mg, Oral, Daily    chlorthalidone (HYGROTON) 25 mg, Oral, Daily    dicyclomine (BENTYL) 20 mg, Oral, Every 6 Hours PRN    gabapentin (NEURONTIN) 300 mg, Oral, 4 Times Daily    traZODone (DESYREL) 50 mg, Oral, Nightly       The following portions of the patient's history were reviewed and updated as appropriate: allergies, current medications, past family history, past medical history, past social history, past surgical history, and problem list.    Objective   Vital Signs:   /84   Pulse 95   Temp 98.7 °F (37.1 °C) (Temporal)   Ht 162.6 cm (64\")   Wt 88.9 kg (196 lb)   SpO2 96%   BMI 33.64 kg/m²     BP Readings from Last 3 Encounters:   01/17/25 144/84   10/17/24 115/81   10/01/24 135/90     Wt Readings from Last 3 Encounters:   01/17/25 88.9 kg (196 lb)   10/17/24 84.8 kg (187 lb)   10/01/24 86.2 kg (190 lb)           Physical Exam  Vitals reviewed.   Constitutional:       General: She is not in acute distress.     Appearance: Normal appearance. She is not ill-appearing, toxic-appearing or diaphoretic.   HENT:      Head: Normocephalic and atraumatic.      Right Ear: External ear normal.      Left Ear: External ear normal.   Eyes:      Conjunctiva/sclera: Conjunctivae normal.   Cardiovascular:      Rate and Rhythm: Normal rate and regular rhythm.      Pulses: Normal pulses.      Heart sounds: Normal heart sounds. No murmur heard.     No friction rub. No gallop.   Pulmonary:      Effort: Pulmonary effort is normal. No respiratory distress.      Breath sounds: Normal breath sounds. No stridor. No wheezing, rhonchi or rales.   Chest:      Chest wall: No tenderness.   Abdominal:      General: " Abdomen is flat.      Palpations: Abdomen is soft. There is no mass.      Tenderness: There is no abdominal tenderness.   Musculoskeletal:      Right lower leg: No edema.      Left lower leg: No edema.   Skin:     General: Skin is warm and dry.   Neurological:      General: No focal deficit present.      Mental Status: She is alert. Mental status is at baseline.   Psychiatric:         Behavior: Behavior normal.         Thought Content: Thought content normal.         Judgment: Judgment normal.          Result Review :   The following data was reviewed by: Thierno Li MD on 01/17/2025:  Common labs          6/24/2024    19:41 10/17/2024    11:36   Common Labs   Glucose 102  75    BUN 15  13    Creatinine 0.86  0.82    Sodium 141  139    Potassium 3.6  3.9    Chloride 104  104    Calcium 8.8  9.5    Albumin 3.9  4.1    Total Bilirubin 0.2  0.3    Alkaline Phosphatase 92  84    AST (SGOT) 32  32    ALT (SGPT) 31  21    WBC 10.16  5.32    Hemoglobin 11.9  13.0    Hematocrit 35.3  40.1    Platelets 205  225    Total Cholesterol  200    Triglycerides  87    HDL Cholesterol  47    LDL Cholesterol   137             Lab Results   Component Value Date    SARSANTIGEN Not Detected 01/17/2025    COVID19 Not Detected 09/21/2023    RAPFLUA Negative 09/21/2023    RAPFLUB Negative 09/21/2023    FLUAAG Not Detected 01/17/2025    FLUBAG Not Detected 01/17/2025    RAPSCRN Negative 01/17/2025    INR 1.03 07/18/2023    BILIRUBINUR Negative 10/01/2024            Assessment and Plan    Diagnoses and all orders for this visit:    1. Chronic right-sided low back pain with right-sided sciatica (Primary)    2. Essential hypertension  -     Comprehensive Metabolic Panel    3. Medication management  -     POC Medline 12 Panel Urine Drug Screen    4. Sore throat  -     Beta Strep Culture, Throat - Swab, Throat  -     COVID-19,CEPHEID/IVETTE,COR/IVETTE/PAD/VALERIE/LAG/TREVON IN-HOUSE,NP SWAB IN TRANSPORT MEDIA 1 HR TAT, RT-PCR - Swab, Nasopharynx  -      POC Rapid Strep A  -     POCT SARS-CoV-2 + Flu Antigen CHASE    5. Persistent cough  -     albuterol sulfate  (90 Base) MCG/ACT inhaler; Inhale 2 puffs Every 4 (Four) Hours As Needed for Wheezing or Shortness of Air.  Dispense: 18 g; Refill: 2    6. Dyspnea, unspecified type  -     Complete PFT - Pre & Post Bronchodilator; Future    7. Thyroid disorder screening  -     TSH    8. Class 1 obesity due to excess calories without serious comorbidity with body mass index (BMI) of 33.0 to 33.9 in adult      HTN:  -BP above goal of < 130/80  -discussed imporance of consistently taking medicine    LBP:  -well controlled with gabapentin    Cough/Dyspnea:  -will obtain PFTs    Obesity:  -nutritional counseling today  -recommended avoiding high calorie salad dressings such as ranch.  Recommended low fat cheese in lieu of regular cheese.  Recommended avoid liquid calories such as sodas and sweet tea.  Recommended reducing use of avocado as it is quite high calorie      Medications Discontinued During This Encounter   Medication Reason    albuterol sulfate  (90 Base) MCG/ACT inhaler Reorder          Follow Up   Return in about 3 months (around 4/17/2025) for LBP.  Patient was given instructions and counseling regarding her condition or for health maintenance advice. Please see specific information pulled into the AVS if appropriate.       Thierno Li MD  01/17/25  16:08 EST

## 2025-01-17 ENCOUNTER — OFFICE VISIT (OUTPATIENT)
Dept: INTERNAL MEDICINE | Facility: CLINIC | Age: 60
End: 2025-01-17
Payer: COMMERCIAL

## 2025-01-17 VITALS
HEIGHT: 64 IN | WEIGHT: 196 LBS | HEART RATE: 95 BPM | OXYGEN SATURATION: 96 % | DIASTOLIC BLOOD PRESSURE: 84 MMHG | BODY MASS INDEX: 33.46 KG/M2 | SYSTOLIC BLOOD PRESSURE: 144 MMHG | TEMPERATURE: 98.7 F

## 2025-01-17 DIAGNOSIS — I10 ESSENTIAL HYPERTENSION: ICD-10-CM

## 2025-01-17 DIAGNOSIS — Z79.899 MEDICATION MANAGEMENT: ICD-10-CM

## 2025-01-17 DIAGNOSIS — R05.3 PERSISTENT COUGH: ICD-10-CM

## 2025-01-17 DIAGNOSIS — E66.811 CLASS 1 OBESITY DUE TO EXCESS CALORIES WITHOUT SERIOUS COMORBIDITY WITH BODY MASS INDEX (BMI) OF 33.0 TO 33.9 IN ADULT: ICD-10-CM

## 2025-01-17 DIAGNOSIS — Z13.29 THYROID DISORDER SCREENING: ICD-10-CM

## 2025-01-17 DIAGNOSIS — E66.09 CLASS 1 OBESITY DUE TO EXCESS CALORIES WITHOUT SERIOUS COMORBIDITY WITH BODY MASS INDEX (BMI) OF 33.0 TO 33.9 IN ADULT: ICD-10-CM

## 2025-01-17 DIAGNOSIS — J02.9 SORE THROAT: ICD-10-CM

## 2025-01-17 DIAGNOSIS — M54.41 CHRONIC RIGHT-SIDED LOW BACK PAIN WITH RIGHT-SIDED SCIATICA: Primary | ICD-10-CM

## 2025-01-17 DIAGNOSIS — G89.29 CHRONIC RIGHT-SIDED LOW BACK PAIN WITH RIGHT-SIDED SCIATICA: Primary | ICD-10-CM

## 2025-01-17 DIAGNOSIS — R06.00 DYSPNEA, UNSPECIFIED TYPE: ICD-10-CM

## 2025-01-17 LAB
ALBUMIN SERPL-MCNC: 4.3 G/DL (ref 3.5–5.2)
ALBUMIN/GLOB SERPL: 1.5 G/DL
ALP SERPL-CCNC: 104 U/L (ref 39–117)
ALT SERPL W P-5'-P-CCNC: 29 U/L (ref 1–33)
AMPHET+METHAMPHET UR QL: NEGATIVE
AMPHETAMINE INTERNAL CONTROL: NORMAL
AMPHETAMINES UR QL: NEGATIVE
ANION GAP SERPL CALCULATED.3IONS-SCNC: 10 MMOL/L (ref 5–15)
AST SERPL-CCNC: 23 U/L (ref 1–32)
BARBITURATE INTERNAL CONTROL: NORMAL
BARBITURATES UR QL SCN: NEGATIVE
BENZODIAZ UR QL SCN: NEGATIVE
BENZODIAZEPINE INTERNAL CONTROL: NORMAL
BILIRUB SERPL-MCNC: 0.3 MG/DL (ref 0–1.2)
BUN SERPL-MCNC: 14 MG/DL (ref 6–20)
BUN/CREAT SERPL: 16.7 (ref 7–25)
BUPRENORPHINE INTERNAL CONTROL: NORMAL
BUPRENORPHINE SERPL-MCNC: NEGATIVE NG/ML
CALCIUM SPEC-SCNC: 9.4 MG/DL (ref 8.6–10.5)
CANNABINOIDS SERPL QL: NEGATIVE
CHLORIDE SERPL-SCNC: 105 MMOL/L (ref 98–107)
CO2 SERPL-SCNC: 26 MMOL/L (ref 22–29)
COCAINE INTERNAL CONTROL: NORMAL
COCAINE UR QL: NEGATIVE
CREAT SERPL-MCNC: 0.84 MG/DL (ref 0.57–1)
EGFRCR SERPLBLD CKD-EPI 2021: 80.7 ML/MIN/1.73
EXPIRATION DATE: NORMAL
FLUAV AG UPPER RESP QL IA.RAPID: NOT DETECTED
FLUBV AG UPPER RESP QL IA.RAPID: NOT DETECTED
GLOBULIN UR ELPH-MCNC: 2.8 GM/DL
GLUCOSE SERPL-MCNC: 97 MG/DL (ref 65–99)
INTERNAL CONTROL: NORMAL
INTERNAL CONTROL: NORMAL
Lab: NORMAL
MDMA (ECSTASY) INTERNAL CONTROL: NORMAL
MDMA UR QL SCN: NEGATIVE
METHADONE INTERNAL CONTROL: NORMAL
METHADONE UR QL SCN: NEGATIVE
METHAMPHETAMINE INTERNAL CONTROL: NORMAL
MORPHINE INTERNAL CONTROL: NORMAL
MORPHINE/OPIATES SCREEN, URINE: NEGATIVE
OXYCODONE INTERNAL CONTROL: NORMAL
OXYCODONE UR QL SCN: NEGATIVE
PCP UR QL SCN: NEGATIVE
PHENCYCLIDINE INTERNAL CONTROL: NORMAL
POTASSIUM SERPL-SCNC: 3.7 MMOL/L (ref 3.5–5.2)
PROT SERPL-MCNC: 7.1 G/DL (ref 6–8.5)
S PYO AG THROAT QL: NEGATIVE
SARS-COV-2 AG UPPER RESP QL IA.RAPID: NOT DETECTED
SARS-COV-2 RNA RESP QL NAA+PROBE: NOT DETECTED
SODIUM SERPL-SCNC: 141 MMOL/L (ref 136–145)
THC INTERNAL CONTROL: NORMAL
TSH SERPL DL<=0.05 MIU/L-ACNC: 1.1 UIU/ML (ref 0.27–4.2)

## 2025-01-17 PROCEDURE — 87635 SARS-COV-2 COVID-19 AMP PRB: CPT | Performed by: STUDENT IN AN ORGANIZED HEALTH CARE EDUCATION/TRAINING PROGRAM

## 2025-01-17 PROCEDURE — 87081 CULTURE SCREEN ONLY: CPT | Performed by: STUDENT IN AN ORGANIZED HEALTH CARE EDUCATION/TRAINING PROGRAM

## 2025-01-17 PROCEDURE — 80053 COMPREHEN METABOLIC PANEL: CPT | Performed by: STUDENT IN AN ORGANIZED HEALTH CARE EDUCATION/TRAINING PROGRAM

## 2025-01-17 PROCEDURE — 99214 OFFICE O/P EST MOD 30 MIN: CPT | Performed by: STUDENT IN AN ORGANIZED HEALTH CARE EDUCATION/TRAINING PROGRAM

## 2025-01-17 PROCEDURE — 80305 DRUG TEST PRSMV DIR OPT OBS: CPT | Performed by: STUDENT IN AN ORGANIZED HEALTH CARE EDUCATION/TRAINING PROGRAM

## 2025-01-17 PROCEDURE — 87880 STREP A ASSAY W/OPTIC: CPT | Performed by: STUDENT IN AN ORGANIZED HEALTH CARE EDUCATION/TRAINING PROGRAM

## 2025-01-17 PROCEDURE — 87428 SARSCOV & INF VIR A&B AG IA: CPT | Performed by: STUDENT IN AN ORGANIZED HEALTH CARE EDUCATION/TRAINING PROGRAM

## 2025-01-17 PROCEDURE — 84443 ASSAY THYROID STIM HORMONE: CPT | Performed by: STUDENT IN AN ORGANIZED HEALTH CARE EDUCATION/TRAINING PROGRAM

## 2025-01-17 RX ORDER — ALBUTEROL SULFATE 90 UG/1
2 INHALANT RESPIRATORY (INHALATION) EVERY 4 HOURS PRN
Qty: 18 G | Refills: 2 | Status: SHIPPED | OUTPATIENT
Start: 2025-01-17

## 2025-01-17 RX ORDER — AMLODIPINE AND BENAZEPRIL HYDROCHLORIDE 10; 40 MG/1; MG/1
1 CAPSULE ORAL DAILY
Qty: 30 CAPSULE | Refills: 11 | Status: CANCELLED | OUTPATIENT
Start: 2025-01-17 | End: 2026-01-17

## 2025-01-19 LAB — BACTERIA SPEC AEROBE CULT: NORMAL

## 2025-01-24 ENCOUNTER — OFFICE VISIT (OUTPATIENT)
Dept: FAMILY MEDICINE CLINIC | Facility: CLINIC | Age: 60
End: 2025-01-24
Payer: COMMERCIAL

## 2025-01-24 VITALS
BODY MASS INDEX: 32.1 KG/M2 | HEART RATE: 86 BPM | SYSTOLIC BLOOD PRESSURE: 146 MMHG | DIASTOLIC BLOOD PRESSURE: 86 MMHG | HEIGHT: 64 IN | WEIGHT: 188 LBS | OXYGEN SATURATION: 98 % | TEMPERATURE: 97.9 F

## 2025-01-24 DIAGNOSIS — M54.41 CHRONIC RIGHT-SIDED LOW BACK PAIN WITH RIGHT-SIDED SCIATICA: ICD-10-CM

## 2025-01-24 DIAGNOSIS — F17.200 SMOKER: ICD-10-CM

## 2025-01-24 DIAGNOSIS — R05.3 PERSISTENT COUGH: ICD-10-CM

## 2025-01-24 DIAGNOSIS — Z12.11 SCREENING FOR COLON CANCER: Primary | ICD-10-CM

## 2025-01-24 DIAGNOSIS — G89.29 CHRONIC RIGHT-SIDED LOW BACK PAIN WITH RIGHT-SIDED SCIATICA: ICD-10-CM

## 2025-01-24 DIAGNOSIS — G47.00 INSOMNIA, UNSPECIFIED TYPE: ICD-10-CM

## 2025-01-24 DIAGNOSIS — I10 ESSENTIAL HYPERTENSION: ICD-10-CM

## 2025-01-24 PROCEDURE — 99214 OFFICE O/P EST MOD 30 MIN: CPT | Performed by: STUDENT IN AN ORGANIZED HEALTH CARE EDUCATION/TRAINING PROGRAM

## 2025-01-24 RX ORDER — AMLODIPINE AND BENAZEPRIL HYDROCHLORIDE 10; 20 MG/1; MG/1
1 CAPSULE ORAL DAILY
Qty: 90 CAPSULE | Refills: 2 | Status: SHIPPED | OUTPATIENT
Start: 2025-01-24

## 2025-01-24 RX ORDER — TRAZODONE HYDROCHLORIDE 50 MG/1
50 TABLET, FILM COATED ORAL NIGHTLY
Qty: 90 TABLET | Refills: 1 | Status: SHIPPED | OUTPATIENT
Start: 2025-01-24

## 2025-01-24 RX ORDER — ATORVASTATIN CALCIUM 20 MG/1
20 TABLET, FILM COATED ORAL DAILY
Qty: 90 TABLET | Refills: 2 | Status: SHIPPED | OUTPATIENT
Start: 2025-01-24

## 2025-01-24 RX ORDER — CHLORTHALIDONE 25 MG/1
25 TABLET ORAL DAILY
Qty: 90 TABLET | Refills: 2 | Status: SHIPPED | OUTPATIENT
Start: 2025-01-24

## 2025-01-24 RX ORDER — GABAPENTIN 300 MG/1
300 CAPSULE ORAL 4 TIMES DAILY
Qty: 120 CAPSULE | Refills: 2 | Status: SHIPPED | OUTPATIENT
Start: 2025-01-24

## 2025-01-24 NOTE — PROGRESS NOTES
"Chief Complaint  Annual Exam, Establish Care (New pt ), and Dental Pain    Subjective      Hope Serena Cuevas is a 59 y.o. female who presents to [facility]     New patient:  Patient comes to establish care.     HLD  The 10-year ASCVD risk score (Ck JENSEN, et al., 2019) is: 11.5%    Values used to calculate the score:      Age: 59 years      Sex: Female      Is Non- : No      Diabetic: No      Tobacco smoker: Yes      Systolic Blood Pressure: 146 mmHg      Is BP treated: Yes      HDL Cholesterol: 47 mg/dL      Total Cholesterol: 200 mg/dL  Started on statins     Patient with dental pain. Instructed to see the dentist ASAP. Pt refers understanding.     Back pain and neuropathy on Gabapentin.    HTN is borderline. Will refill meds. Pt didn't take meds today.     Objective   Vital Signs:   Vitals:    01/24/25 1007   BP: 146/86   Pulse: 86   Temp: 97.9 °F (36.6 °C)   TempSrc: Temporal   SpO2: 98%   Weight: 85.3 kg (188 lb)   Height: 162.6 cm (64.02\")     Body mass index is 32.25 kg/m².    Wt Readings from Last 3 Encounters:   01/24/25 85.3 kg (188 lb)   01/17/25 88.9 kg (196 lb)   10/17/24 84.8 kg (187 lb)     BP Readings from Last 3 Encounters:   01/24/25 146/86   01/17/25 144/84   10/17/24 115/81       Health Maintenance   Topic Date Due    COLORECTAL CANCER SCREENING  Never done    MAMMOGRAM  05/02/2016    LUNG CANCER SCREENING  Never done    PAP SMEAR  Never done    INFLUENZA VACCINE  03/31/2025 (Originally 7/1/2024)    COVID-19 Vaccine (1 - 2024-25 season) 04/08/2025 (Originally 9/1/2024)    ANNUAL PHYSICAL  10/17/2025    BMI FOLLOWUP  01/17/2026    TDAP/TD VACCINES (2 - Td or Tdap) 12/30/2032    HEPATITIS C SCREENING  Completed    Pneumococcal Vaccine 0-64  Completed    ZOSTER VACCINE  Completed       Physical Exam  Vitals reviewed.   HENT:      Head: Normocephalic.      Mouth/Throat:      Mouth: Mucous membranes are moist.   Eyes:      Pupils: Pupils " are equal, round, and reactive to light.   Cardiovascular:      Rate and Rhythm: Normal rate.   Abdominal:      General: Abdomen is flat.   Musculoskeletal:         General: Normal range of motion.      Cervical back: Normal range of motion.   Skin:     General: Skin is warm.      Capillary Refill: Capillary refill takes less than 2 seconds.   Neurological:      Mental Status: She is alert.          Assessment & Plan  Screening for colon cancer    Orders:    Ambulatory Referral For Screening Colonoscopy    Smoker    Orders:     CT Chest Low Dose Cancer Screening WO; Future    Essential hypertension  Hypertension is stable and controlled  Continue current treatment regimen.  Blood pressure will be reassessed in 6 months.    Orders:    chlorthalidone (HYGROTON) 25 MG tablet; Take 1 tablet by mouth Daily.    Persistent cough         Chronic right-sided low back pain with right-sided sciatica    Orders:    gabapentin (NEURONTIN) 300 MG capsule; Take 1 capsule by mouth 4 (Four) Times a Day.    Insomnia, unspecified type    Orders:    traZODone (DESYREL) 50 MG tablet; Take 1 tablet by mouth Every Night.               I spent 25 minutes caring for Hope on this date of service. This time includes time spent by me in the following activities:preparing for the visit, reviewing tests, obtaining and/or reviewing a separately obtained history, performing a medically appropriate examination and/or evaluation, counseling and educating the patient/family/caregiver, ordering medications, tests, or procedures, referring and communicating with other health care professionals, documenting information in the medical record, independently interpreting results and communicating that information with the patient/family/caregiver, care coordination.    FOLLOW UP  No follow-ups on file.  Patient was given instructions and counseling regarding her condition or for health maintenance advice. Please see specific information pulled into the AVS  if appropriate.       Brennon Busby MD  01/24/25  10:53 EST    CURRENT & DISCONTINUED MEDICATIONS  Current Outpatient Medications   Medication Instructions    albuterol sulfate  (90 Base) MCG/ACT inhaler 2 puffs, Inhalation, Every 4 Hours PRN    amLODIPine-benazepril (LOTREL) 10-20 MG per capsule 1 capsule, Oral, Daily    atorvastatin (LIPITOR) 20 mg, Oral, Daily    chlorthalidone (HYGROTON) 25 mg, Oral, Daily    gabapentin (NEURONTIN) 300 mg, Oral, 4 Times Daily    traZODone (DESYREL) 50 mg, Oral, Nightly       Medications Discontinued During This Encounter   Medication Reason    chlorthalidone (HYGROTON) 25 MG tablet Reorder    traZODone (DESYREL) 50 MG tablet Reorder    amLODIPine-benazepril (LOTREL) 10-20 MG per capsule Reorder    gabapentin (NEURONTIN) 300 MG capsule Reorder    amphetamine-dextroamphetamine (Adderall) 5 MG tablet *Therapy completed    dicyclomine (BENTYL) 20 MG tablet *Therapy completed

## 2025-01-28 ENCOUNTER — PATIENT ROUNDING (BHMG ONLY) (OUTPATIENT)
Dept: FAMILY MEDICINE CLINIC | Facility: CLINIC | Age: 60
End: 2025-01-28
Payer: COMMERCIAL

## 2025-01-28 NOTE — PROGRESS NOTES
A Shaka MESSAGE HAS BEEN SENT TO THE PATIENT FOR PATIENT ROUNDING WITH [facility]

## 2025-02-13 ENCOUNTER — TELEPHONE (OUTPATIENT)
Dept: GASTROENTEROLOGY | Facility: CLINIC | Age: 60
End: 2025-02-13
Payer: COMMERCIAL

## 2025-02-13 NOTE — TELEPHONE ENCOUNTER
Patient was scheduled for a Nurse/MA Visit today (2.13.25). Called pt. No answer. Left message for pt to call back.

## 2025-04-28 DIAGNOSIS — G89.29 CHRONIC RIGHT-SIDED LOW BACK PAIN WITH RIGHT-SIDED SCIATICA: ICD-10-CM

## 2025-04-28 DIAGNOSIS — M54.41 CHRONIC RIGHT-SIDED LOW BACK PAIN WITH RIGHT-SIDED SCIATICA: ICD-10-CM

## 2025-04-28 RX ORDER — GABAPENTIN 300 MG/1
300 CAPSULE ORAL 4 TIMES DAILY
Qty: 120 CAPSULE | Refills: 2 | Status: SHIPPED | OUTPATIENT
Start: 2025-04-28

## 2025-07-25 DIAGNOSIS — M54.41 CHRONIC RIGHT-SIDED LOW BACK PAIN WITH RIGHT-SIDED SCIATICA: ICD-10-CM

## 2025-07-25 DIAGNOSIS — G89.29 CHRONIC RIGHT-SIDED LOW BACK PAIN WITH RIGHT-SIDED SCIATICA: ICD-10-CM

## 2025-07-25 RX ORDER — GABAPENTIN 300 MG/1
300 CAPSULE ORAL 4 TIMES DAILY
Qty: 120 CAPSULE | Refills: 2 | Status: SHIPPED | OUTPATIENT
Start: 2025-07-25

## (undated) DEVICE — SOL IRR NACL 0.9PCT 3000ML

## (undated) DEVICE — STERILE POLYISOPRENE POWDER-FREE SURGICAL GLOVES WITH EMOLLIENT COATING: Brand: PROTEXIS

## (undated) DEVICE — STRIP,CLOSURE,WOUND,MEDI-STRIP,1/2X4: Brand: MEDLINE

## (undated) DEVICE — WIREGUIDED CYTOLOGY BRUSH: Brand: RX CYTOLOGY BRUSH

## (undated) DEVICE — GOWN,REINFORCE,POLY,SIRUS,BREATH SLV,XLG: Brand: MEDLINE

## (undated) DEVICE — GLV SURG SENSICARE PI ORTHO SZ8 LF STRL

## (undated) DEVICE — SUT MERSILENE POLYSTR UR6 BR 0 75CM GRN

## (undated) DEVICE — SLV SCD KN/LEN ADJ EXPRSS BLENDED MD 1P/U

## (undated) DEVICE — NON-WOVEN ADHESIVE WOUND DRESSING: Brand: PRIMAPORE ADHESIVE DRESSING 10*8CM

## (undated) DEVICE — GW JAG STR .035IN 450CM

## (undated) DEVICE — Device: Brand: DEFENDO AIR/WATER/SUCTION AND BIOPSY VALVE

## (undated) DEVICE — SPHINCTEROTOME: Brand: DREAMTOME™ RX 44

## (undated) DEVICE — CONN JET HYDRA H20 AUXILIARY DISP

## (undated) DEVICE — SOL IRR NACL 0.9PCT BT 1000ML

## (undated) DEVICE — BLCK/BITE BLOX WO/DENTL/RIM W/STRAP 54F

## (undated) DEVICE — GENERAL LAPAROSCOPY-LF: Brand: MEDLINE INDUSTRIES, INC.

## (undated) DEVICE — RETRIEVAL BALLOON CATHETER: Brand: EXTRACTOR™ PRO RX

## (undated) DEVICE — ROTATING HEMOSTATIC VALVE: Brand: ROTATING HEMOSTATIC VALVE

## (undated) DEVICE — ELECTRD BLD EDGE COAT 3IN

## (undated) DEVICE — ANTIBACTERIAL UNDYED BRAIDED (POLYGLACTIN 910), SYNTHETIC ABSORBABLE SUTURE: Brand: COATED VICRYL

## (undated) DEVICE — ENDOPATH PNEUMONEEDLE INSUFFLATION NEEDLES WITH LUER LOCK CONNECTORS 120MM: Brand: ENDOPATH

## (undated) DEVICE — DEV LK WIREGUIDE FUSN OLYMP SCP

## (undated) DEVICE — SPHINCT CANNULATOME2 2L DOME/TP .035IN 6F 2.8MM 200CM

## (undated) DEVICE — LINER SURG CANSTR SXN S/RIGD 1500CC

## (undated) DEVICE — SOLIDIFIER LIQLOC PLS 1500CC BT

## (undated) DEVICE — 2, DISPOSABLE SUCTION/IRRIGATOR WITHOUT DISPOSABLE TIP: Brand: STRYKEFLOW

## (undated) DEVICE — ENDOPATH XCEL WITH OPTIVIEW TECHNOLOGY BLADELESS TROCARS WITH STABILITY SLEEVES: Brand: ENDOPATH XCEL OPTIVIEW

## (undated) DEVICE — LAPAROVUE VISIBILITY SYSTEM LAPAROSCOPIC SOLUTIONS: Brand: LAPAROVUE

## (undated) DEVICE — Device

## (undated) DEVICE — TISSUE RETRIEVAL SYSTEM: Brand: INZII RETRIEVAL SYSTEM

## (undated) DEVICE — NON-WOVEN ADHESIVE WOUND DRESSING: Brand: PRIMAPORE ADHESIVE WOUND DRSG 7.2*5CM

## (undated) DEVICE — LAPAROSCOPIC SCISSORS: Brand: EPIX LAPAROSCOPIC SCISSORS

## (undated) DEVICE — SNAR POLYP SENSATION STDOVL 27 240 BX40

## (undated) DEVICE — SINGLE USE DISTAL COVER MAJ-2315: Brand: SINGLE USE DISTAL COVER

## (undated) DEVICE — ADHS LIQ MASTISOL 2/3ML

## (undated) DEVICE — GOWN,REINFRCE,POLY,SIRUS,BREATH SLV,XXLG: Brand: MEDLINE

## (undated) DEVICE — RX DELIVERY SYSTEM: Brand: NAVIFLEX™ RX DELIVERY SYSTEM

## (undated) DEVICE — TROCAR: Brand: KII® SLEEVE

## (undated) DEVICE — PENCL E/S SMOKEEVAC W/TELESCP CANN